# Patient Record
Sex: FEMALE | Race: WHITE | NOT HISPANIC OR LATINO | Employment: FULL TIME | URBAN - METROPOLITAN AREA
[De-identification: names, ages, dates, MRNs, and addresses within clinical notes are randomized per-mention and may not be internally consistent; named-entity substitution may affect disease eponyms.]

---

## 2017-02-01 ENCOUNTER — HOSPITAL ENCOUNTER (OUTPATIENT)
Dept: RADIOLOGY | Facility: MEDICAL CENTER | Age: 42
Discharge: HOME | End: 2017-02-01
Payer: COMMERCIAL

## 2017-02-01 DIAGNOSIS — Z12.31 ENCOUNTER FOR SCREENING MAMMOGRAM FOR MALIGNANT NEOPLASM OF BREAST: ICD-10-CM

## 2017-02-01 PROCEDURE — G0202 SCR MAMMO BI INCL CAD: HCPCS

## 2017-09-13 ENCOUNTER — ALLSCRIPTS OFFICE VISIT (OUTPATIENT)
Dept: OTHER | Facility: OTHER | Age: 42
End: 2017-09-13

## 2017-09-13 LAB
BILIRUB UR QL STRIP: NEGATIVE
CLARITY UR: NORMAL
COLOR UR: YELLOW
GLUCOSE (HISTORICAL): NEGATIVE
HGB UR QL STRIP.AUTO: NEGATIVE
KETONES UR STRIP-MCNC: NEGATIVE MG/DL
LEUKOCYTE ESTERASE UR QL STRIP: NEGATIVE
NITRITE UR QL STRIP: NEGATIVE
PH UR STRIP.AUTO: NEGATIVE [PH]
PROT UR STRIP-MCNC: NEGATIVE MG/DL
SP GR UR STRIP.AUTO: NEGATIVE
UROBILINOGEN UR QL STRIP.AUTO: NEGATIVE

## 2017-09-13 PROCEDURE — G0145 SCR C/V CYTO,THINLAYER,RESCR: HCPCS | Performed by: OBSTETRICS & GYNECOLOGY

## 2017-09-15 ENCOUNTER — LAB REQUISITION (OUTPATIENT)
Dept: LAB | Facility: HOSPITAL | Age: 42
End: 2017-09-15
Payer: COMMERCIAL

## 2017-09-15 DIAGNOSIS — Z12.72 ENCOUNTER FOR SCREENING FOR MALIGNANT NEOPLASM OF VAGINA: ICD-10-CM

## 2017-09-20 LAB
LAB AP GYN PRIMARY INTERPRETATION: NORMAL
LAB AP LMP: NORMAL
Lab: NORMAL

## 2017-10-26 NOTE — PROGRESS NOTES
Assessment  1  Encounter for routine gynecological examination (V72 31) (Z01 419)  2  Pap smear, as part of routine gynecological examination (V76 2) (Z01 419)  3  Visit for screening mammogram (V76 12) (Z12 31)    Plan  Encounter for routine gynecological examination    · Follow-up visit in 1 year Evaluation and Treatment  Follow-up  Status: Hold For -  Scheduling  Requested for: 13Sep2017  Ordered; For: Encounter for routine gynecological examination;  Ordered By: Rosa Rodriguez  Performed:   Due: [id number]  Gynecologic Services Contraceptive Management    · Renew: Sprintec 29 0 25-35 MG-MCG Oral Tablet; Take 1 tablet daily  Rx By: Janene Nguyen; Dispense: 0 Days ; #:84 Tablet; Refill: 3;For: Gynecologic   Services Contraceptive Management; ANGELO = N; Verified Transmission to [employer]; Last Updated By: [employer]; 9/13/2017 10:41:13 AM  Pap smear, as part of routine gynecological examination    · (1) THIN PREP PAP WITH IMAGING; Status: In Progress - Specimen/Data  Collected,Retrospective By Protocol Authorization;   Done: [id number]  Perform:[facility] In Office Collection; Order   Comments:cervical,endocervical; Due:13Oth0159; Last Updated Nhi Bi;   9/13/2017 9:58:32 AM;Ordered; For:Pap smear, as part   of routine gynecological examination; Ordered By:Surinder Ramos;  PAP Maturation index required? : No  LMP: : [id number]  Reflex HPV? : Not Requested  Visit for screening mammogram    · * MAMMO SCREENING BILATERAL W CAD; Status:Hold For - Scheduling,Exact  Date,Retrospective By Protocol Authorization; Requested for:Feb 2018; Perform:[facility]; BNR:[id number]; Last Updated Nhi Bi; 9/13/2017   10:00:30 AM;Ordered; For:Visit for screening mammogram; Ordered By:Jasmeet Ramos Doing; Discussion/Summary  healthy adult female Currently, she eats a healthy diet   cervical cancer screening is current Pap test was done today Breast cancer screening: monthly self breast exam was advised, mammogram is current and mammogram has been ordered  Advice and education were given regarding nutrition, aerobic exercise, calcium supplements, vitamin D supplements and sunscreen use  Normal GYN Exam  Stressed  ordered  SMear DOne    Consult for a vasectomy for her  given to [facility]    Refills for 1 year for the BCP  GYN Exam in 1 year  if any problems develop during the interim      for URO consult given    CHecked UA It was NEGATIVE1    The patient has the current Goals: [address]  The patent has the current Barriers: None  Patient is able to Self-Care  PATIENT EDUCATION RECORD She was given the following educational materials:  Ideas for a Healthy Life Style  The treatment plan was reviewed with the patient/guardian  The patient/guardian understands and agrees with the treatment plan     Self Referrals: No       1 Amended By: Dwayne Le; Sep 13 2017 12:55 PM EST    Chief Complaint  ANNUAL Rory Goff no problem or concerns      History of Present Illness  HPI: Mercedes Romeo is a 38 y/o white female who presents to the office for an annual visits and complains of occasional urinary urgency  She notes this has been going on for a few months and was recently treated for a bladder infection  She states she drinks a large volume of water each day but states the urgency occurs randomly throughout the week  She denies any frequency, burning, dysuria or hematuria  She denies any abnormal vaginal bleeding and states her cycles are regular interval, length and quantity  She is currently taking OCP with no complaints or complications  She denies any mood changes, weight change or HA  Her bowel habits have been normal and she denies any changes  She denies CP, SOB, or thyroid problems  regularly performs SBE and has not noticed any masses, pain or nipple discharge  she feels well and denies any other changes  GYN HM, Adult Female [facility]:  The patient is being seen for a gynecology evaluation  The last health maintenance visit was 1 year(s) ago  General Health: The patient's health since the last visit is described as good  She has regular dental visits  -- She denies vision problems  -- She denies hearing loss  Immunizations status: up to date  Lifestyle:  She consumes a diverse and healthy diet  -- She does not have any weight concerns  -- She exercises regularly  -- She does not use tobacco -- She denies drug use  Reproductive health:  she reports no menstrual problems  -- she uses contraception  -- she is sexually active  Screening: cancer screening reviewed and current  metabolic screening reviewed and current  risk screening reviewed and current  Review of Systems  feelings of urinary urgency, but-- no pelvic pain,-- no vaginal pain,-- no vaginal discharge,-- no vaginal itching,-- no nonmenstrual bleeding,-- no dysmenorrhea,-- periods are regular,-- regular length of periods,-- no dysuria,-- no bladder pain,-- no hematuria,-- no burning sensation during urination,-- no change in urinary frequency,-- no flank pain-- and-- no urinary hesitancy  Constitutional: No fever, no chills, feels well, no tiredness, no recent weight gain or loss  ENT: no ear ache, no loss of hearing, no nosebleeds or nasal discharge, no sore throat or hoarseness  Cardiovascular: no complaints of slow or fast heart rate, no chest pain, no palpitations, no leg claudication or lower extremity edema  Respiratory: no complaints of shortness of breath, no wheezing, no dyspnea on exertion, no orthopnea or PND  Breasts: no complaints of breast pain, breast lump or nipple discharge  Gastrointestinal: no complaints of abdominal pain, no constipation, no nausea or diarrhea, no vomiting, no bloody stools  Genitourinary: as noted in HPI  Musculoskeletal: no complaints of arthralgia, no myalgia, no joint swelling or stiffness, no limb pain or swelling     Integumentary: no complaints of skin rash or lesion, no itching or dry skin, no skin wounds  Neurological: no complaints of headache, no confusion, no numbness or tingling, no dizziness or fainting  Active Problems  1  Chest pain (786 50) (R07 9)  2  Encounter for Pap smear (V76 9) (Z12 9)  3  Encounter for routine gynecological examination (V72 31) (Z01 419)  4  Encounter for routine gynecological examination with Papanicolaou smear of cervix   (V72 31,V76 2) (Z01 419)  5  Gynecologic Services Contraceptive Management (V25 9)  6  Pap smear, as part of routine gynecological examination (V76 2) (Z01 419)  7  Vaginal odor (625 8) (N89 8)  8  Visit for screening mammogram (V76 12) (Z12 31)    Past Medical History   · History of Asthma (493 90) (J45 909)   · History of Diabetes Mellitus (250 00)   · History of hypertension (V12 59) (Z86 79)   · 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)   · History of Reported A Previous Heart Murmur   · History of Reported Pap Smear   · History of Summary Of Previous Pregnancies  2  (Total No )   · History of Summary Of Previous Pregnancies Para 2  (Deliveries)    Surgical History   · History of  Section   · Gynecologic Services Contraceptive Management (V25 9)   · History of High Gastric Bypass    Family History  Father    · Family history of Diabetes Mellitus (V18 0)  Paternal Grandfather    · Family history of Diabetes Mellitus (V18 0)  Family History    · Family history of Breast Cancer (V16 3)   · Family history of Cervical Cancer   · Family history of Osteoporosis (V17 81)   · Family history of Ovarian Cancer (V16 41)   · Family history of Uterine Cancer (V16 49)    Social History   · Being A Social Drinker   · Denied: History of Home Environment Domestic Violence   · Marital History - Currently    · Never A Smoker    Current Meds  1  Clobetasol Propionate 0 05 % External Foam;   Therapy: [id number] to Recorded  2   Enbrel SureClick 50 MG/ML SOLN;   Therapy: [id number] to Recorded  3  Multi-Vitamin TABS; Therapy: (Recorded:12May2014) to Recorded  4  Sprintec 28 0 25-35 MG-MCG Oral Tablet; Take 1 tablet daily; Therapy: 05WBT6168 to (Last Rx:01Aug2017)  Requested for: 01Aug2017 Ordered  5  Vitamin B12 TABS; Therapy: (Recorded:12May2014) to Recorded    Allergies  1  No Known Drug Allergies    Vitals   Recorded: 26OLZ3047 50:90FI   Systolic 604   Diastolic 58   Height 5 ft    Weight 198 lb    BMI Calculated 38 67   BSA Calculated 1 86   LMP [id number]     Physical Exam    Constitutional   General appearance: No acute distress, well appearing and well nourished  Neck   Neck: Normal, supple, trachea midline, no masses  Thyroid: Normal, no thyromegaly  Pulmonary   Respiratory effort: No increased work of breathing or signs of respiratory distress  Auscultation of lungs: Clear to auscultation  Cardiovascular   Auscultation of heart: Normal rate and rhythm, normal S1 and S2, no murmurs  Peripheral vascular exam: Normal pulses Throughout  Genitourinary   External genitalia: Normal and no lesions appreciated  Vagina: Normal, no lesions or dryness appreciated  Urethra: Normal     Urethral meatus: Normal     Bladder: Normal, soft, non-tender and no prolapse or masses appreciated  Cervix: Normal, no palpable masses  Uterus: Normal, non-tender, not enlarged, and no palpable masses  Adnexa/parametria: Normal, non-tender and no fullness or masses appreciated  Anus, perineum, and rectum: Normal sphincter tone, no masses, and no prolapse  Chest   Breasts: Normal and no dimpling or skin changes noted  Abdomen   Abdomen: Normal, non-tender, and no organomegaly noted  Liver and spleen: No hepatomegaly or splenomegaly  Examination for hernias: No hernias appreciated  Stool sample for occult blood: Negative  Lymphatic   Palpation of lymph nodes in neck, axillae, groin and/or other locations: No lymphadenopathy or masses noted      Skin   Skin and subcutaneous tissue: Normal skin turgor and no rashes      Palpation of skin and subcutaneous tissue: Normal     Psychiatric   Orientation to person, place, and time: Normal     Mood and affect: Normal        Provider Comments  Provider Comments:   Children doing well        Signatures   Electronically signed by : DEB Carrington ; Sep 13 2017 12:55PM EST                       (Author)

## 2018-01-14 VITALS
DIASTOLIC BLOOD PRESSURE: 58 MMHG | SYSTOLIC BLOOD PRESSURE: 102 MMHG | BODY MASS INDEX: 38.87 KG/M2 | HEIGHT: 60 IN | WEIGHT: 198 LBS

## 2018-02-01 DIAGNOSIS — Z12.31 ENCOUNTER FOR SCREENING MAMMOGRAM FOR MALIGNANT NEOPLASM OF BREAST: ICD-10-CM

## 2018-08-24 ENCOUNTER — OFFICE VISIT (OUTPATIENT)
Dept: OBGYN CLINIC | Facility: CLINIC | Age: 43
End: 2018-08-24
Payer: COMMERCIAL

## 2018-08-24 VITALS
WEIGHT: 208 LBS | BODY MASS INDEX: 40.84 KG/M2 | HEIGHT: 60 IN | DIASTOLIC BLOOD PRESSURE: 60 MMHG | SYSTOLIC BLOOD PRESSURE: 110 MMHG

## 2018-08-24 DIAGNOSIS — R10.2 PELVIC PAIN IN FEMALE: Primary | ICD-10-CM

## 2018-08-24 DIAGNOSIS — Z01.419 ENCOUNTER FOR GYNECOLOGICAL EXAMINATION: ICD-10-CM

## 2018-08-24 PROCEDURE — 99214 OFFICE O/P EST MOD 30 MIN: CPT | Performed by: OBSTETRICS & GYNECOLOGY

## 2018-08-24 RX ORDER — MULTIVITAMIN
TABLET ORAL
COMMUNITY

## 2018-08-24 RX ORDER — NORGESTIMATE AND ETHINYL ESTRADIOL 0.25-0.035
1 KIT ORAL DAILY
COMMUNITY
Start: 2013-12-16 | End: 2018-09-03 | Stop reason: SDUPTHER

## 2018-08-24 RX ORDER — UBIDECARENONE 75 MG
CAPSULE ORAL
COMMUNITY
Start: 2012-07-27

## 2018-08-24 NOTE — PROGRESS NOTES
Assessment/Plan:    Assessment:  Pelvic pain    Plan:  Pelvic examination performed today was essentially unremarkable  No masses or adnexal masses were appreciated    In light of the findings today, we will order a pelvic ultrasound to assess pelvic structures    Symptoms were possibly consistent with a ruptured ovarian cyst and this possibility will be evaluated with the ultrasound    Patient will be seen in 4-6 weeks for her annual examination as well    All questions were answered for her during today's visit    Patient will call should any problems issues or concerns arise during the interim    Total time spent during today's visit was 25 min of which greater than 50% of the time was face to face time in counseling    No problem-specific Assessment & Plan notes found for this encounter  Diagnoses and all orders for this visit:    Pelvic pain in female    Encounter for gynecological examination    Other orders  -     etanercept (ENBREL) 50 mg/mL SOSY; Inject under the skin  -     cyanocobalamin (VITAMIN B-12) 100 mcg tablet;   -     Multiple Vitamin (MULTI-VITAMIN DAILY) TABS; Take by mouth  -     norgestimate-ethinyl estradiol (SPRINTEC 28) 0 25-35 MG-MCG per tablet; Take 1 tablet by mouth daily        Subjective:      Patient ID: Beena Tay is a 40 y o  female      Patient is a 36-year-old female who presents today complaining of pelvic pain    Pain had started within the past week during intercourse and had localized to her left pelvic region    It persisted after relations were completed and has persisted for several days    It is not as intense as it was initially    She denies any change in bowel or bladder habits    She also denies any significant other pelvic or abdominal pain as well    She reports a normal period in denies any erratic bleeding    She currently has no new medical problems to report either            The following portions of the patient's history were reviewed and updated as appropriate: allergies, current medications, past family history, past medical history, past social history, past surgical history and problem list     Review of Systems   Constitutional: Negative  HENT: Negative  Eyes: Negative  Respiratory: Negative  Cardiovascular: Negative  Gastrointestinal: Negative  Endocrine: Negative  Genitourinary: Negative  Musculoskeletal: Negative  Skin: Negative  Neurological: Negative  Psychiatric/Behavioral: Negative  Objective:      /60 (BP Location: Left arm, Patient Position: Sitting, Cuff Size: Standard)   Ht 5' (1 524 m)   Wt 94 3 kg (208 lb)   LMP 08/10/2018 (Exact Date)   BMI 40 62 kg/m²          Physical Exam   Constitutional: She is oriented to person, place, and time  She appears well-developed and well-nourished  HENT:   Head: Normocephalic  Eyes: Pupils are equal, round, and reactive to light  Neck: Normal range of motion  Pulmonary/Chest: Effort normal    Abdominal: Soft  Genitourinary: Vagina normal and uterus normal  No vaginal discharge found  Genitourinary Comments: Pelvic examination was performed today    Uterus was normal in size anteverted in position and freely mobile    No adnexal pathology was appreciated and was free of any masses or tenderness    No other abnormalities noted       Musculoskeletal: Normal range of motion  Neurological: She is alert and oriented to person, place, and time  Psychiatric: She has a normal mood and affect   Her behavior is normal  Judgment and thought content normal

## 2018-08-24 NOTE — PATIENT INSTRUCTIONS
Topic:  Pelvic pain/left side    Pelvic examination was essentially unremarkable today    Pelvic ultrasound will be ordered to assess pelvic structures    All questions were answered for the patient in detail at today's visit    Patient will be seen in 4-6 weeks for follow-up and her annual examination    Patient will call should any problems issues or concerns arise during the interim

## 2018-08-27 ENCOUNTER — OFFICE VISIT (OUTPATIENT)
Dept: OBGYN CLINIC | Facility: CLINIC | Age: 43
End: 2018-08-27
Payer: COMMERCIAL

## 2018-08-27 ENCOUNTER — ULTRASOUND (OUTPATIENT)
Dept: OBGYN CLINIC | Facility: CLINIC | Age: 43
End: 2018-08-27
Payer: COMMERCIAL

## 2018-08-27 DIAGNOSIS — R10.2 PELVIC PAIN IN FEMALE: Primary | ICD-10-CM

## 2018-08-27 DIAGNOSIS — Z71.9 ENCOUNTER FOR CONSULTATION: ICD-10-CM

## 2018-08-27 PROCEDURE — 76856 US EXAM PELVIC COMPLETE: CPT | Performed by: OBSTETRICS & GYNECOLOGY

## 2018-08-27 PROCEDURE — 99213 OFFICE O/P EST LOW 20 MIN: CPT | Performed by: OBSTETRICS & GYNECOLOGY

## 2018-08-27 NOTE — PROGRESS NOTES
Assessment/Plan:     Assessment:  Pelvic ultrasound for pelvic pain    Plan:  Normal pelvic ultrasound today    Results relayed to the patient    Patient will be seen in several weeks for her annual gynecologic and medical examination         Diagnoses and all orders for this visit:    Pelvic pain in female    Encounter for consultation          Subjective:     Patient ID: Walter Myrick is a 40 y o  female  Patient is a 56-year-old female who presents today for follow-up of pelvic pain which she is been experiencing recently    She denies any erratic bleeding or other problems at this time    This morning she underwent pelvic ultrasound and I carefully reviewed the results    No specific abnormalities were noted on the exam    Uterus was normal in size shape in appearance    Both ovaries were also normal in size shape in appearance as well    No free fluid was seen in the cul de sac    I then reviewed these findings with the patient and reassured her of them    She reported that the pain has since subsided for her    Perhaps she had experienced a ruptured cyst as noted at the last visit and All has resolved    Patient will be coming back to the office in several weeks for an annual exam in    All questions were answered for her today    Patient will call should any problems issues or concerns arise during the interim    Total time spent for the visit was 20 minutes of which greater than 50%  was spent in face-to-face time counseling          Review of Systems   Constitutional: Negative  HENT: Negative  Eyes: Negative  Respiratory: Negative  Cardiovascular: Negative  Gastrointestinal: Negative  Endocrine: Negative  Genitourinary: Negative  Musculoskeletal: Negative  Skin: Negative  Neurological: Negative  Psychiatric/Behavioral: Negative  Objective:     Physical Exam   Constitutional: She is oriented to person, place, and time   She appears well-developed and well-nourished  HENT:   Head: Normocephalic and atraumatic  Eyes: Pupils are equal, round, and reactive to light  Neck: Normal range of motion  Pulmonary/Chest: Effort normal    Musculoskeletal: Normal range of motion  Neurological: She is alert and oriented to person, place, and time  Psychiatric: She has a normal mood and affect   Her behavior is normal  Judgment and thought content normal

## 2018-08-27 NOTE — PROGRESS NOTES
AMB US Pelvic Non OB  Date/Time: 8/27/2018 10:57 AM  Performed by: Jerry Álvarez  Authorized by: Tanisha Vazquez     Procedure details:     Indications: ovarian cysts and non-obstetric abdominal pain      Technique:  US Pelvic, Non-OB with complete exam  Uterine findings:     Diameter (mm):  79    Length (mm):  53    Width (mm):  51    Uterine adhesions: not identified      Adnexal mass: not identified      Polyps: not identified      Myomas: not identified      Endometrial stripe: identified      Endometrial hyperplasia: not identified      Endometrium thickness (mm):  9  Left ovary findings:     Left ovary:  Visualized    Diameter (mm):  25    Length (mm):  28    Width (mm):  19  Right ovary findings:     Right ovary:  Visualized    Diameter (mm):  22    Length (mm):  23    Width (mm):  18  Other findings:     Free pelvic fluid: not identified      Free peritoneal fluid: not identified    Post-Procedure Details:     Impression:  No cyst seen today    WNL    Tolerance:   Tolerated well, no immediate complications

## 2018-08-27 NOTE — PATIENT INSTRUCTIONS
Topic:  Pelvic pain    Pelvic ultrasound was performed today was completely normal   No abnormalities were seen and no free fluid was noted in the cul de sac    These results were relayed to the patient and reassurance was given    She will be seen in several weeks for an annual gynecologic medical examination    Patient will call should any problems issues or concerns arise during the interim

## 2018-09-03 DIAGNOSIS — Z30.41 ORAL CONTRACEPTIVE PILL SURVEILLANCE: Primary | ICD-10-CM

## 2018-09-14 ENCOUNTER — ANNUAL EXAM (OUTPATIENT)
Dept: OBGYN CLINIC | Facility: CLINIC | Age: 43
End: 2018-09-14
Payer: COMMERCIAL

## 2018-09-14 VITALS
DIASTOLIC BLOOD PRESSURE: 66 MMHG | SYSTOLIC BLOOD PRESSURE: 118 MMHG | HEIGHT: 60 IN | BODY MASS INDEX: 40.95 KG/M2 | WEIGHT: 208.6 LBS

## 2018-09-14 DIAGNOSIS — Z01.419 PAP SMEAR, AS PART OF ROUTINE GYNECOLOGICAL EXAMINATION: Primary | ICD-10-CM

## 2018-09-14 DIAGNOSIS — Z12.39 BREAST CANCER SCREENING: ICD-10-CM

## 2018-09-14 DIAGNOSIS — Z01.419 ENCOUNTER FOR ANNUAL ROUTINE GYNECOLOGICAL EXAMINATION: ICD-10-CM

## 2018-09-14 PROCEDURE — G0143 SCR C/V CYTO,THINLAYER,RESCR: HCPCS | Performed by: OBSTETRICS & GYNECOLOGY

## 2018-09-14 PROCEDURE — S0612 ANNUAL GYNECOLOGICAL EXAMINA: HCPCS | Performed by: OBSTETRICS & GYNECOLOGY

## 2018-09-14 RX ORDER — ETANERCEPT 50 MG/ML
SOLUTION SUBCUTANEOUS
COMMUNITY
Start: 2018-08-29

## 2018-09-18 ENCOUNTER — PROCEDURE VISIT (OUTPATIENT)
Dept: OBGYN CLINIC | Facility: CLINIC | Age: 43
End: 2018-09-18
Payer: COMMERCIAL

## 2018-09-18 VITALS
BODY MASS INDEX: 41.23 KG/M2 | HEIGHT: 60 IN | SYSTOLIC BLOOD PRESSURE: 116 MMHG | WEIGHT: 210 LBS | DIASTOLIC BLOOD PRESSURE: 72 MMHG

## 2018-09-18 DIAGNOSIS — L91.8 SKIN TAG: Primary | ICD-10-CM

## 2018-09-18 PROCEDURE — 99213 OFFICE O/P EST LOW 20 MIN: CPT | Performed by: OBSTETRICS & GYNECOLOGY

## 2018-09-18 NOTE — PROGRESS NOTES
Assessment/Plan:     Assessment:  Evaluation of 2 small skin tags on the pubic region on right and left sides    Plan:  After evaluating both areas, it was decided that there was no need at this time to do any intervention  The tag on the right was gone and the base was not irritated  The tag on the left was extremely small and there was no need for intervention at this time  Total time spent for the visit was 15 minutes of which greater than 50% was spent counseling the patient and developing a plan of treatment     Diagnoses and all orders for this visit:    Skin tag  -     Tissue Exam          Subjective:     Patient ID: Marcell Aguayo is a 40 y o  female  Patient is a 42-year-old female who presents today for evaluation of to pubic area skin tags    Patient denies any bleeding or irritation at the sites        Review of Systems   Constitutional: Negative  HENT: Negative  Eyes: Negative  Respiratory: Negative  Cardiovascular: Negative  Gastrointestinal: Negative  Endocrine: Negative  Genitourinary: Negative  Musculoskeletal: Negative  Skin: Negative  Neurological: Negative  Psychiatric/Behavioral: Negative  Objective:     Physical Exam   Constitutional: She is oriented to person, place, and time  She appears well-developed and well-nourished  HENT:   Head: Normocephalic  Eyes: Pupils are equal, round, and reactive to light  Neck: Normal range of motion  Pulmonary/Chest: Effort normal    Genitourinary:   Genitourinary Comments: Examination of the pubic area on both right and left sides was performed  The small area on the right had most likely been a remnant of a small skin tag which had fallen off and the base showed no abnormal changes  The small tag seen on the left side was extremely small and showed no signs of irritation or inflammation  There was no need to intervene and 2 any removal at this time  Musculoskeletal: Normal range of motion  Neurological: She is alert and oriented to person, place, and time  Psychiatric: She has a normal mood and affect   Her behavior is normal  Judgment and thought content normal

## 2018-09-18 NOTE — PATIENT INSTRUCTIONS
Topic:  Skin tags on the pubic area    Right side show no evidence of any tag at this time    Left side showed a tag so small that there was no need to intervene at this time    Patient will return as regularly scheduled next year for her annual gynecologic medical examination    Patient will call should any problems issues concerns or worries developed during the interim

## 2018-09-19 LAB
LAB AP GYN PRIMARY INTERPRETATION: NORMAL
LAB AP LMP: NORMAL
Lab: NORMAL
PATH INTERP SPEC-IMP: NORMAL

## 2019-02-04 ENCOUNTER — HOSPITAL ENCOUNTER (OUTPATIENT)
Dept: RADIOLOGY | Facility: MEDICAL CENTER | Age: 44
Discharge: HOME | End: 2019-02-04
Payer: COMMERCIAL

## 2019-02-04 VITALS — WEIGHT: 210 LBS | BODY MASS INDEX: 41.23 KG/M2 | HEIGHT: 60 IN

## 2019-02-04 DIAGNOSIS — Z12.39 BREAST CANCER SCREENING: ICD-10-CM

## 2019-02-04 PROCEDURE — 77067 SCR MAMMO BI INCL CAD: CPT

## 2019-03-21 NOTE — PROGRESS NOTES
A/o x3, pt ambulatory, now with diarrhea since Monday, states has aggravated anus, daughter at bedside noticed pt jaundice THIS MORNING and brought father to ER. Denies chest pain, dizziness, or difficulty in breathing. Assessment/Plan:    No problem-specific Assessment & Plan notes found for this encounter  Normal gynecological physical examination  Self-breast examination stressed  Mammogram ordered  Discussed regular exercise, healthy diet, importance of vitamin D and calcium supplements  Discussed importance of sun block use during periods of prolonged sun exposure  Patient will be seen in 1 year for routine gynecologic and medical examination  Patient will call office for any problems, concerns, or issues which may arise during the interim  Diagnoses and all orders for this visit:    Pap smear, as part of routine gynecological examination  -     Liquid-based pap, screening    Encounter for annual routine gynecological examination    Breast cancer screening  -     Mammo screening bilateral w cad; Future    Other orders  -     ENBREL SURECLICK 50 MG/ML injection;         Subjective:      Patient ID: Marcell Aguayo is a 40 y o  female  Patient is a 52-year-old female who presents today for her annual gynecologic and medical examination    Patient reports normal periods and no abnormal bleeding    She also denies any significant pelvic or abdominal pain    Patient reports normal bowel and bladder habits    The patient does regular self-breast examinations and is current with screening mammography    Patient has no new medical problems to report at this time              The following portions of the patient's history were reviewed and updated as appropriate: allergies, current medications, past family history, past medical history, past social history, past surgical history and problem list     Review of Systems   Constitutional: Negative  HENT: Negative  Eyes: Negative  Respiratory: Negative  Cardiovascular: Negative  Gastrointestinal: Negative  Endocrine: Negative  Genitourinary: Negative  Musculoskeletal: Negative  Skin: Negative  Neurological: Negative      Psychiatric/Behavioral: Negative  Objective:      /66 (BP Location: Left arm, Patient Position: Sitting, Cuff Size: Standard)   Ht 5' (1 524 m)   Wt 94 6 kg (208 lb 9 6 oz)   LMP 09/06/2018   BMI 40 74 kg/m²          Physical Exam   Constitutional: She appears well-developed  HENT:   Head: Normocephalic  Eyes: Pupils are equal, round, and reactive to light  Neck: Normal range of motion  Neck supple  Cardiovascular: Normal rate and regular rhythm  Pulmonary/Chest: Effort normal and breath sounds normal  Right breast exhibits no inverted nipple, no mass, no nipple discharge, no skin change and no tenderness  Left breast exhibits no inverted nipple, no mass, no nipple discharge, no skin change and no tenderness  Breasts are symmetrical    Abdominal: Soft  Normal appearance and bowel sounds are normal    Genitourinary: Rectum normal, vagina normal and uterus normal  Rectal exam shows guaiac negative stool  Pelvic exam was performed with patient supine  Right adnexum displays no mass, no tenderness and no fullness  Left adnexum displays no mass, no tenderness and no fullness  No vaginal discharge found  Lymphadenopathy:     She has no cervical adenopathy  She has no axillary adenopathy  Right: No inguinal and no supraclavicular adenopathy present  Left: No inguinal and no supraclavicular adenopathy present  Neurological: She is alert  Skin: Skin is intact  No rash noted  Psychiatric: She has a normal mood and affect   Her speech is normal and behavior is normal  Judgment and thought content normal  Cognition and memory are normal

## 2019-08-07 DIAGNOSIS — Z30.41 ORAL CONTRACEPTIVE PILL SURVEILLANCE: ICD-10-CM

## 2020-01-31 ENCOUNTER — ANNUAL EXAM (OUTPATIENT)
Dept: OBGYN CLINIC | Facility: CLINIC | Age: 45
End: 2020-01-31
Payer: COMMERCIAL

## 2020-01-31 VITALS — BODY MASS INDEX: 42.18 KG/M2 | SYSTOLIC BLOOD PRESSURE: 104 MMHG | DIASTOLIC BLOOD PRESSURE: 74 MMHG | WEIGHT: 216 LBS

## 2020-01-31 DIAGNOSIS — Z01.419 ENCOUNTER FOR GYNECOLOGICAL EXAMINATION WITHOUT ABNORMAL FINDING: Primary | ICD-10-CM

## 2020-01-31 DIAGNOSIS — Z30.41 ORAL CONTRACEPTIVE PILL SURVEILLANCE: ICD-10-CM

## 2020-01-31 DIAGNOSIS — Z12.39 SCREENING FOR BREAST CANCER: ICD-10-CM

## 2020-01-31 DIAGNOSIS — Z01.419 PAP SMEAR, AS PART OF ROUTINE GYNECOLOGICAL EXAMINATION: ICD-10-CM

## 2020-01-31 PROCEDURE — G0145 SCR C/V CYTO,THINLAYER,RESCR: HCPCS | Performed by: OBSTETRICS & GYNECOLOGY

## 2020-01-31 PROCEDURE — S0612 ANNUAL GYNECOLOGICAL EXAMINA: HCPCS | Performed by: OBSTETRICS & GYNECOLOGY

## 2020-01-31 RX ORDER — ALBUTEROL SULFATE 90 UG/1
2 AEROSOL, METERED RESPIRATORY (INHALATION) EVERY 6 HOURS PRN
COMMUNITY
Start: 2015-04-08

## 2020-01-31 RX ORDER — HYDROCODONE POLISTIREX AND CHLORPHENIRAMINE POLISTIREX 10; 8 MG/5ML; MG/5ML
5 SUSPENSION, EXTENDED RELEASE ORAL EVERY 12 HOURS PRN
COMMUNITY
Start: 2018-01-26

## 2020-02-04 RX ORDER — NORGESTIMATE AND ETHINYL ESTRADIOL 0.25-0.035
1 KIT ORAL DAILY
Qty: 84 TABLET | Refills: 3 | Status: SHIPPED | OUTPATIENT
Start: 2020-02-04 | End: 2021-01-27 | Stop reason: SDUPTHER

## 2020-02-04 NOTE — PATIENT INSTRUCTIONS
Normal gynecological physical examination  Self-breast examination stressed  Mammogram ordered  Discussed regular exercise, healthy diet, importance of vitamin D and calcium supplements  Discussed importance of sun block use during periods of prolonged sun exposure  Patient will be seen in 1 year for routine gynecologic and medical examination  Patient will call office for any problems, concerns, or issues which may arise during the interim    Appropriate refills for her oral contraceptive were sent in to her pharmacy via the EMR system at today's visit

## 2020-02-04 NOTE — PROGRESS NOTES
Assessment/Plan:    No problem-specific Assessment & Plan notes found for this encounter  Normal gynecological physical examination  Self-breast examination stressed  Mammogram ordered  Discussed regular exercise, healthy diet, importance of vitamin D and calcium supplements  Discussed importance of sun block use during periods of prolonged sun exposure  Patient will be seen in 1 year for routine gynecologic and medical examination  Patient will call office for any problems, concerns, or issues which may arise during the interim  Diagnoses and all orders for this visit:    Encounter for gynecological examination without abnormal finding  -     Liquid-based pap, screening    Pap smear, as part of routine gynecological examination    Screening for breast cancer  -     Mammo screening bilateral w cad; Future    Oral contraceptive pill surveillance  -     norgestimate-ethinyl estradiol (SPRINTEC 28) 0 25-35 MG-MCG per tablet; Take 1 tablet by mouth daily    Other orders  -     hydrocodone-chlorpheniramine polistirex (TUSSIONEX) 10-8 mg/5 mL ER suspension; Take 5 mL by mouth every 12 (twelve) hours as needed  -     albuterol (PROVENTIL HFA,VENTOLIN HFA) 90 mcg/act inhaler; Inhale 2 puffs every 6 (six) hours as needed  -     PEDIATRIC MULTIVITAMINS-IRON PO        Subjective:      Patient ID: Bharat Estrada is a 38 y o  female  Patient is a 71-year-old female who presents today for her annual gynecologic medical examination    Patient reports that her cycles are regular, short and light on the oral contraceptive  She denies any significant pelvic or abdominal pain  She also denies any erratic spotting or bleeding between cycles      Patient reports normal bowel and bladder habits    She also denies any significant pelvic or abdominal pain    She is not being followed for any significant medical problems at this time    Patient denies any headaches, chest pain, shortness of breath, fever, shakes or chills    Patient does regular self-breast examinations and is up-to-date with screening mammography  Appropriate arrangements for her annual mammogram replaced into the EMR system at today's visit  The following portions of the patient's history were reviewed and updated as appropriate: allergies, current medications, past family history, past medical history, past social history, past surgical history and problem list     Review of Systems   Constitutional: Negative  Negative for appetite change, diaphoresis, fatigue, fever and unexpected weight change  HENT: Negative  Eyes: Negative  Respiratory: Negative  Cardiovascular: Negative  Gastrointestinal: Negative  Negative for abdominal pain, blood in stool, constipation, diarrhea, nausea and vomiting  Endocrine: Negative  Negative for cold intolerance and heat intolerance  Genitourinary: Negative  Negative for dysuria, frequency, hematuria, urgency, vaginal bleeding, vaginal discharge and vaginal pain  Musculoskeletal: Negative  Skin: Negative  Allergic/Immunologic: Negative  Neurological: Negative  Hematological: Negative  Negative for adenopathy  Psychiatric/Behavioral: Negative  Objective:      /74   Wt 98 kg (216 lb)   LMP 01/16/2020   BMI 42 18 kg/m²          Physical Exam   Constitutional: She appears well-developed  HENT:   Head: Normocephalic  Eyes: Pupils are equal, round, and reactive to light  Neck: Normal range of motion  Neck supple  Cardiovascular: Normal rate and regular rhythm  Pulmonary/Chest: Effort normal and breath sounds normal  Right breast exhibits no inverted nipple, no mass, no nipple discharge, no skin change and no tenderness  Left breast exhibits no inverted nipple, no mass, no nipple discharge, no skin change and no tenderness  Breasts are symmetrical    Abdominal: Soft   Normal appearance and bowel sounds are normal    Genitourinary: Rectum normal and vagina normal  Pelvic exam was performed with patient supine  There is no rash or lesion on the right labia  There is no rash or lesion on the left labia  Uterus is not enlarged and not tender  Cervix exhibits no discharge and no friability  Right adnexum displays no mass, no tenderness and no fullness  Left adnexum displays no mass, no tenderness and no fullness  No erythema, tenderness or bleeding in the vagina  No vaginal discharge found  Lymphadenopathy:     She has no cervical adenopathy  She has no axillary adenopathy  Right: No inguinal and no supraclavicular adenopathy present  Left: No inguinal and no supraclavicular adenopathy present  Neurological: She is alert  Skin: Skin is intact  No rash noted  Psychiatric: She has a normal mood and affect   Her speech is normal and behavior is normal  Judgment and thought content normal  Cognition and memory are normal

## 2020-02-06 LAB
LAB AP GYN PRIMARY INTERPRETATION: NORMAL
Lab: NORMAL

## 2020-11-30 ENCOUNTER — OFFICE VISIT (OUTPATIENT)
Dept: OBGYN CLINIC | Facility: CLINIC | Age: 45
End: 2020-11-30
Payer: COMMERCIAL

## 2020-11-30 VITALS
SYSTOLIC BLOOD PRESSURE: 118 MMHG | WEIGHT: 224 LBS | BODY MASS INDEX: 41.22 KG/M2 | DIASTOLIC BLOOD PRESSURE: 64 MMHG | HEIGHT: 62 IN

## 2020-11-30 DIAGNOSIS — N94.9 VAGINAL BURNING: ICD-10-CM

## 2020-11-30 DIAGNOSIS — R30.0 DYSURIA: ICD-10-CM

## 2020-11-30 DIAGNOSIS — N76.0 ACUTE VAGINITIS: Primary | ICD-10-CM

## 2020-11-30 PROCEDURE — 87086 URINE CULTURE/COLONY COUNT: CPT | Performed by: OBSTETRICS & GYNECOLOGY

## 2020-11-30 PROCEDURE — 99214 OFFICE O/P EST MOD 30 MIN: CPT | Performed by: OBSTETRICS & GYNECOLOGY

## 2020-11-30 PROCEDURE — 87070 CULTURE OTHR SPECIMN AEROBIC: CPT | Performed by: OBSTETRICS & GYNECOLOGY

## 2020-12-02 LAB — BACTERIA UR CULT: NORMAL

## 2020-12-04 ENCOUNTER — TELEPHONE (OUTPATIENT)
Dept: OBGYN CLINIC | Facility: CLINIC | Age: 45
End: 2020-12-04

## 2020-12-04 LAB — BACTERIA GENITAL AEROBE CULT: NORMAL

## 2020-12-14 ENCOUNTER — ULTRASOUND (OUTPATIENT)
Dept: OBGYN CLINIC | Facility: CLINIC | Age: 45
End: 2020-12-14
Payer: COMMERCIAL

## 2020-12-14 DIAGNOSIS — N89.8 VAGINAL DISCHARGE: Primary | ICD-10-CM

## 2020-12-14 PROCEDURE — 76856 US EXAM PELVIC COMPLETE: CPT | Performed by: OBSTETRICS & GYNECOLOGY

## 2021-01-27 ENCOUNTER — ANNUAL EXAM (OUTPATIENT)
Dept: OBGYN CLINIC | Facility: CLINIC | Age: 46
End: 2021-01-27
Payer: COMMERCIAL

## 2021-01-27 VITALS
WEIGHT: 214 LBS | BODY MASS INDEX: 42.01 KG/M2 | SYSTOLIC BLOOD PRESSURE: 114 MMHG | HEIGHT: 60 IN | DIASTOLIC BLOOD PRESSURE: 76 MMHG

## 2021-01-27 DIAGNOSIS — Z30.09 ENCOUNTER FOR VASECTOMY COUNSELING: ICD-10-CM

## 2021-01-27 DIAGNOSIS — Z01.419 PAP SMEAR, AS PART OF ROUTINE GYNECOLOGICAL EXAMINATION: ICD-10-CM

## 2021-01-27 DIAGNOSIS — Z12.31 ENCOUNTER FOR SCREENING MAMMOGRAM FOR MALIGNANT NEOPLASM OF BREAST: ICD-10-CM

## 2021-01-27 DIAGNOSIS — Z30.41 ORAL CONTRACEPTIVE PILL SURVEILLANCE: ICD-10-CM

## 2021-01-27 DIAGNOSIS — Z01.419 ENCNTR FOR GYN EXAM (GENERAL) (ROUTINE) W/O ABN FINDINGS: Primary | ICD-10-CM

## 2021-01-27 PROCEDURE — G0143 SCR C/V CYTO,THINLAYER,RESCR: HCPCS | Performed by: OBSTETRICS & GYNECOLOGY

## 2021-01-27 PROCEDURE — 87624 HPV HI-RISK TYP POOLED RSLT: CPT | Performed by: OBSTETRICS & GYNECOLOGY

## 2021-01-27 PROCEDURE — S0612 ANNUAL GYNECOLOGICAL EXAMINA: HCPCS | Performed by: OBSTETRICS & GYNECOLOGY

## 2021-01-27 RX ORDER — NORGESTIMATE AND ETHINYL ESTRADIOL 0.25-0.035
1 KIT ORAL DAILY
Qty: 84 TABLET | Refills: 3 | Status: SHIPPED | OUTPATIENT
Start: 2021-01-27 | End: 2022-01-12

## 2021-01-27 NOTE — PROGRESS NOTES
Assessment/Plan:    No problem-specific Assessment & Plan notes found for this encounter  Diagnoses and all orders for this visit:    Encntr for gyn exam (general) (routine) w/o abn findings    Pap smear, as part of routine gynecological examination    Encounter for screening mammogram for malignant neoplasm of breast  -     Mammo screening bilateral w 3d & cad; Future    Other orders  -     Liquid-based pap, screening          Normal gynecological physical examination  Self-breast examination stressed  Mammogram ordered  Discussed regular exercise, healthy diet, importance of vitamin D and calcium supplements  Discussed importance of sun block use during periods of prolonged sun exposure  Patient will be seen in 1 year for routine gynecologic and medical examination  Patient will call office for any problems, concerns, or issues which may arise during the interim  Subjective:      Patient ID: Bennie Dandy is a 41 y o  female  Patient is a 81-year-old female who presents today for her annual gynecologic and medical examination    Patient reports that her menstrual cycles are normal, on time and without any abnormalities  They last for several days and are normal in flow and without any excessive bleeding or clotting  She also denies any severe pain or cramping as well  She denies any erratic bleeding or spotting between cycles  Patient reports normal appetite    She also reports normal bowel and bladder habits    She denies any specific abdominal or pelvic pain    Patient denies any headaches, chest pain, shortness of breath fever shakes or chills    Patient is a nonsmoker    Patient denies any COVID-19 symptoms including cough or loss of taste or smell    She is being followed and treated for psoriatic arthritis    Patient does regular self-breast examinations and is up-to-date with screening mammography    Appropriate arrangements for her annual screening mammogram replaced into the EMR system at today's visit  The following portions of the patient's history were reviewed and updated as appropriate: allergies, current medications, past family history, past medical history, past social history, past surgical history and problem list     Review of Systems   Constitutional: Negative  Negative for appetite change, diaphoresis, fatigue, fever and unexpected weight change  HENT: Negative  Eyes: Negative  Respiratory: Negative  Cardiovascular: Negative  Gastrointestinal: Negative  Negative for abdominal pain, blood in stool, constipation, diarrhea, nausea and vomiting  Endocrine: Negative  Negative for cold intolerance and heat intolerance  Genitourinary: Negative  Negative for dysuria, frequency, hematuria, urgency, vaginal bleeding, vaginal discharge and vaginal pain  Musculoskeletal: Negative  Followed for psoriatic arthritis   Skin: Negative  Allergic/Immunologic: Negative  Neurological: Negative  Hematological: Negative  Negative for adenopathy  Psychiatric/Behavioral: Negative  Objective:      /76 (BP Location: Left arm, Patient Position: Sitting, Cuff Size: Large)   Ht 5' (1 524 m)   Wt 97 1 kg (214 lb)   LMP 01/15/2021   BMI 41 79 kg/m²          Physical Exam  Constitutional:       General: She is not in acute distress  Appearance: Normal appearance  She is well-developed  She is not diaphoretic  HENT:      Head: Normocephalic and atraumatic  Eyes:      Pupils: Pupils are equal, round, and reactive to light  Neck:      Musculoskeletal: Normal range of motion and neck supple  Cardiovascular:      Rate and Rhythm: Normal rate and regular rhythm  Heart sounds: Normal heart sounds  No murmur  No friction rub  No gallop  Pulmonary:      Effort: Pulmonary effort is normal       Breath sounds: Normal breath sounds     Chest:      Breasts: Breasts are symmetrical          Right: No inverted nipple, mass, nipple discharge, skin change or tenderness  Left: No inverted nipple, mass, nipple discharge, skin change or tenderness  Abdominal:      General: Bowel sounds are normal       Palpations: Abdomen is soft  Comments: Psoriasis is noted at the umbilicus   Genitourinary:     General: Normal vulva  Exam position: Supine  Labia:         Right: No rash or lesion  Left: No rash or lesion  Vagina: Normal  No vaginal discharge, erythema, tenderness or bleeding  Cervix: No discharge or friability  Uterus: Not enlarged and not tender  Adnexa:         Right: No mass, tenderness or fullness  Left: No mass, tenderness or fullness  Rectum: Normal  Guaiac result negative  Comments: Pelvic examination revealed no abnormalities  Good pelvic support confirmed  Musculoskeletal: Normal range of motion  Lymphadenopathy:      Cervical: No cervical adenopathy  Upper Body:      Right upper body: No supraclavicular adenopathy  Left upper body: No supraclavicular adenopathy  Skin:     General: Skin is warm and dry  Findings: No rash  Neurological:      Mental Status: She is alert and oriented to person, place, and time  Psychiatric:         Speech: Speech normal          Behavior: Behavior normal          Thought Content:  Thought content normal          Judgment: Judgment normal

## 2021-01-28 LAB
HPV HR 12 DNA CVX QL NAA+PROBE: NEGATIVE
HPV16 DNA CVX QL NAA+PROBE: NEGATIVE
HPV18 DNA CVX QL NAA+PROBE: NEGATIVE

## 2021-02-01 LAB
LAB AP GYN PRIMARY INTERPRETATION: NORMAL
LAB AP LMP: NORMAL
Lab: NORMAL

## 2022-01-12 DIAGNOSIS — Z30.41 ORAL CONTRACEPTIVE PILL SURVEILLANCE: ICD-10-CM

## 2022-01-12 RX ORDER — NORGESTIMATE AND ETHINYL ESTRADIOL 0.25-0.035
KIT ORAL
Qty: 84 TABLET | Refills: 3 | Status: SHIPPED | OUTPATIENT
Start: 2022-01-12 | End: 2022-07-26

## 2022-03-07 ENCOUNTER — ANNUAL EXAM (OUTPATIENT)
Dept: OBGYN CLINIC | Facility: CLINIC | Age: 47
End: 2022-03-07
Payer: COMMERCIAL

## 2022-03-07 VITALS
DIASTOLIC BLOOD PRESSURE: 76 MMHG | WEIGHT: 230 LBS | HEIGHT: 60 IN | SYSTOLIC BLOOD PRESSURE: 118 MMHG | BODY MASS INDEX: 45.16 KG/M2

## 2022-03-07 DIAGNOSIS — Z01.419 ENCNTR FOR GYN EXAM (GENERAL) (ROUTINE) W/O ABN FINDINGS: Primary | ICD-10-CM

## 2022-03-07 DIAGNOSIS — Z12.31 ENCOUNTER FOR SCREENING MAMMOGRAM FOR MALIGNANT NEOPLASM OF BREAST: ICD-10-CM

## 2022-03-07 DIAGNOSIS — Z01.419 PAP SMEAR, AS PART OF ROUTINE GYNECOLOGICAL EXAMINATION: ICD-10-CM

## 2022-03-07 PROCEDURE — G0124 SCREEN C/V THIN LAYER BY MD: HCPCS | Performed by: PATHOLOGY

## 2022-03-07 PROCEDURE — S0612 ANNUAL GYNECOLOGICAL EXAMINA: HCPCS | Performed by: OBSTETRICS & GYNECOLOGY

## 2022-03-07 PROCEDURE — G0145 SCR C/V CYTO,THINLAYER,RESCR: HCPCS | Performed by: PATHOLOGY

## 2022-03-07 NOTE — PROGRESS NOTES
Assessment/Plan:    No problem-specific Assessment & Plan notes found for this encounter  Diagnoses and all orders for this visit:    Encntr for gyn exam (general) (routine) w/o abn findings  -     Liquid-based pap, screening    Pap smear, as part of routine gynecological examination    Encounter for screening mammogram for malignant neoplasm of breast  -     Mammo screening bilateral w 3d & cad; Future          Normal gynecological physical examination  Self-breast examination stressed  Mammogram ordered  Discussed regular exercise, healthy diet, importance of vitamin D and calcium supplements  Discussed importance of sun block use during periods of prolonged sun exposure  Patient will be seen in 1 year for routine gynecologic and medical examination  Patient will call office for any problems, concerns, or issues which may arise during the interim  Subjective:        HPI  52-year-old female  presents for outpatient annual gynecologic exam   She is currently on an combined oral contraceptive Daisy  Reports her periods come every 28 days  Last menstrual period arrived on   She reports bleeding for only 24 hours which is shorter than usual for her  Denies heavy bleeding or heavy cramping with periods  Denies vaginal bleeding between periods  Patient is currently not a smoker and denies tobacco use  She has a past medical history significant for psoriatic arthritis and is medicated on Enbrel  She follows rheumatology  Reports some right ankle pain today which has been ongoing for a while and shares she is going to follow-up with Rheumatology  Patient reports doing self-breast exams monthly  Denies any breast abnormalities or concerns  Denies nipple itching, discharge, or inversion  Offers no cardiopulmonary complaints or genitourinary complaints  Denies blood in the stool  Denies vaginal itching, discharge, or pain   Patient is currently not having perimenopausal symptoms  Denies hot flashes, mood swings, or trouble sleeping  She currently has 2 kids ages 8 and 15  They are both in sports and keep her busy  Shares she works for herself in [employer] and reports doing 70 hours work week  Reports drinking alcohol on occasions or holidays but denies drinking daily  Denies daily exercise due to her busy schedule  Patient ID: Suellen Lee is a 40 y o  female who presents today for her annual gynecologic and medical examination    Menstrual status: Menstruating  LMP 2/26/2022    Vasomotor symptoms: Denies hot flashes or facial flushing  Patient reports normal appetite    Patient reports normal bowel and bladder habits    Patient denies any significant pelvic or abdominal pain    Patient denies any headaches, chest pain, shortness of breath fever shakes or chills    Patient denies any COVID 19 symptoms including cough or loss of taste or smell    Medical problems: Psoriatic arthritis  Colonoscopy status: Follow guidelines recommended     Mammogram status: Last mammogram in 2019 d    The following portions of the patient's history were reviewed and updated as appropriate: allergies, current medications, past family history, past medical history, past social history, past surgical history and problem list     Review of Systems   Constitutional: Negative  Negative for appetite change, diaphoresis, fatigue, fever and unexpected weight change  HENT: Negative  Eyes: Negative  Respiratory: Negative  Cardiovascular: Negative  Gastrointestinal: Negative  Negative for abdominal pain, blood in stool, constipation, diarrhea, nausea and vomiting  Endocrine: Negative  Negative for cold intolerance and heat intolerance  Genitourinary: Negative  Negative for dysuria, frequency, hematuria, urgency, vaginal bleeding, vaginal discharge and vaginal pain  Musculoskeletal: Negative  Skin: Negative  Allergic/Immunologic: Negative  Neurological: Negative  Hematological: Negative  Negative for adenopathy  Psychiatric/Behavioral: Negative  Objective:      /76 (BP Location: Left arm, Patient Position: Sitting, Cuff Size: Large)   Ht 5' (1 524 m)   Wt 104 kg (230 lb)   LMP 02/26/2022 (Exact Date)   BMI 44 92 kg/m²          Physical Exam  Constitutional:       General: She is not in acute distress  Appearance: Normal appearance  She is well-developed  She is not diaphoretic  HENT:      Head: Normocephalic and atraumatic  Eyes:      Pupils: Pupils are equal, round, and reactive to light  Cardiovascular:      Rate and Rhythm: Normal rate and regular rhythm  Heart sounds: Normal heart sounds  No murmur heard  No friction rub  No gallop  Pulmonary:      Effort: Pulmonary effort is normal       Breath sounds: Normal breath sounds  Chest:   Breasts: Breasts are symmetrical       Right: No inverted nipple, mass, nipple discharge, skin change, tenderness or supraclavicular adenopathy  Left: No inverted nipple, mass, nipple discharge, skin change, tenderness or supraclavicular adenopathy  Abdominal:      General: Bowel sounds are normal       Palpations: Abdomen is soft  Genitourinary:     General: Normal vulva  Exam position: Supine  Labia:         Right: No rash or lesion  Left: No rash or lesion  Vagina: Normal  No vaginal discharge, erythema, tenderness or bleeding  Cervix: No discharge or friability  Uterus: Not enlarged and not tender  Adnexa:         Right: No mass, tenderness or fullness  Left: No mass, tenderness or fullness  Rectum: Normal  Guaiac result negative  Musculoskeletal:         General: Normal range of motion  Cervical back: Normal range of motion and neck supple  Lymphadenopathy:      Cervical: No cervical adenopathy  Upper Body:      Right upper body: No supraclavicular adenopathy        Left upper body: No supraclavicular adenopathy  Skin:     General: Skin is warm and dry  Findings: No rash  Neurological:      General: No focal deficit present  Mental Status: She is alert and oriented to person, place, and time  Psychiatric:         Mood and Affect: Mood normal          Speech: Speech normal          Behavior: Behavior normal          Thought Content:  Thought content normal          Judgment: Judgment normal

## 2022-03-14 LAB
LAB AP GYN PRIMARY INTERPRETATION: ABNORMAL
Lab: ABNORMAL
PATH INTERP SPEC-IMP: ABNORMAL

## 2022-03-24 ENCOUNTER — TELEPHONE (OUTPATIENT)
Dept: OBGYN CLINIC | Facility: CLINIC | Age: 47
End: 2022-03-24

## 2022-03-24 NOTE — TELEPHONE ENCOUNTER
----- Message from Pola Pride MD sent at 3/15/2022 12:20 PM EDT -----  Pap smear shows low-grade changes  Patient needs colposcopy for further evaluation  Pap also show signs of a yeast infection  Please treat with Diflucan  Thanks

## 2022-03-31 ENCOUNTER — PROCEDURE VISIT (OUTPATIENT)
Dept: OBGYN CLINIC | Facility: CLINIC | Age: 47
End: 2022-03-31
Payer: COMMERCIAL

## 2022-03-31 VITALS — BODY MASS INDEX: 45.31 KG/M2 | DIASTOLIC BLOOD PRESSURE: 70 MMHG | WEIGHT: 232 LBS | SYSTOLIC BLOOD PRESSURE: 116 MMHG

## 2022-03-31 DIAGNOSIS — R87.612 LGSIL OF CERVIX OF UNDETERMINED SIGNIFICANCE: Primary | ICD-10-CM

## 2022-03-31 PROCEDURE — 88305 TISSUE EXAM BY PATHOLOGIST: CPT | Performed by: PATHOLOGY

## 2022-03-31 PROCEDURE — 57454 BX/CURETT OF CERVIX W/SCOPE: CPT | Performed by: OBSTETRICS & GYNECOLOGY

## 2022-03-31 NOTE — PROGRESS NOTES
Colposcopy    Date/Time: 3/31/2022 12:14 PM  Performed by: Wei Boss MD  Authorized by: Wei Boss MD     Consent:     Consent obtained:  Verbal    Consent given by:  Patient    Procedural risks discussed:  Bleeding    Patient questions answered: yes      Patient agrees, verbalizes understanding, and wants to proceed: yes      Educational handouts given: no      Instructions and paperwork completed: yes    Universal protocol:     Patient states understanding of procedure being performed: yes      Relevant documents present and verified: yes      Test results available and properly labeled: yes      Imaging studies available: yes      Required blood products, implants, devices, and special equipment available: yes      Site marked: no    Pre-procedure:     Pre-procedure timeout performed: yes      Premeds:  Acetaminophen    Prepped with: acetic acid    Indication:     Indication:  LSIL  Procedure:     Procedure: Colposcopy w/ cervical biopsy and ECC      Under satisfactory analgesia the patient was prepped and draped in the dorsal lithotomy position: yes      Kinnear speculum was placed in the vagina: yes      Under colposcopic examination the transition zone was seen in entirety: yes      Intracervical block was performed: no      Endocervix was curetted using a Kevorkian curette: yes      Cervical biopsy performed with a cervical biopsy punch: yes      Tampon inserted: no      Monsel's solution was applied: yes      Biopsy(s): yes      Location:  2 o'clock  Post-procedure:     Findings: White epithelium      Impression: Low grade cervical dysplasia      Impression comment:  Possible chronic cervicitis versus HPV change    Patient tolerance of procedure:   Tolerated well, no immediate complications  Comments:      Topic:  LGSIL of Pap smear     Colposcopy performed and cervical biopsy with endocervical curettage completed     Complete hemostasis achieved with Monsel's solution     All questions answered for the patient     Instructions and restrictions given     Further treatment and follow-up planning will be based upon final pathology results    Patient to call for any problems, questions, issues or concerns which may arise for her

## 2022-03-31 NOTE — PATIENT INSTRUCTIONS
Topic:  LGSIL of cervical Pap smear     Possibly performed and cervical biopsy with endocervical curettage completed     Excellent hemostasis with Monsel's confirmed    All questions answered     Instructions and restrictions given     Further treatment and follow-up planning will be based upon final pathology results     Patient to call for any problems, questions, issues or concerns which may arise for her

## 2022-04-18 ENCOUNTER — TELEPHONE (OUTPATIENT)
Dept: OBGYN CLINIC | Facility: CLINIC | Age: 47
End: 2022-04-18

## 2022-04-18 NOTE — TELEPHONE ENCOUNTER
----- Message from Soo Green MD sent at 4/13/2022  3:03 PM EDT -----  Biopsies were all benignWill see patient back in 6 months for Pap smear Thanks

## 2022-04-21 DIAGNOSIS — Z98.84 STATUS POST BARIATRIC SURGERY: Primary | ICD-10-CM

## 2022-04-29 ENCOUNTER — HOSPITAL ENCOUNTER (OUTPATIENT)
Dept: RADIOLOGY | Facility: HOSPITAL | Age: 47
Discharge: HOME | End: 2022-04-29
Attending: SURGERY
Payer: COMMERCIAL

## 2022-04-29 DIAGNOSIS — Z98.84 STATUS POST BARIATRIC SURGERY: ICD-10-CM

## 2022-04-29 PROCEDURE — 74240 X-RAY XM UPR GI TRC 1CNTRST: CPT

## 2022-05-06 ENCOUNTER — NEW PATIENT (OUTPATIENT)
Dept: URBAN - METROPOLITAN AREA CLINIC 6 | Facility: CLINIC | Age: 47
End: 2022-05-06

## 2022-05-06 DIAGNOSIS — L71.9: ICD-10-CM

## 2022-05-06 DIAGNOSIS — H35.341: ICD-10-CM

## 2022-05-06 PROCEDURE — 92134 CPTRZ OPH DX IMG PST SGM RTA: CPT

## 2022-05-06 PROCEDURE — 99204 OFFICE O/P NEW MOD 45 MIN: CPT

## 2022-05-06 ASSESSMENT — VISUAL ACUITY
OS_CC: 20/25-1
OD_CC: 20/60-1

## 2022-05-06 ASSESSMENT — TONOMETRY
OD_IOP_MMHG: 12
OS_IOP_MMHG: 13

## 2022-05-24 PROBLEM — Z48.815 ENCOUNTER FOR SURGICAL AFTERCARE FOLLOWING SURGERY OF DIGESTIVE SYSTEM: Status: ACTIVE | Noted: 2022-05-24

## 2022-05-24 PROBLEM — K91.2 POSTSURGICAL MALABSORPTION: Status: ACTIVE | Noted: 2022-05-24

## 2022-05-24 PROBLEM — L40.50 PSORIATIC ARTHRITIS (HCC): Status: ACTIVE | Noted: 2022-05-24

## 2022-05-24 NOTE — ASSESSMENT & PLAN NOTE
-At risk for malabsorption of vitamins/minerals secondary to malabsorption and restriction of intake from bariatric surgery  -NOT Currently taking adequate postop bariatric surgery vitamin supplementation: Flintstones MVI, 5,000mcg sublingual B12  -Advised her to start Wili Rico MVI w/ 45mg iron and calcium citrate 1500mg daily - to review with RD  -CBC/Metabolic panel ordered  -Patient received education about the importance of adhering to a lifelong supplementation regimen to avoid vitamin/mineral deficiencies Caller: Sherine Reed    Relationship: Emergency Contact    Best call back number: [phone]    What is the best time to reach you: ANY TIME    Who are you requesting to speak with (clinical staff, provider,  specific staff member): DR COKER    Do you know the name of the person who called: SHERINE    What was the call regarding: PATIENT WAS EXPOSED TO COVID ON 07/26/2021 AND HAS BEEN COUGHING UP THICK PHLEGM AND HAS DIARRHEA AND WOULD LIKE TO KNOW IF SHE NEEDS TO BE SEEN OR IF SOMETHING COULD BE CALLED IN FOR HER? PLEASE ADVISE. PATIENT NEVER HAS BEEN TESTED TO CONFIRM IF SHE HAS COVID.    Do you require a callback: YES

## 2022-05-24 NOTE — ASSESSMENT & PLAN NOTE
-s/p lap Nicol-En-Y Gastric Bypass in [place] in 2005  She is struggling with weight regain - up about 40lbs from her moncho  She will f/u with surgical RD and LCSW and consult with MWM when available  Initial: 337lbs  Current: 232 4lbs  Moncho: 190lbs  Current BMI is Body mass index is 45 39 kg/m²      · Tolerating a regular diet-yes  · Eating at least 60 grams of protein per day-no; advised her to increase - especially in the morning - gave sample of protein shake  · Following 30/60 minute rule with liquids-no; advised on importance  · Drinking at least 64 ounces of fluid per day-no; advised her to increase  · Drinking carbonated beverages-no  · Sufficient exercise-no; encouraged her to restart  · Using NSAIDs regularly-no  · Using nicotine-no  · Using alcohol-occasional  Advised about the risks of alcohol s/p bariatric surgery and recommend avoiding all alcohol

## 2022-05-26 ENCOUNTER — CONSULT (OUTPATIENT)
Dept: BARIATRICS | Facility: CLINIC | Age: 47
End: 2022-05-26
Payer: COMMERCIAL

## 2022-05-26 VITALS
WEIGHT: 232.4 LBS | TEMPERATURE: 99.4 F | RESPIRATION RATE: 14 BRPM | HEIGHT: 60 IN | BODY MASS INDEX: 45.62 KG/M2 | SYSTOLIC BLOOD PRESSURE: 118 MMHG | DIASTOLIC BLOOD PRESSURE: 80 MMHG | HEART RATE: 63 BPM

## 2022-05-26 DIAGNOSIS — Z12.11 COLON CANCER SCREENING: ICD-10-CM

## 2022-05-26 DIAGNOSIS — L40.50 PSORIATIC ARTHRITIS (HCC): ICD-10-CM

## 2022-05-26 DIAGNOSIS — E66.01 OBESITY, CLASS III, BMI 40-49.9 (MORBID OBESITY) (HCC): ICD-10-CM

## 2022-05-26 DIAGNOSIS — Z48.815 ENCOUNTER FOR SURGICAL AFTERCARE FOLLOWING SURGERY OF DIGESTIVE SYSTEM: Primary | ICD-10-CM

## 2022-05-26 DIAGNOSIS — K91.2 POSTSURGICAL MALABSORPTION: ICD-10-CM

## 2022-05-26 PROCEDURE — 99204 OFFICE O/P NEW MOD 45 MIN: CPT | Performed by: PHYSICIAN ASSISTANT

## 2022-05-26 NOTE — PROGRESS NOTES
Assessment/Plan:    Encounter for surgical aftercare following surgery of digestive system  -s/p lap Nicol-En-Y Gastric Bypass in [address] in 2005  She is struggling with weight regain - up about 40lbs from her moncho  She will f/u with surgical RD and LCSW and consult with MWM when available  Initial: 337lbs  Current: 232 4lbs  Moncho: 190lbs  Current BMI is Body mass index is 45 39 kg/m²  · Tolerating a regular diet-yes  · Eating at least 60 grams of protein per day-no; advised her to increase - especially in the morning - gave sample of protein shake  · Following 30/60 minute rule with liquids-no; advised on importance  · Drinking at least 64 ounces of fluid per day-no; advised her to increase  · Drinking carbonated beverages-no  · Sufficient exercise-no; encouraged her to restart  · Using NSAIDs regularly-no  · Using nicotine-no  · Using alcohol-occasional  Advised about the risks of alcohol s/p bariatric surgery and recommend avoiding all alcohol      Postsurgical malabsorption  -At risk for malabsorption of vitamins/minerals secondary to malabsorption and restriction of intake from bariatric surgery  -NOT Currently taking adequate postop bariatric surgery vitamin supplementation: Flintstones MVI, 5,000mcg sublingual B12  -Advised her to start Wili Rico MVI w/ 45mg iron and calcium citrate 1500mg daily - to review with RD  -CBC/Metabolic panel ordered  -Patient received education about the importance of adhering to a lifelong supplementation regimen to avoid vitamin/mineral deficiencies       Psoriatic arthritis ([address] )  -On enbrel  -Followed by Rheumatology        Diagnoses and all orders for this visit:    Encounter for surgical aftercare following surgery of digestive system    Postsurgical malabsorption  -     CBC and differential; Future  -     Comprehensive metabolic panel; Future  -     Folate; Future  -     Iron Panel (Includes Ferritin, Iron Sat%, Iron, and TIBC);  Future  -     Hemoglobin A1C; Future  - Zinc; Future  -     Vitamin D 25 hydroxy; Future  -     Vitamin B12; Future  -     Vitamin B1, whole blood; Future  -     Vitamin A; Future  -     PTH, intact; Future  -     Lipid panel; Future    Psoriatic arthritis (HCC)    Obesity, Class III, BMI 40-49 9 (morbid obesity) (HCC)  -     Hemoglobin A1C; Future  -     Lipid panel; Future    Colon cancer screening  -     Ambulatory referral to Gastroenterology; Future          Subjective:      Patient ID: Cindy Isabel is a 40 y o  female  -s/p lap Nicol-En-Y Gastric Bypass in [place] in 2005  Presents to the office today to establish care and to seek help for weight regain  Tolerating a regular diet; denies N/V, dysphagia  Daily BM  Recently started experiencing some heartburn - take TUMS occasionally  She has struggled with obesity her entire life  She lost about 150lbs s/p surgery and then subsequently had 2 children (2008 and 2011) and only gained about 32lbs with each pregnancy and lost the weight after  She has tried keto, exercise, HCG diets, and is always able to lose weight but then regains after stopping  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      UGI 04/29/22: "Small sliding hiatal hernia with multiple episodes of moderate spontaneous gastroesophageal reflux "     Diet Recall:   B - black coffee 16-32oz, sometimes eat rice cake and PB (measures portions)  L - cucumbers and tomato salad w/ feta cheese and pistachios or green salad with chicken or feta  D - chicken or lean beef and veggies  Snacks - Grazes during afternoon granola bar or nuts or olives or kale chips    Fluids - 16-32oz black coffee, 60oz water, rare ETOH    The following portions of the patient's history were reviewed and updated as appropriate: allergies, current medications, past family history, past medical history, past social history, past surgical history and problem list     Review of Systems      Objective:      /80 (BP Location: Left arm, Patient Position: Sitting, Cuff Size: Large)   Pulse 63   Temp 99 4 °F (37 4 °C) (Tympanic)   Resp 14   Ht 5' (1 524 m)   Wt 105 kg (232 lb 6 4 oz)   BMI 45 39 kg/m²     Colonoscopy-referral placed to GI       Physical Exam  Vitals reviewed  Constitutional:       General: She is not in acute distress  Appearance: She is well-developed  HENT:      Head: Normocephalic and atraumatic  Eyes:      General: No scleral icterus  Cardiovascular:      Rate and Rhythm: Normal rate and regular rhythm  Pulmonary:      Effort: Pulmonary effort is normal  No respiratory distress  Abdominal:      General: There is no distension  Palpations: Abdomen is soft  Tenderness: There is no abdominal tenderness  Comments: No incisional hernias appreciated   Skin:     General: Skin is warm and dry  Neurological:      Mental Status: She is alert and oriented to person, place, and time  Psychiatric:         Mood and Affect: Mood normal          Behavior: Behavior normal            BARRIERS: none identified    GOALS:   · Continued/Maintain healthy weight loss with good nutrition intakes  · Adequate hydration with at least 64oz  fluid intake  · Normal vitamin and mineral levels  · Exercise as tolerated  · Follow up with dietitian and  and consult with Medical Weight Management    · Follow-up in 1 year  We kindly ask that your arrive 15 minutes before your scheduled appointment time with your provider to allow our staff to room you, get your vital signs and update your chart  · Follow diet as discussed  · Get lab work done in the next 2 weeks  You have been given a lab slip today  Please call the office if you need a replacement    It is recommended to check with your insurance BEFORE getting labs done to make sure they are covered by your policy  Also, please check with your PCP and other providers before getting labs to avoid duplicate labs  Make sure to HOLD any multivitamins that may contain biotin and any biotin supplements FOR 5 DAYS before any labs since it can affect the results  · Follow vitamin and mineral recommendations as reviewed with you  · Call our office if you have any problems with abdominal pain especially associated with fever, chills, nausea, vomiting or any other concerns  · All  Post-bariatric surgery patients should be aware that very small quantities of any alcohol can cause impairment and it is very possible not to feel the effect  The effect can be in the system for several hours  It is also a stomach irritant  · It is advised to AVOID alcohol, Nonsteroidal antiinflammatory drugs (NSAIDS) and nicotine of all forms   Any of these can cause stomach irritation/pain

## 2022-05-26 NOTE — PATIENT INSTRUCTIONS
GOALS:   Continued/Maintain healthy weight loss with good nutrition intakes  Adequate hydration with at least 64oz  fluid intake  Normal vitamin and mineral levels  Exercise as tolerated  Follow up with dietitian and  and consult with Medical Weight Management  Avoid drinking with your meals or for 60 minutes after eating    Follow-up in 1 year  We kindly ask that your arrive 15 minutes before your scheduled appointment time with your provider to allow our staff to room you, get your vital signs and update your chart  Follow diet as discussed  Get lab work done in the next 2 weeks  You have been given a lab slip today  Please call the office if you need a replacement  It is recommended to check with your insurance BEFORE getting labs done to make sure they are covered by your policy  Also, please check with your PCP and other providers before getting labs to avoid duplicate labs  Make sure to HOLD any multivitamins that may contain biotin and any biotin supplements FOR 5 DAYS before any labs since it can affect the results  Follow vitamin and mineral recommendations as reviewed with you  Call our office if you have any problems with abdominal pain especially associated with fever, chills, nausea, vomiting or any other concerns  All  Post-bariatric surgery patients should be aware that very small quantities of any alcohol can cause impairment and it is very possible not to feel the effect  The effect can be in the system for several hours  It is also a stomach irritant  It is advised to AVOID alcohol, Nonsteroidal antiinflammatory drugs (NSAIDS) and nicotine of all forms   Any of these can cause stomach irritation/pain
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
## 2022-06-10 ENCOUNTER — OFFICE VISIT (OUTPATIENT)
Dept: BARIATRICS | Facility: CLINIC | Age: 47
End: 2022-06-10

## 2022-06-10 VITALS — WEIGHT: 241.4 LBS | BODY MASS INDEX: 47.15 KG/M2

## 2022-06-10 DIAGNOSIS — Z98.84 BARIATRIC SURGERY STATUS: Primary | ICD-10-CM

## 2022-06-10 DIAGNOSIS — F41.9 ANXIETY: ICD-10-CM

## 2022-06-10 PROCEDURE — RECHECK

## 2022-06-23 ENCOUNTER — OFFICE VISIT (OUTPATIENT)
Dept: BARIATRICS | Facility: CLINIC | Age: 47
End: 2022-06-23

## 2022-06-23 VITALS — WEIGHT: 242 LBS | HEIGHT: 60 IN | BODY MASS INDEX: 47.51 KG/M2

## 2022-06-23 DIAGNOSIS — K91.2 POSTSURGICAL MALABSORPTION: Primary | ICD-10-CM

## 2022-06-23 PROCEDURE — RECHECK

## 2022-06-30 ENCOUNTER — APPOINTMENT (OUTPATIENT)
Dept: LAB | Facility: MEDICAL CENTER | Age: 47
End: 2022-06-30
Payer: COMMERCIAL

## 2022-06-30 ENCOUNTER — OFFICE VISIT (OUTPATIENT)
Dept: FAMILY MEDICINE CLINIC | Facility: CLINIC | Age: 47
End: 2022-06-30
Payer: COMMERCIAL

## 2022-06-30 VITALS
HEART RATE: 57 BPM | DIASTOLIC BLOOD PRESSURE: 72 MMHG | OXYGEN SATURATION: 98 % | SYSTOLIC BLOOD PRESSURE: 120 MMHG | HEIGHT: 60 IN | TEMPERATURE: 98.2 F | BODY MASS INDEX: 46.96 KG/M2 | WEIGHT: 239.2 LBS

## 2022-06-30 DIAGNOSIS — Z12.11 ENCOUNTER FOR COLORECTAL CANCER SCREENING: ICD-10-CM

## 2022-06-30 DIAGNOSIS — Z00.00 ANNUAL PHYSICAL EXAM: Primary | ICD-10-CM

## 2022-06-30 DIAGNOSIS — K91.2 POSTSURGICAL MALABSORPTION: ICD-10-CM

## 2022-06-30 DIAGNOSIS — Z12.12 ENCOUNTER FOR COLORECTAL CANCER SCREENING: ICD-10-CM

## 2022-06-30 DIAGNOSIS — E66.01 OBESITY, CLASS III, BMI 40-49.9 (MORBID OBESITY) (HCC): ICD-10-CM

## 2022-06-30 LAB
25(OH)D3 SERPL-MCNC: 27.6 NG/ML (ref 30–100)
ALBUMIN SERPL BCP-MCNC: 3.4 G/DL (ref 3.5–5)
ALP SERPL-CCNC: 29 U/L (ref 46–116)
ALT SERPL W P-5'-P-CCNC: 22 U/L (ref 12–78)
ANION GAP SERPL CALCULATED.3IONS-SCNC: 8 MMOL/L (ref 4–13)
AST SERPL W P-5'-P-CCNC: 17 U/L (ref 5–45)
BASOPHILS # BLD AUTO: 0.06 THOUSANDS/ΜL (ref 0–0.1)
BASOPHILS NFR BLD AUTO: 1 % (ref 0–1)
BILIRUB SERPL-MCNC: 0.56 MG/DL (ref 0.2–1)
BUN SERPL-MCNC: 13 MG/DL (ref 5–25)
CALCIUM ALBUM COR SERPL-MCNC: 9.7 MG/DL (ref 8.3–10.1)
CALCIUM SERPL-MCNC: 9.2 MG/DL (ref 8.3–10.1)
CHLORIDE SERPL-SCNC: 106 MMOL/L (ref 100–108)
CHOLEST SERPL-MCNC: 161 MG/DL
CO2 SERPL-SCNC: 25 MMOL/L (ref 21–32)
CREAT SERPL-MCNC: 0.78 MG/DL (ref 0.6–1.3)
EOSINOPHIL # BLD AUTO: 0.15 THOUSAND/ΜL (ref 0–0.61)
EOSINOPHIL NFR BLD AUTO: 3 % (ref 0–6)
ERYTHROCYTE [DISTWIDTH] IN BLOOD BY AUTOMATED COUNT: 12.3 % (ref 11.6–15.1)
EST. AVERAGE GLUCOSE BLD GHB EST-MCNC: 100 MG/DL
FERRITIN SERPL-MCNC: 7 NG/ML (ref 8–388)
FOLATE SERPL-MCNC: 18 NG/ML (ref 3.1–17.5)
GFR SERPL CREATININE-BSD FRML MDRD: 92 ML/MIN/1.73SQ M
GLUCOSE P FAST SERPL-MCNC: 82 MG/DL (ref 65–99)
HBA1C MFR BLD: 5.1 %
HCT VFR BLD AUTO: 41.4 % (ref 34.8–46.1)
HDLC SERPL-MCNC: 75 MG/DL
HGB BLD-MCNC: 13.2 G/DL (ref 11.5–15.4)
IMM GRANULOCYTES # BLD AUTO: 0.01 THOUSAND/UL (ref 0–0.2)
IMM GRANULOCYTES NFR BLD AUTO: 0 % (ref 0–2)
IRON SATN MFR SERPL: 13 % (ref 15–50)
IRON SERPL-MCNC: 127 UG/DL (ref 50–170)
LDLC SERPL CALC-MCNC: 68 MG/DL (ref 0–100)
LYMPHOCYTES # BLD AUTO: 1.55 THOUSANDS/ΜL (ref 0.6–4.47)
LYMPHOCYTES NFR BLD AUTO: 26 % (ref 14–44)
MCH RBC QN AUTO: 30.4 PG (ref 26.8–34.3)
MCHC RBC AUTO-ENTMCNC: 31.9 G/DL (ref 31.4–37.4)
MCV RBC AUTO: 95 FL (ref 82–98)
MONOCYTES # BLD AUTO: 0.37 THOUSAND/ΜL (ref 0.17–1.22)
MONOCYTES NFR BLD AUTO: 6 % (ref 4–12)
NEUTROPHILS # BLD AUTO: 3.73 THOUSANDS/ΜL (ref 1.85–7.62)
NEUTS SEG NFR BLD AUTO: 64 % (ref 43–75)
NONHDLC SERPL-MCNC: 86 MG/DL
NRBC BLD AUTO-RTO: 0 /100 WBCS
PLATELET # BLD AUTO: 256 THOUSANDS/UL (ref 149–390)
PMV BLD AUTO: 12.8 FL (ref 8.9–12.7)
POTASSIUM SERPL-SCNC: 4.2 MMOL/L (ref 3.5–5.3)
PROT SERPL-MCNC: 7 G/DL (ref 6.4–8.2)
PTH-INTACT SERPL-MCNC: 54.6 PG/ML (ref 18.4–80.1)
RBC # BLD AUTO: 4.34 MILLION/UL (ref 3.81–5.12)
SODIUM SERPL-SCNC: 139 MMOL/L (ref 136–145)
TIBC SERPL-MCNC: 947 UG/DL (ref 250–450)
TRIGL SERPL-MCNC: 89 MG/DL
VIT B12 SERPL-MCNC: 1227 PG/ML (ref 100–900)
WBC # BLD AUTO: 5.87 THOUSAND/UL (ref 4.31–10.16)

## 2022-06-30 PROCEDURE — 84590 ASSAY OF VITAMIN A: CPT

## 2022-06-30 PROCEDURE — 99173 VISUAL ACUITY SCREEN: CPT | Performed by: FAMILY MEDICINE

## 2022-06-30 PROCEDURE — 82728 ASSAY OF FERRITIN: CPT

## 2022-06-30 PROCEDURE — 3008F BODY MASS INDEX DOCD: CPT | Performed by: FAMILY MEDICINE

## 2022-06-30 PROCEDURE — 83036 HEMOGLOBIN GLYCOSYLATED A1C: CPT

## 2022-06-30 PROCEDURE — 83540 ASSAY OF IRON: CPT

## 2022-06-30 PROCEDURE — 82607 VITAMIN B-12: CPT

## 2022-06-30 PROCEDURE — 36415 COLL VENOUS BLD VENIPUNCTURE: CPT

## 2022-06-30 PROCEDURE — 84425 ASSAY OF VITAMIN B-1: CPT

## 2022-06-30 PROCEDURE — 99386 PREV VISIT NEW AGE 40-64: CPT | Performed by: FAMILY MEDICINE

## 2022-06-30 PROCEDURE — 80053 COMPREHEN METABOLIC PANEL: CPT

## 2022-06-30 PROCEDURE — 1036F TOBACCO NON-USER: CPT | Performed by: FAMILY MEDICINE

## 2022-06-30 PROCEDURE — 85025 COMPLETE CBC W/AUTO DIFF WBC: CPT

## 2022-06-30 PROCEDURE — 92551 PURE TONE HEARING TEST AIR: CPT | Performed by: FAMILY MEDICINE

## 2022-06-30 PROCEDURE — 3725F SCREEN DEPRESSION PERFORMED: CPT | Performed by: FAMILY MEDICINE

## 2022-06-30 PROCEDURE — 83550 IRON BINDING TEST: CPT

## 2022-06-30 PROCEDURE — 80061 LIPID PANEL: CPT

## 2022-06-30 PROCEDURE — 83970 ASSAY OF PARATHORMONE: CPT

## 2022-06-30 PROCEDURE — 82306 VITAMIN D 25 HYDROXY: CPT

## 2022-06-30 PROCEDURE — 84630 ASSAY OF ZINC: CPT

## 2022-06-30 PROCEDURE — 82746 ASSAY OF FOLIC ACID SERUM: CPT

## 2022-06-30 NOTE — PROGRESS NOTES
[facility]    NAME: Bennie Dandy  AGE: 40 y o  SEX: female  : 1975     DATE: 2022     Assessment and Plan:     Problem List Items Addressed This Visit        Other    Annual physical exam - Primary     Annual physical completed at this time  Pt establishing care  Blood work up to date             Other Visit Diagnoses     Encounter for colorectal cancer screening        Relevant Orders    Cologuard          Immunizations and preventive care screenings were discussed with patient today  Appropriate education was printed on patient's after visit summary  Counseling:  Alcohol/drug use: discussed moderation in alcohol intake, the recommendations for healthy alcohol use, and avoidance of illicit drug use  Dental Health: discussed importance of regular tooth brushing, flossing, and dental visits  Injury prevention: discussed safety/seat belts, safety helmets, smoke detectors, carbon dioxide detectors, and smoking near bedding or upholstery  Sexual health: discussed sexually transmitted diseases, partner selection, use of condoms, avoidance of unintended pregnancy, and contraceptive alternatives  · Exercise: the importance of regular exercise/physical activity was discussed  Recommend exercise 3-5 times per week for at least 30 minutes  Return in about 8 weeks (around 2022) for preop eval eye surgery  Chief Complaint:     Chief Complaint   Patient presents with    Physical Exam      History of Present Illness:     Adult Annual Physical   Patient here for a comprehensive physical exam  The patient reports Patient is overall doing well, has specialists which she follows with rheumatology for psoriatic arthritis, Dermatology for psoriasis as well as bariatric surgery  Patient has history of bariatric surgery back in         Diet and Physical Activity  · Diet/Nutrition: see weight management and dietician  · Exercise: more difficulty since she owns her own business  Depression Screening  PHQ-2/9 Depression Screening    Little interest or pleasure in doing things: 0 - not at all  Feeling down, depressed, or hopeless: 0 - not at all  PHQ-2 Score: 0  PHQ-2 Interpretation: Negative depression screen       General Health  · Sleep: sleeps well  · Hearing: normal - bilateral   · Vision: right eye vision decresaed, upcoming surgery  · Dental: regular dental visits and brushes teeth twice daily  Has dental implants       /GYN Health  · Patient is: perimenopausal, changing  · Last menstrual period: 6/14/22, regular (had one missed period), very light  · Contraceptive method: oral contraceptives  Review of Systems:     Review of Systems   Constitutional: Negative for appetite change, chills, fatigue, fever and unexpected weight change  HENT: Negative for congestion, rhinorrhea, sore throat, tinnitus and trouble swallowing  Eyes: Positive for visual disturbance  Respiratory: Negative for shortness of breath  Cardiovascular: Negative for chest pain and palpitations  Gastrointestinal: Negative for abdominal pain, blood in stool, constipation, diarrhea, nausea and vomiting  Genitourinary: Negative for dysuria, frequency, hematuria and urgency  Musculoskeletal: Positive for arthralgias (baseline arthritis)  Neurological: Negative for dizziness, light-headedness and headaches            Past Medical History:     Past Medical History:   Diagnosis Date    Arthritis     Asthma     GERD (gastroesophageal reflux disease)     Obesity       Past Surgical History:     Past Surgical History:   Procedure Laterality Date    GASTRIC BYPASS        Social History:     Social History     Socioeconomic History    Marital status: /Civil Union     Spouse name: None    Number of children: None    Years of education: None    Highest education level: None   Occupational History    None Tobacco Use    Smoking status: Never Smoker    Smokeless tobacco: Never Used   Vaping Use    Vaping Use: Never used   Substance and Sexual Activity    Alcohol use: Yes     Comment: socially    Drug use: No    Sexual activity: Yes   Other Topics Concern    None   Social History Narrative    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[employer])    Works from home    Feels safe at home and her job, same place    Has good access to transportation, food, medicine    Patient has carbon monoxide and smoke detector in the house    No firearms in the house    Always wears a seatbelt    No concern for sparse abuse     Social Determinants of Health     Financial Resource Strain: Not on file   Food Insecurity: Not on file   Transportation Needs: Not on file   Physical Activity: Not on file   Stress: Not on file   Social Connections: Not on file   Intimate Partner Violence: Not on file   Housing Stability: Not on file      Family History:     Family History   Problem Relation Age of Onset    Breast cancer Mother 58    Heart disease Father     Diabetes Maternal Grandfather       Current Medications:     Current Outpatient Medications   Medication Sig Dispense Refill    albuterol (PROVENTIL HFA,VENTOLIN HFA) 90 mcg/act inhaler Inhale 2 puffs every 6 (six) hours as needed      cyanocobalamin (VITAMIN B-12) 100 mcg tablet       etanercept (ENBREL) 50 mg/mL SOSY Inject under the skin      Daisy 0 25-35 MG-MCG per tablet TAKE 1 TABLET BY MOUTH EVERY DAY 84 tablet 3    PEDIATRIC MULTIVITAMINS-IRON PO       ENBREL SURECLICK 50 MG/ML injection  (Patient not taking: No sig reported)      hydrocodone-chlorpheniramine polistirex (TUSSIONEX) 10-8 mg/5 mL ER suspension Take 5 mL by mouth every 12 (twelve) hours as needed      Multiple Vitamin (MULTI-VITAMIN DAILY) TABS Take by mouth (Patient not taking: No sig reported)       No current facility-administered medications for this visit  Allergies:     No Known Allergies   Physical Exam:     /72 (BP Location: Right arm, Patient Position: Sitting, Cuff Size: Large)   Pulse 57   Temp 98 2 °F (36 8 °C)   Ht 5' (1 524 m)   Wt 109 kg (239 lb 3 2 oz)   SpO2 98%   BMI 46 72 kg/m²     Physical Exam  Vitals reviewed  Constitutional:       General: She is not in acute distress  Appearance: Normal appearance  She is obese  She is not ill-appearing, toxic-appearing or diaphoretic  Cardiovascular:      Rate and Rhythm: Normal rate  Pulses: Normal pulses  Heart sounds: Normal heart sounds  Pulmonary:      Effort: Pulmonary effort is normal       Breath sounds: Normal breath sounds  Abdominal:      General: Abdomen is flat  Musculoskeletal:         General: No swelling, tenderness, deformity or signs of injury  Normal range of motion  Skin:     General: Skin is warm and dry  Capillary Refill: Capillary refill takes less than 2 seconds  Coloration: Skin is not jaundiced  Neurological:      General: No focal deficit present  Mental Status: She is alert and oriented to person, place, and time     Psychiatric:         Mood and Affect: Mood normal           Collin Mata MD  [facility]

## 2022-06-30 NOTE — PATIENT INSTRUCTIONS

## 2022-07-02 LAB — ZINC SERPL-MCNC: 62 UG/DL (ref 44–115)

## 2022-07-05 ENCOUNTER — TELEPHONE (OUTPATIENT)
Dept: BARIATRICS | Facility: CLINIC | Age: 47
End: 2022-07-05

## 2022-07-05 ENCOUNTER — TELEPHONE (OUTPATIENT)
Dept: HEMATOLOGY ONCOLOGY | Facility: CLINIC | Age: 47
End: 2022-07-05

## 2022-07-05 DIAGNOSIS — K91.2 POSTSURGICAL MALABSORPTION: ICD-10-CM

## 2022-07-05 DIAGNOSIS — E55.9 VITAMIN D INSUFFICIENCY: ICD-10-CM

## 2022-07-05 DIAGNOSIS — R79.0 LOW FERRITIN: Primary | ICD-10-CM

## 2022-07-05 LAB — VIT B1 BLD-SCNC: 105.2 NMOL/L (ref 66.5–200)

## 2022-07-05 NOTE — TELEPHONE ENCOUNTER
Called to schedule consult, no answer  Left VM with [facility] number instructing patient to call back and schedule

## 2022-07-06 LAB — VIT A SERPL-MCNC: 32.1 UG/DL (ref 20.1–62)

## 2022-07-26 DIAGNOSIS — Z30.41 ORAL CONTRACEPTIVE PILL SURVEILLANCE: ICD-10-CM

## 2022-07-26 RX ORDER — NORGESTIMATE AND ETHINYL ESTRADIOL 0.25-0.035
KIT ORAL
Qty: 84 TABLET | Refills: 3 | Status: SHIPPED | OUTPATIENT
Start: 2022-07-26

## 2022-08-03 NOTE — PROGRESS NOTES
Hematology/Oncology Outpatient Consult Note  Mary Michele 40 y o  female MRN: @ Encounter: [id number]        Date:  8/4/2022        CC:  Low Serum ferritin      HPI:  Mary Michele is a 40year old female with a history of psoriatic arthritis on Enbrel, Nicol-En-Y Gastric Bypass in 2005 who is referred to hematology by her Bariatric team for evaluation of a low serum ferritin  She is being seen for initial consultation 8/4/2022     Blood work on file dating back to 2015 reviewed  No CBC abnormalities noted  No h/o anemia  Recent CBC and differential  from 6/30/2022 was also normal  Hgb 13 2, MCV 95  Her iron panel does indicate some iron deficiency, mild Vit D deficiency  Her serum iron is normal 127  Iron sat 13%, Serum Ferritin is low 7  Folate normal  B 12 elevated 1227  Vit D 27 6    Patient denies any significant symptoms from her low serum ferritin  She has some fatigue but 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, 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  Busy with children and their activities  Denies any abnormal bleeding  No menorrhagia  Denies any CP, SOB, lightheadedness or dizziness  She is following with ophthalmologist and will having surgery on her right eye next month for issues with her retina  Test Results:    Imaging: No results found      Labs:   Lab Results   Component Value Date    WBC 5 87 06/30/2022    HGB 13 2 06/30/2022    HCT 41 4 06/30/2022    MCV 95 06/30/2022     06/30/2022     Lab Results   Component Value Date     02/26/2015    K 4 2 06/30/2022     06/30/2022    CO2 25 06/30/2022    ANIONGAP 6 02/26/2015    BUN 13 06/30/2022    CREATININE 0 78 06/30/2022    GLUCOSE 101 02/26/2015    GLUF 82 06/30/2022    CALCIUM 9 2 06/30/2022    CORRECTEDCA 9 7 06/30/2022    AST 17 06/30/2022    ALT 22 06/30/2022    ALKPHOS 29 (L) 06/30/2022    PROT 7 5 02/26/2015    BILITOT 0 4 02/26/2015    EGFR 92 06/30/2022             ROS:  Review of Systems   Constitutional: Positive for fatigue  Eyes: Positive for visual disturbance  All other systems reviewed and are negative  Active Problems:   Patient Active Problem List   Diagnosis    Encounter for surgical aftercare following surgery of digestive system    Postsurgical malabsorption    Psoriatic arthritis ([facility] [address] )    Annual physical exam       Past Medical History:   Past Medical History:   Diagnosis Date    Arthritis     Asthma     GERD (gastroesophageal reflux disease)     Obesity        Surgical History:   Past Surgical History:   Procedure Laterality Date    GASTRIC BYPASS         Family History:    Family History   Problem Relation Age of Onset    Breast cancer Mother 58    Heart disease Father     Diabetes Maternal Grandfather        Cancer-related family history includes Breast cancer (age of onset: 58) in her mother      Social History:   Social History     Socioeconomic History    Marital status: /Civil Union     Spouse name: Not on file    Number of children: Not on file    Years of education: Not on file    Highest education level: Not on file   Occupational History    Not on file   Tobacco Use    Smoking status: Never Smoker    Smokeless tobacco: Never Used   Vaping Use    Vaping Use: Never used   Substance and Sexual Activity    Alcohol use: Yes     Comment: socially    Drug use: No    Sexual activity: Yes   Other Topics Concern    Not on file   Social History Narrative    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companies)    Works from home    Feels safe at home and her job, same place    Has good access to transportation, food, medicine    Patient has carbon monoxide and smoke detector in the house    No firearms in the house    Always wears a seatbelt    No concern for sparse abuse     Social Determinants of Health     Financial Resource Strain: Not on file   Food Insecurity: Not on file   Transportation Needs: Not on file   Physical Activity: Not on file Stress: Not on file   Social Connections: Not on file   Intimate Partner Violence: Not on file   Housing Stability: Not on file       Current Medications:   Current Outpatient Medications   Medication Sig Dispense Refill    albuterol (PROVENTIL HFA,VENTOLIN HFA) 90 mcg/act inhaler Inhale 2 puffs every 6 (six) hours as needed      calcium citrate (CALCITRATE) 950 (200 Ca) MG tablet Take 1 tablet by mouth daily      cyanocobalamin (VITAMIN B-12) 100 mcg tablet       etanercept (ENBREL) 50 mg/mL SOSY Inject under the skin      ferrous sulfate 325 (65 FE) MG EC tablet Take 325 mg by mouth 3 (three) times a day with meals      Daisy 0 25-35 MG-MCG per tablet TAKE 1 TABLET BY MOUTH EVERY DAY 84 tablet 3    ENBREL SURECLICK 50 MG/ML injection  (Patient not taking: No sig reported)      hydrocodone-chlorpheniramine polistirex (TUSSIONEX) 10-8 mg/5 mL ER suspension Take 5 mL by mouth every 12 (twelve) hours as needed      Multiple Vitamin (MULTI-VITAMIN DAILY) TABS Take by mouth (Patient not taking: No sig reported)      PEDIATRIC MULTIVITAMINS-IRON PO  (Patient not taking: Reported on 8/4/2022)       No current facility-administered medications for this visit  Allergies: No Known Allergies      Physical Exam:    Body surface area is 2 03 meters squared  Wt Readings from Last 3 Encounters:   08/04/22 111 kg (244 lb)   06/30/22 109 kg (239 lb 3 2 oz)   06/23/22 110 kg (242 lb)        Temp Readings from Last 3 Encounters:   08/04/22 97 8 °F (36 6 °C) (Temporal)   06/30/22 98 2 °F (36 8 °C)   05/26/22 99 4 °F (37 4 °C) (Tympanic)        BP Readings from Last 3 Encounters:   08/04/22 136/76   06/30/22 120/72   05/26/22 118/80         Pulse Readings from Last 3 Encounters:   08/04/22 84   06/30/22 57   05/26/22 63          Physical Exam  Constitutional:       General: She is not in acute distress  Appearance: Normal appearance  She is obese  HENT:      Head: Normocephalic and atraumatic     Eyes: General: No scleral icterus  Right eye: No discharge  Left eye: No discharge  Conjunctiva/sclera: Conjunctivae normal    Cardiovascular:      Rate and Rhythm: Normal rate and regular rhythm  Pulmonary:      Effort: Pulmonary effort is normal  No respiratory distress  Breath sounds: Normal breath sounds  Chest:   Breasts:      Right: No axillary adenopathy or supraclavicular adenopathy  Left: No axillary adenopathy or supraclavicular adenopathy  Abdominal:      General: Bowel sounds are normal  There is no distension  Palpations: Abdomen is soft  There is no mass  Tenderness: There is no abdominal tenderness  Musculoskeletal:         General: Normal range of motion  Lymphadenopathy:      Cervical: No cervical adenopathy  Upper Body:      Right upper body: No supraclavicular, axillary or pectoral adenopathy  Left upper body: No supraclavicular, axillary or pectoral adenopathy  Skin:     General: Skin is warm and dry  Neurological:      General: No focal deficit present  Mental Status: She is alert and oriented to person, place, and time  Psychiatric:         Mood and Affect: Mood normal          Behavior: Behavior normal           Assessment/ Plan:    1  Postsurgical malabsorption    2  Low ferritin      The patient is a 40year old female with a history of psoriatic arthritis on Enbrel, Nicol-En-Y Gastric Bypass in 2005 who is referred to hematology by her Bariatric team for evaluation of a low serum ferritin  Her CBC is normal  No evidence of anemia  Her serum ferritin is low 7  Her serum iron is normal   She has some fatigue but denies any other symptoms of iron deficiency  We discussed today, that her ferritin is low likely as result of her history of gastric bypass  This surgery can result in malabsorption even years later   We discussed therapeutic trial of IV Venfoer to help prevent her counts from dropping any lower and resulting in anemia  Potential side effects of IV iron could include but may not be limited to:  change in taste, diarrhea, muscle cramps, nausea or vomiting, pain in the arms or legs, pain or burning sensation in the injection site, allergic reaction  The patient verbalized understanding and wishes to proceed  I will set her up for 6 weekly doses of IV Venofer 200 mg  I will repeat blood work in 4 months to see how she responded to treatment  She will return for a follow-up visit in 4 months with repeat blood work  She is instructed to call at any time with questions or concerns  Barriers: None  Patient  able to self care  Portions of the record may have been created with voice recognition software  Occasional wrong word or "sound a like" substitutions may have occurred due to the inherent limitations of voice recognition software  Read the chart carefully and recognize, using context, where substitutions have occurred

## 2022-08-04 ENCOUNTER — TELEPHONE (OUTPATIENT)
Dept: HEMATOLOGY ONCOLOGY | Facility: CLINIC | Age: 47
End: 2022-08-04

## 2022-08-04 ENCOUNTER — CONSULT (OUTPATIENT)
Dept: HEMATOLOGY ONCOLOGY | Facility: CLINIC | Age: 47
End: 2022-08-04
Payer: COMMERCIAL

## 2022-08-04 VITALS
HEIGHT: 60 IN | RESPIRATION RATE: 14 BRPM | BODY MASS INDEX: 47.91 KG/M2 | DIASTOLIC BLOOD PRESSURE: 76 MMHG | HEART RATE: 84 BPM | OXYGEN SATURATION: 98 % | SYSTOLIC BLOOD PRESSURE: 136 MMHG | TEMPERATURE: 97.8 F | WEIGHT: 244 LBS

## 2022-08-04 DIAGNOSIS — K91.2 POSTSURGICAL MALABSORPTION: Primary | ICD-10-CM

## 2022-08-04 DIAGNOSIS — R79.0 LOW FERRITIN: ICD-10-CM

## 2022-08-04 PROCEDURE — 99204 OFFICE O/P NEW MOD 45 MIN: CPT | Performed by: NURSE PRACTITIONER

## 2022-08-04 RX ORDER — LANOLIN ALCOHOL/MO/W.PET/CERES
325 CREAM (GRAM) TOPICAL
COMMUNITY

## 2022-08-04 RX ORDER — SODIUM CHLORIDE 9 MG/ML
20 INJECTION, SOLUTION INTRAVENOUS ONCE
Status: CANCELLED | OUTPATIENT
Start: 2022-08-12

## 2022-08-04 RX ORDER — IBUPROFEN 200 MG
1 CAPSULE ORAL DAILY
COMMUNITY

## 2022-08-04 RX ORDER — SODIUM CHLORIDE 9 MG/ML
20 INJECTION, SOLUTION INTRAVENOUS ONCE
Status: CANCELLED | OUTPATIENT
Start: 2022-08-08

## 2022-08-04 NOTE — TELEPHONE ENCOUNTER
Unfortunately, our soonest appt here at [facility] until 09/09  I called [facility] which is the pt's second choice facility and they were able to schedule her for 08/12 at 8am  They said when she comes in for her 08/12 appt, they will get the rest of her appts scheduled

## 2022-08-04 NOTE — TELEPHONE ENCOUNTER
While we try to accommodate patient requests, our priority is to schedule treatment according to Doctor's orders and site availability  1  Does the Provider use the intake sheet or checkout note? YES- INTAKE SHEET  2  What would be a preferred day of the week that would work best for your infusion appointment? FRIDAYS  3  Do you prefer mornings or afternoons for your appointments? MORNINGS  4  Are there any days or dates that do not work for your schedule, including any upcoming vacations? 9/21  5  We are going to try our best to schedule you at the infusion center closest to your home  In the event that we are unable to what would be your next preferred infusion site or sites? PATIENT PREFERS TO BE AT [facility]    1  [facility]  2  [facility]  3  [place]    6  Do you have transportation to take you to all of your appointments? YES  7   Would you like the infusion center to draw labs from your port? (disregard if patient doesn't have a port or need labs for infusion appointment) N/A

## 2022-08-04 NOTE — TELEPHONE ENCOUNTER
Spoke with patient  She is aware of schedule update  She will view her schedule in more detail on her MyChart

## 2022-08-04 NOTE — TELEPHONE ENCOUNTER
Please schedule patient for Venofer  She prefers Friday mornings  She will not be available on 9/21  Thank you!

## 2022-08-04 NOTE — TELEPHONE ENCOUNTER
Spoke with patient  She is okay with all appts being scheduled in [facility]  Can you please schedule her treatments out? Thank you!

## 2022-08-12 ENCOUNTER — HOSPITAL ENCOUNTER (OUTPATIENT)
Dept: INFUSION CENTER | Facility: CLINIC | Age: 47
Discharge: HOME | End: 2022-08-12
Payer: COMMERCIAL

## 2022-08-12 VITALS
SYSTOLIC BLOOD PRESSURE: 132 MMHG | DIASTOLIC BLOOD PRESSURE: 66 MMHG | TEMPERATURE: 97.1 F | RESPIRATION RATE: 16 BRPM | OXYGEN SATURATION: 100 % | HEART RATE: 63 BPM

## 2022-08-12 DIAGNOSIS — R79.0 LOW FERRITIN: Primary | ICD-10-CM

## 2022-08-12 DIAGNOSIS — K91.2 POSTSURGICAL MALABSORPTION: ICD-10-CM

## 2022-08-12 PROCEDURE — 96365 THER/PROPH/DIAG IV INF INIT: CPT

## 2022-08-12 RX ORDER — SODIUM CHLORIDE 9 MG/ML
20 INJECTION, SOLUTION INTRAVENOUS ONCE
Status: CANCELLED | OUTPATIENT
Start: 2022-08-19

## 2022-08-12 RX ORDER — SODIUM CHLORIDE 9 MG/ML
20 INJECTION, SOLUTION INTRAVENOUS ONCE
Status: COMPLETED | OUTPATIENT
Start: 2022-08-12 | End: 2022-08-12

## 2022-08-12 RX ADMIN — SODIUM CHLORIDE 20 ML/HR: 9 INJECTION, SOLUTION INTRAVENOUS at 08:12

## 2022-08-12 RX ADMIN — IRON SUCROSE 200 MG: 20 INJECTION, SOLUTION INTRAVENOUS at 08:13

## 2022-08-12 NOTE — PROGRESS NOTES
Venofer tolerated well without complications  No complaints offered  AVS declined  Left unit in stable condition

## 2022-08-16 ENCOUNTER — HOSPITAL ENCOUNTER (OUTPATIENT)
Dept: RADIOLOGY | Facility: MEDICAL CENTER | Age: 47
Discharge: HOME | End: 2022-08-16
Payer: COMMERCIAL

## 2022-08-16 VITALS — BODY MASS INDEX: 47.91 KG/M2 | HEIGHT: 60 IN | WEIGHT: 244 LBS

## 2022-08-16 DIAGNOSIS — Z12.31 ENCOUNTER FOR SCREENING MAMMOGRAM FOR MALIGNANT NEOPLASM OF BREAST: ICD-10-CM

## 2022-08-16 PROCEDURE — 77067 SCR MAMMO BI INCL CAD: CPT

## 2022-08-16 PROCEDURE — 77063 BREAST TOMOSYNTHESIS BI: CPT

## 2022-08-19 ENCOUNTER — HOSPITAL ENCOUNTER (OUTPATIENT)
Dept: INFUSION CENTER | Facility: CLINIC | Age: 47
Discharge: HOME | End: 2022-08-19
Payer: COMMERCIAL

## 2022-08-19 VITALS
HEART RATE: 55 BPM | RESPIRATION RATE: 18 BRPM | TEMPERATURE: 97.5 F | DIASTOLIC BLOOD PRESSURE: 68 MMHG | SYSTOLIC BLOOD PRESSURE: 114 MMHG

## 2022-08-19 DIAGNOSIS — R79.0 LOW FERRITIN: Primary | ICD-10-CM

## 2022-08-19 DIAGNOSIS — K91.2 POSTSURGICAL MALABSORPTION: ICD-10-CM

## 2022-08-19 PROCEDURE — 96365 THER/PROPH/DIAG IV INF INIT: CPT

## 2022-08-19 RX ORDER — SODIUM CHLORIDE 9 MG/ML
20 INJECTION, SOLUTION INTRAVENOUS ONCE
Status: COMPLETED | OUTPATIENT
Start: 2022-08-19 | End: 2022-08-19

## 2022-08-19 RX ORDER — SODIUM CHLORIDE 9 MG/ML
20 INJECTION, SOLUTION INTRAVENOUS ONCE
Status: CANCELLED | OUTPATIENT
Start: 2022-08-26

## 2022-08-19 RX ADMIN — SODIUM CHLORIDE 20 ML/HR: 9 INJECTION, SOLUTION INTRAVENOUS at 08:30

## 2022-08-19 RX ADMIN — IRON SUCROSE 200 MG: 20 INJECTION, SOLUTION INTRAVENOUS at 08:33

## 2022-08-23 ENCOUNTER — CONSULT (OUTPATIENT)
Dept: BARIATRICS | Facility: CLINIC | Age: 47
End: 2022-08-23
Payer: COMMERCIAL

## 2022-08-23 VITALS
SYSTOLIC BLOOD PRESSURE: 130 MMHG | WEIGHT: 244.8 LBS | HEART RATE: 55 BPM | BODY MASS INDEX: 48.06 KG/M2 | HEIGHT: 60 IN | DIASTOLIC BLOOD PRESSURE: 80 MMHG

## 2022-08-23 DIAGNOSIS — E66.01 OBESITY, CLASS III, BMI 40-49.9 (MORBID OBESITY) (HCC): Primary | ICD-10-CM

## 2022-08-23 PROCEDURE — 99203 OFFICE O/P NEW LOW 30 MIN: CPT | Performed by: FAMILY MEDICINE

## 2022-08-23 NOTE — PROGRESS NOTES
Assessment/Plan:  Jed Marrero was seen today for consult  Diagnoses and all orders for this visit:    Obesity, Class III, BMI 40-49 9 (morbid obesity) ([address] )    Likely secondary to excess caloric intake and lack of physical activity  Patient has already visited with the surgical team for postop follow-up  I recommended meeting with the medical weight management dietitian for menu planning and patient was in agreement  Sample menu provided in the 7705-6900 calorie range  - Discussed options of HealthyCORE-Intensive Lifestyle Intervention Program, Very Low Calorie Diet-VLCD and Conservative Program and the role of weight loss medications  - Explained the importance of making lifestyle changes first before starting any anti-obesity medications  Patient should demonstrate lifestyle changes first before anti-obesity medication can be initiated  - Patient is interested in pursuing Conservative Program  - Initial weight loss goal of 5-10% weight loss for improved health  - Weight loss can improve patient's co-morbid conditions and/or prevent weight-related complications  Goals:  Do not skip any meals! Food log (ie ) www myfitnesspal com,sparkpeople  com,loseit com,calorieking  com,etc  baritastic (use skinnytaste  com, dietdoctor  com or smartphone chelsy [place] for recipes)  No sugary beverages  At least 64oz of water daily  Increase physical activity by 10 minutes daily  Gradually increase physical activity to a goal of 5 days per week for 30 minutes of MODERATE intensity PLUS 2 days per week of FULL BODY resistance training (use smartphone apps FitON, Home Workout, etc )  7527-9110 calories per day    Total time spent: 30 min, with >50% face-to-face time spent counseling patient on nonsurgical interventions for the treatment of excess weight   Discussed in detail nonsurgical options including intensive lifestyle intervention program, very low-calorie diet program and conservative program   Discussed the role of weight loss medications  Counseled patient on diet behavior and exercise modification for weight loss  Follow up in approximately 3 months with Non-Surgical Physician/Advanced Practitioner  Subjective:   Chief Complaint   Patient presents with    Consult     Pt is here for consult  Patient ID: Silvia Wen  is a 40 y o  female with excess weight/obesity here to pursue weight management  Previous notes and records have been reviewed  Past Medical History:   Diagnosis Date    Arthritis     Asthma     GERD (gastroesophageal reflux disease)     Obesity      Past Surgical History:   Procedure Laterality Date    GASTRIC BYPASS         HPI:  Patient presents for medical weight management consultation  History of Nicol-en-Y gastric bypass in 2005  Has had weight regain  Her  is also following with weight management and he is on medication  Patient would like to start medication  States she is currently food logging about 2000 calories daily  Wt Readings from Last 20 Encounters:   08/23/22 111 kg (244 lb 12 8 oz)   08/16/22 111 kg (244 lb)   08/04/22 111 kg (244 lb)   06/30/22 109 kg (239 lb 3 2 oz)   06/23/22 110 kg (242 lb)   06/10/22 109 kg (241 lb 6 4 oz)   05/26/22 105 kg (232 lb 6 4 oz)   03/31/22 105 kg (232 lb)   03/07/22 104 kg (230 lb)   01/27/21 97 1 kg (214 lb)   11/30/20 102 kg (224 lb)   01/31/20 98 kg (216 lb)   02/04/19 95 3 kg (210 lb)   09/18/18 95 3 kg (210 lb)   09/14/18 94 6 kg (208 lb 9 6 oz)   08/24/18 94 3 kg (208 lb)   09/13/17 89 8 kg (198 lb)   09/08/16 117 kg (258 lb 0 9 oz)   05/26/15 109 kg (241 lb 0 6 oz)   02/26/15 109 kg (240 lb)     Obesity/Excess Weight:  Severity: Severe  Onset:  lifelong    Modifiers: Diet and Exercise, Commercial Weight Loss Programs-ie   Weight Watchers, Libby Mandy, Nutrisystem, etc  and RYGB 2005  Contributing factors: Poor Food Choices, Insufficient Physical Activity and Menopause  Associated symptoms: fatigue and increased joint pain    Food logging around 2000cal/day    B- rice cake w/ PB or Quest bar  S- no  L- salad w/ protein  S- protein crackers or apple chips or nuts  D- chicken wrap  S- no    Hydration: 70oz water daily, 16oz coffee daily no cream no sugar  Alcohol: no  Smoking: no  Exercise: not currently  Occupation: sedentary  Sleep: 8hrs    The following portions of the patient's history were reviewed and updated as appropriate: allergies, current medications, past family history, past medical history, past social history, past surgical history, and problem list     Review of Systems   Constitutional: Negative for fever  Respiratory: Negative for shortness of breath  Cardiovascular: Negative for chest pain  Objective:  /80 (BP Location: Left arm, Patient Position: Sitting, Cuff Size: Large)   Pulse 55   Ht 5' (1 524 m)   Wt 111 kg (244 lb 12 8 oz)   LMP 08/13/2022 (Exact Date)   BMI 47 81 kg/m²   Constitutional: Well-developed, well-nourished and Obese Body mass index is 47 81 kg/m²  Lisa Kenny HEENT: No conjunctival injection  Pulmonary: No increased work of breathing or signs of respiratory distress  CV: Well-perfused  GI: Obese  Non-distended  MSK: No edema   Neuro: Oriented to person, place and time  Normal Speech  Normal gait  Psych: Normal affect and mood  Labs and Imaging  Recent labs and imaging have been personally reviewed    Lab Results   Component Value Date    WBC 5 87 06/30/2022    HGB 13 2 06/30/2022    HCT 41 4 06/30/2022    MCV 95 06/30/2022     06/30/2022     Lab Results   Component Value Date     02/26/2015    SODIUM 139 06/30/2022    K 4 2 06/30/2022     06/30/2022    CO2 25 06/30/2022    ANIONGAP 6 02/26/2015    AGAP 8 06/30/2022    BUN 13 06/30/2022    CREATININE 0 78 06/30/2022    GLUF 82 06/30/2022    CALCIUM 9 2 06/30/2022    AST 17 06/30/2022    ALT 22 06/30/2022    ALKPHOS 29 (L) 06/30/2022    PROT 7 5 02/26/2015    TP 7 0 06/30/2022 BILITOT 0 4 02/26/2015    TBILI 0 56 06/30/2022    EGFR 92 06/30/2022     Lab Results   Component Value Date    HGBA1C 5 1 06/30/2022     No results found for: [place] Blood  Lab Results   Component Value Date    CHOLESTEROL 161 06/30/2022     Lab Results   Component Value Date    HDL 75 06/30/2022     Lab Results   Component Value Date    TRIG 89 06/30/2022     Lab Results   Component Value Date    LDLCALC 68 06/30/2022

## 2022-08-30 NOTE — PROGRESS NOTES
Weight Management Medical Nutrition Assessment  Juan Luis Leon is here today for menu planning  Current weight 241#  Juan Luis Leon has worked with RDs and weight management MDs in the past and felt they were not helpful  She has also tried many commercial programs and products  She does not tolerate most carbohydrates but does also admit that she may have some disordered eating/thinking patterns from years of yo-yo dieting and general diet culture misconceptions (good vs  bad foods, avoiding carbohydrates, mentally calculating calories)  Discussed with patient that balance is key  Talked through several other patterns of thinking (allowing food or drink, communication with children, etc )  She is currently logging on [employer] chelsy averaging 1,600-1,700 kcal daily  Discussed calorie range for maintenance and weight loss  Low-calorie meal plan reviewed  Encouraged patient to flex up calories if she does plan to increase physical activity with additional free time  Macro goal of 40-45% carb, 30% fat, and 25-30% protein were discussed  Congratulated patient on maintaining the 96# loss and encouraged her to focus on balanced meals  Patient will f/u x 1 month      Patient seen by Medical Provider in past 6 months:  yes  Requested to schedule appointment with Medical Provider: Yes    Anthropometric Measurements  Start Weight (#): 241# (9/1/22)  Current Weight (#): n/a  TBW % Change from start weight: n/a  Ideal Body Weight (#): 128# (BMI @ 25; 60")  Goal Weight (#): --    Weight Loss History  Previous weight loss attempts: Exercise  Commercial Programs (Weight Watchers, Heloise Prima, etc )  Self Created Diets (Portion Control, Healthy Food Choices, etc )  s/p Nicol-En-Y Gastric Bypass ([place]; 4520)     Food and Nutrition Related History  Wake up: 6:30 am    Food Recall  Breakfast (8 am): Quest protein bar (200 kcal; 21 g protein) + coffee (black) Or 2 slices low-suraj bread + 2 Tbsp peanut butter  Snack: --  Lunch: salad (mozzarella, pistachios, non-starchy vegetables, 1 Tbsp olive garden dressing, will typically have protein in salad) Or lean cuisine Or chicken + broccoli  Snack (2-3 pm): quest bar if not for breakfast Or slim elicia Or protein chips (quest, atkins) Or fruit (typically avoids fruit because of carbs) Or string cheese   Dinner: tacos (lean ground beef, low-carb wrap, sprinkle of cheese) Or protein/veggie (broccoli or cauliflower)/typically no carbs Or scrambled eggs w/ veggies (1 egg, 2 egg whites) + 3 slices bowen  Snack: all-natural ice pop     Beverages: water and coffee (16 oz; black)  Volume of beverage intake: 70 ounces    Weekends: Worse, may have more alcohol or dining out (will always get a salad with protein)  Cravings: salt  Trouble area of day:     Frequency of Eating out: maybe 1-2x/week  Food restrictions: avoids carbs, won't eat fish/yogurt/cottage cheese  Cooking: self  Food Shopping: self    Occupation: Consultant for pharmaceuticals (sedentary)    Physical Activity  Activity: No structured routine [wants to join gym in next two weeks]  Frequency:rarely  Physical limitations/barriers to exercise: arthritis pain, inflamed tendon    Estimated Needs  [facility] Energy Needs (Needs at 244 8#)  BMR: 1,677 kcal  Maintenance calories (sedentary): 2,012 kcal  1-2# loss weekly sedentary: 1,012-1,512 kcal  1-2# loss weekly lightly active: 1,306-1,806 kcal    Protein: 70-87 grams (1 2-1 5g/kg IBW)  Fluid: 68 ounces (35mL/kg IBW)    Nutrition Diagnosis  Yes;     Overweight/obesity  related to Excess energy intake as evidenced by  BMI more than normative standard for age and sex (obesity-grade III 36+)       Nutrition Intervention    Nutrition Prescription  Calories: 1,200-1,400 kcal (flex to 1,500 kcal if participating in moderate physical activity)  Protein:  g  Fluid: 68+ ounces    Meal Plan (Matt/Pro)  Breakfast: 200-300/20-30  Snack: --  Lunch: 400/20-30  Snack: 100-150/5+  Dinner: 400/20-30  Snack: 100-150/5+    Nutrition Education  Calorie controlled menu  Lean protein food choices  Healthy snack options  Food journaling tips    Nutrition Counseling  Strategies: meal planning, portion sizes, healthy snack choices, hydration, fiber intake, protein intake, exercise, food logging    Monitoring and Evaluation:    Evaluation criteria  Energy Intake  Meet protein needs  Maintain adequate hydration  Monitor weekly weight  Meal planning/preparation  Food journal   Decreased portions at mealtimes and snacks  Physical activity     Barriers to learning:none  Readiness to change: Preparation:  (Getting ready to change)  and Action:  (Changing behavior)  Comprehension: very good  Expected Compliance: good

## 2022-08-31 ENCOUNTER — TELEPHONE (OUTPATIENT)
Dept: BARIATRICS | Facility: CLINIC | Age: 47
End: 2022-08-31

## 2022-08-31 NOTE — TELEPHONE ENCOUNTER
Left patient vm to call office and reschedule appointment on 11/23/22 with Dr James Gallegos for a follow up-provider will not be in that day  [facility]

## 2022-09-01 ENCOUNTER — OFFICE VISIT (OUTPATIENT)
Dept: BARIATRICS | Facility: CLINIC | Age: 47
End: 2022-09-01

## 2022-09-01 VITALS — BODY MASS INDEX: 47.32 KG/M2 | WEIGHT: 241 LBS | HEIGHT: 60 IN

## 2022-09-01 DIAGNOSIS — R79.0 LOW FERRITIN: Primary | ICD-10-CM

## 2022-09-01 DIAGNOSIS — R63.5 ABNORMAL WEIGHT GAIN: Primary | ICD-10-CM

## 2022-09-01 DIAGNOSIS — K91.2 POSTSURGICAL MALABSORPTION: ICD-10-CM

## 2022-09-01 PROCEDURE — RECHECK

## 2022-09-01 PROCEDURE — WMDI60

## 2022-09-01 RX ORDER — SODIUM CHLORIDE 9 MG/ML
20 INJECTION, SOLUTION INTRAVENOUS ONCE
Status: CANCELLED | OUTPATIENT
Start: 2022-09-02

## 2022-09-02 ENCOUNTER — HOSPITAL ENCOUNTER (OUTPATIENT)
Dept: INFUSION CENTER | Facility: CLINIC | Age: 47
End: 2022-09-02
Payer: COMMERCIAL

## 2022-09-02 VITALS
SYSTOLIC BLOOD PRESSURE: 116 MMHG | TEMPERATURE: 97.3 F | DIASTOLIC BLOOD PRESSURE: 56 MMHG | RESPIRATION RATE: 18 BRPM | HEART RATE: 97 BPM | OXYGEN SATURATION: 97 %

## 2022-09-02 DIAGNOSIS — K91.2 POSTSURGICAL MALABSORPTION: ICD-10-CM

## 2022-09-02 DIAGNOSIS — R79.0 LOW FERRITIN: Primary | ICD-10-CM

## 2022-09-02 PROCEDURE — 96365 THER/PROPH/DIAG IV INF INIT: CPT

## 2022-09-02 RX ORDER — SODIUM CHLORIDE 9 MG/ML
20 INJECTION, SOLUTION INTRAVENOUS ONCE
Status: COMPLETED | OUTPATIENT
Start: 2022-09-02 | End: 2022-09-02

## 2022-09-02 RX ORDER — SODIUM CHLORIDE 9 MG/ML
20 INJECTION, SOLUTION INTRAVENOUS ONCE
Status: CANCELLED | OUTPATIENT
Start: 2022-09-09

## 2022-09-02 RX ADMIN — SODIUM CHLORIDE 20 ML/HR: 9 INJECTION, SOLUTION INTRAVENOUS at 08:52

## 2022-09-02 RX ADMIN — IRON SUCROSE 200 MG: 20 INJECTION, SOLUTION INTRAVENOUS at 08:52

## 2022-09-07 ENCOUNTER — OFFICE VISIT (OUTPATIENT)
Dept: FAMILY MEDICINE CLINIC | Facility: CLINIC | Age: 47
End: 2022-09-07
Payer: COMMERCIAL

## 2022-09-07 VITALS
TEMPERATURE: 97.3 F | RESPIRATION RATE: 16 BRPM | BODY MASS INDEX: 47.51 KG/M2 | HEART RATE: 54 BPM | OXYGEN SATURATION: 98 % | HEIGHT: 60 IN | DIASTOLIC BLOOD PRESSURE: 70 MMHG | WEIGHT: 242 LBS | SYSTOLIC BLOOD PRESSURE: 114 MMHG

## 2022-09-07 DIAGNOSIS — Z01.818 PREOP EXAMINATION: Primary | ICD-10-CM

## 2022-09-07 PROCEDURE — 99242 OFF/OP CONSLTJ NEW/EST SF 20: CPT | Performed by: FAMILY MEDICINE

## 2022-09-07 PROCEDURE — 3725F SCREEN DEPRESSION PERFORMED: CPT | Performed by: FAMILY MEDICINE

## 2022-09-07 NOTE — PROGRESS NOTES
Presurgical Evaluation    Subjective:      Patient ID: Jarad Cruz is a 40 y o  female  Chief Complaint   Patient presents with    Pre-op Exam     9/21/22 right eye, macular tear  HPI    The following portions of the patient's history were reviewed and updated as appropriate: allergies, current medications, past family history, past medical history, past social history, past surgical history and problem list     Procedure date: 9/21/22    Surgeon: Sofia Sommers MD  Planned procedure:  Retinal repair  Diagnosis for procedure:  Retinal Tear    Prior anesthesia: Yes   General; Complications:  None / Tolerated well    CAD History: None (significant family history of cardiovascular disease)    NOTE: Patient should see Cardiology if time available before surgery, and if appropriate  Pulmonary History: Asthma (well controlled)    Renal history: None    Diabetes History:  None     Neurological History: None     On Immunosuppressant meds/biologics: No    Review of Systems   Constitutional: Negative for chills and fever  HENT: Negative for congestion, rhinorrhea and sore throat  Eyes: Negative for visual disturbance (incidetal finding on the right eye after evaluation )  Respiratory: Negative for chest tightness and shortness of breath  Cardiovascular: Negative for chest pain  Gastrointestinal: Negative for abdominal pain  Musculoskeletal:        Pain with site of injection (iron infusion)   Neurological: Negative for headaches             Current Outpatient Medications   Medication Sig Dispense Refill    albuterol (PROVENTIL HFA,VENTOLIN HFA) 90 mcg/act inhaler Inhale 2 puffs every 6 (six) hours as needed      calcium citrate (CALCITRATE) 950 (200 Ca) MG tablet Take 1 tablet by mouth daily      cyanocobalamin (VITAMIN B-12) 100 mcg tablet       Docusate Calcium (STOOL SOFTENER PO) Take by mouth      ENBREL SURECLICK 50 MG/ML injection       etanercept (ENBREL) 50 mg/mL SOSY Inject under the skin      Daisy 0 25-35 MG-MCG per tablet TAKE 1 TABLET BY MOUTH EVERY DAY 84 tablet 3    VITAMIN D PO Take by mouth      ferrous sulfate 325 (65 FE) MG EC tablet Take 325 mg by mouth 3 (three) times a day with meals (Patient not taking: Reported on 9/7/2022)      Multiple Vitamin (MULTI-VITAMIN DAILY) TABS Take by mouth (Patient not taking: No sig reported)      PEDIATRIC MULTIVITAMINS-IRON PO  (Patient not taking: No sig reported)       No current facility-administered medications for this visit  Allergies on file:   Patient has no known allergies      Patient Active Problem List   Diagnosis    Encounter for surgical aftercare following surgery of digestive system    Postsurgical malabsorption    Psoriatic arthritis ([facility] [address]    Annual physical exam    Low ferritin    Preop examination        Past Medical History:   Diagnosis Date    Arthritis     Asthma     GERD (gastroesophageal reflux disease)     Obesity        Past Surgical History:   Procedure Laterality Date    GASTRIC BYPASS         Family History   Problem Relation Age of Onset    Breast cancer Mother 58    Heart disease Father     No Known Problems Sister     No Known Problems Daughter     No Known Problems Maternal Grandmother     Diabetes Maternal Grandfather     No Known Problems Paternal Grandmother     No Known Problems Paternal Grandfather     No Known Problems Brother     No Known Problems Son     No Known Problems Maternal Uncle     No Known Problems Paternal Aunt        Social History     Tobacco Use    Smoking status: Never Smoker    Smokeless tobacco: Never Used   Vaping Use    Vaping Use: Never used   Substance Use Topics    Alcohol use: Yes     Comment: socially    Drug use: No       Objective:    Vitals:    09/07/22 0859   BP: 114/70   BP Location: Left arm   Patient Position: Sitting   Cuff Size: Large   Pulse: (!) 54   Resp: 16   Temp: (!) 97 3 °F (36 3 °C)   TempSrc: Temporal   SpO2: 98%   Weight: 110 kg (242 lb)   Height: 5' (1 524 m)        Physical Exam  Vitals reviewed  Constitutional:       General: She is not in acute distress  Appearance: Normal appearance  She is obese  She is not ill-appearing, toxic-appearing or diaphoretic  Cardiovascular:      Rate and Rhythm: Normal rate and regular rhythm  Pulses: Normal pulses  Heart sounds: Normal heart sounds  No murmur heard  Pulmonary:      Effort: Pulmonary effort is normal  No respiratory distress  Breath sounds: Normal breath sounds  Abdominal:      General: Abdomen is flat  Bowel sounds are normal  There is no distension  Palpations: Abdomen is soft  Musculoskeletal:         General: No swelling  Normal range of motion  Skin:     General: Skin is warm and dry  Capillary Refill: Capillary refill takes less than 2 seconds  Coloration: Skin is not jaundiced  Neurological:      General: No focal deficit present  Mental Status: She is alert and oriented to person, place, and time  Psychiatric:         Mood and Affect: Mood normal             Preop labs/testing available and reviewed: no  CBC, CMP, iron panel, hemoglobin A1c completed on 2022  EKG no    Echo no    Stress test/cath no    PFT/Arvind no    Functional capacity: Shovel snow                          6 Mets   Pick the highest level patient can comfortably perform   4 mets or greater for surgery    RCRI  High Risk surgery? 1 Point  CAD History:         1 Point   MI; Positive Stress Test; CP due to Mi;  Nitrate Usage to control Angina;  Pathologic Q wave on EKG  CHF Active:         1 Point   Pulm Edema; Paroxysmal Nocturnal Dyspnea;  Bibasilar Rales (crackles);S3; CHF on CXR  Cerebrovascular Disease (TIA or CVA):     1 Point  DM on Insulin:        1 Point  Serum Creat >2 0 mg/dl:       1 Point          Total Points: 0     Scorin: Class I, Very Low Risk (0 4%)     1: Class II, Low risk (0 9%)     2: Class III Moderate (6 6%)     3: Class IV High (>11%)      KARL Risk:  GFR:        For PCP:  If GFR>60, Hold ACE/ARB/Diuretic on the day of surgery, and NSAIDS 10 days before  If GFR<45, Consider PRE and POST op Nephrology Consult  If 46 <GFR> 59 : Has Patient had KARL in last 6 Months? no   If YES: Preop Nephrology consult   If No:  Jennifer 26 Nephrology consult  Assessment/Plan:    Patient is medically optimized (cleared) for the planned procedure  Further testing/evaluation is not required  Postop concerns: no    Problem List Items Addressed This Visit        Other    Preop examination - Primary     preop exam completed at this time  No additional lab required at this time  Patient is currently medically optimized for her up coming procedure  Based on RCRI, there is a 0 4% chance of major adverse cardiac event                There are no diagnoses linked to this encounter  Pre-Surgery Instructions:   Medication Instructions    albuterol (PROVENTIL HFA,VENTOLIN HFA) 90 mcg/act inhaler per anesthesia guidelines     calcium citrate (CALCITRATE) 950 (200 Ca) MG tablet per anesthesia guidelines     cyanocobalamin (VITAMIN B-12) 100 mcg tablet per anesthesia guidelines     Docusate Calcium (STOOL SOFTENER PO) per anesthesia guidelines     ENBREL SURECLICK 50 MG/ML injection per anesthesia guidelines     etanercept (ENBREL) 50 mg/mL SOSY per anesthesia guidelines     Daisy 0 25-35 MG-MCG per tablet per anesthesia guidelines     VITAMIN D PO per anesthesia guidelines         NOTE: Please use the above to review important meds for your specialty, the remainder "per anesthesia Guidelines "    NOTE: Please place an Inbasket message for "[facility]" [facility] for complicated patients  DEB Berman  Family Medicine    Please excuse any "sound-alike" errors that may have ocurred during the process of dictation   Parts of this note have been dictated and there may be errors present in the transcription process  Thank you

## 2022-09-07 NOTE — ASSESSMENT & PLAN NOTE
preop exam completed at this time  No additional lab required at this time  Patient is currently medically optimized for her up coming procedure  Based on RCRI, there is a 0 4% chance of major adverse cardiac event

## 2022-09-09 ENCOUNTER — HOSPITAL ENCOUNTER (OUTPATIENT)
Dept: INFUSION CENTER | Facility: CLINIC | Age: 47
End: 2022-09-09
Payer: COMMERCIAL

## 2022-09-09 VITALS
DIASTOLIC BLOOD PRESSURE: 55 MMHG | TEMPERATURE: 97.6 F | SYSTOLIC BLOOD PRESSURE: 121 MMHG | RESPIRATION RATE: 17 BRPM | HEART RATE: 58 BPM

## 2022-09-09 DIAGNOSIS — K91.2 POSTSURGICAL MALABSORPTION: ICD-10-CM

## 2022-09-09 DIAGNOSIS — R79.0 LOW FERRITIN: Primary | ICD-10-CM

## 2022-09-09 PROCEDURE — 96365 THER/PROPH/DIAG IV INF INIT: CPT

## 2022-09-09 RX ORDER — SODIUM CHLORIDE 9 MG/ML
20 INJECTION, SOLUTION INTRAVENOUS ONCE
Status: CANCELLED | OUTPATIENT
Start: 2022-09-16

## 2022-09-09 RX ORDER — SODIUM CHLORIDE 9 MG/ML
20 INJECTION, SOLUTION INTRAVENOUS ONCE
Status: COMPLETED | OUTPATIENT
Start: 2022-09-09 | End: 2022-09-09

## 2022-09-09 RX ADMIN — IRON SUCROSE 200 MG: 20 INJECTION, SOLUTION INTRAVENOUS at 08:56

## 2022-09-09 RX ADMIN — SODIUM CHLORIDE 20 ML/HR: 9 INJECTION, SOLUTION INTRAVENOUS at 08:52

## 2022-09-09 NOTE — PROGRESS NOTES
Pt to clinic for venofer  Pt offers no complaints today  Tolerated infusions without complications  Pt aware of next appointment  PIV removed  AVS was offered, pt declined  Pt ambulated out of clinic safely

## 2022-09-16 ENCOUNTER — HOSPITAL ENCOUNTER (OUTPATIENT)
Dept: INFUSION CENTER | Facility: CLINIC | Age: 47
End: 2022-09-16
Payer: COMMERCIAL

## 2022-09-16 VITALS
HEART RATE: 49 BPM | DIASTOLIC BLOOD PRESSURE: 59 MMHG | RESPIRATION RATE: 16 BRPM | SYSTOLIC BLOOD PRESSURE: 115 MMHG | TEMPERATURE: 97.2 F

## 2022-09-16 DIAGNOSIS — K91.2 POSTSURGICAL MALABSORPTION: ICD-10-CM

## 2022-09-16 DIAGNOSIS — R79.0 LOW FERRITIN: Primary | ICD-10-CM

## 2022-09-16 PROCEDURE — 96365 THER/PROPH/DIAG IV INF INIT: CPT

## 2022-09-16 RX ORDER — SODIUM CHLORIDE 9 MG/ML
20 INJECTION, SOLUTION INTRAVENOUS ONCE
Status: COMPLETED | OUTPATIENT
Start: 2022-09-16 | End: 2022-09-16

## 2022-09-16 RX ORDER — SODIUM CHLORIDE 9 MG/ML
20 INJECTION, SOLUTION INTRAVENOUS ONCE
Status: CANCELLED | OUTPATIENT
Start: 2022-09-23

## 2022-09-16 RX ADMIN — SODIUM CHLORIDE 20 ML/HR: 9 INJECTION, SOLUTION INTRAVENOUS at 08:46

## 2022-09-16 RX ADMIN — IRON SUCROSE 200 MG: 20 INJECTION, SOLUTION INTRAVENOUS at 08:47

## 2022-09-16 NOTE — PROGRESS NOTES
Pt presents for venofer infusion offering no complaints  Pt tolerated treatment without incident  PIV removed  AVS declined, next appointment reviewed  Pt's wife was informed that Dr. Venegas 

## 2022-09-28 DIAGNOSIS — R79.0 LOW FERRITIN: Primary | ICD-10-CM

## 2022-09-28 DIAGNOSIS — K91.2 POSTSURGICAL MALABSORPTION: ICD-10-CM

## 2022-09-28 RX ORDER — SODIUM CHLORIDE 9 MG/ML
20 INJECTION, SOLUTION INTRAVENOUS ONCE
Status: CANCELLED | OUTPATIENT
Start: 2022-09-30

## 2022-09-30 ENCOUNTER — HOSPITAL ENCOUNTER (OUTPATIENT)
Dept: INFUSION CENTER | Facility: CLINIC | Age: 47
End: 2022-09-30
Payer: COMMERCIAL

## 2022-09-30 VITALS
DIASTOLIC BLOOD PRESSURE: 58 MMHG | SYSTOLIC BLOOD PRESSURE: 119 MMHG | HEART RATE: 58 BPM | TEMPERATURE: 97.8 F | RESPIRATION RATE: 16 BRPM

## 2022-09-30 DIAGNOSIS — K91.2 POSTSURGICAL MALABSORPTION: ICD-10-CM

## 2022-09-30 DIAGNOSIS — R79.0 LOW FERRITIN: Primary | ICD-10-CM

## 2022-09-30 PROCEDURE — 96365 THER/PROPH/DIAG IV INF INIT: CPT

## 2022-09-30 RX ORDER — SODIUM CHLORIDE 9 MG/ML
20 INJECTION, SOLUTION INTRAVENOUS ONCE
Status: COMPLETED | OUTPATIENT
Start: 2022-09-30 | End: 2022-09-30

## 2022-09-30 RX ORDER — SODIUM CHLORIDE 9 MG/ML
20 INJECTION, SOLUTION INTRAVENOUS ONCE
Status: CANCELLED | OUTPATIENT
Start: 2022-10-07

## 2022-09-30 RX ADMIN — SODIUM CHLORIDE 200 MG: 9 INJECTION, SOLUTION INTRAVENOUS at 08:48

## 2022-09-30 RX ADMIN — SODIUM CHLORIDE 20 ML/HR: 9 INJECTION, SOLUTION INTRAVENOUS at 08:47

## 2022-09-30 NOTE — PROGRESS NOTES
Pt presents for venofer infusion offering no complaints  Pt tolerated treatment without incident  PIV removed  AVS declined, therapy plan complete

## 2022-10-04 NOTE — PROGRESS NOTES
Weight Management Medical Nutrition Assessment  Clarissa Pelaez is here today for menu planning  Current Weight: 235#  She has lost 6# x 1 month  Clarissa Pelaez has made great dietary changes such as consistently logging (1,000-1,300 kcal), increasing her protein intake, and exploring food items within her calorie and protein goals that she enjoys  She has been unable to exercise due to a recent eye surgery but can resume in 6-8 weeks  Patient displays a more balanced and positive relationship with her dietary intake and is satisfied with weight loss  Will f/u with RD bundles  Keep up the great work!     Patient seen by Medical Provider in past 6 months:  yes  Requested to schedule appointment with Medical Provider: Yes    Anthropometric Measurements  Start Weight (#): 241# (9/1/22)  Current Weight (#): 235#  TBW % Change from start weight: 2 5%  Ideal Body Weight (#): 128# (BMI @ 25; 60")  Goal Weight (#): --    Weight Loss History  Previous weight loss attempts: Exercise  Commercial Programs (Weight Watchers, [employer], etc )  Self Created Diets (Portion Control, Healthy Food Choices, etc )  s/p Nicol-En-Y Gastric Bypass ([place]; 3726)     Food and Nutrition Related History  Wake up: 6:30 am    Food Recall  Breakfast (8 am): Quest protein bar (200 kcal; 21 g protein) + coffee (black) Or 2 slices low-suraj bread + 1 Tbsp peanut butter   Snack: --  Lunch: sandwich (0 5 oz LF cheese + peppered turkey + 647 bread + lettuce + vinegar) Or lean cuisine Or dinner leftovers (taco meat + quest chips)  Snack (2-3 pm): quest bar if not for breakfast to increase protein Or pure protein cheddar crackers Or quest chips Or hummus + nut thins + veggies  Dinner: tacos (lean ground beef, low-carb wrap, sprinkle of cheese) Or protein/veggie (broccoli or cauliflower)/typically no carbs Or scrambled eggs w/ veggies (1 egg, 2 egg whites) + 3 slices bowen  Snack: may skip Or LF popcorn Or all-natural ice pop     Beverages: water and coffee (16 oz; black)  Volume of beverage intake: 50-70 ounces    Weekends: Worse, may have more alcohol or dining out (will always get a salad with protein)  Cravings: salt  Trouble area of day:     Frequency of Eating out: maybe 1-2x/week  Food restrictions: avoids carbs, won't eat fish/yogurt/cottage cheese  Cooking: self  Food Shopping: self    Occupation: Consultant for pharmaceuticals (sedentary)    Physical Activity  Activity: No structured routine [Had eye surgery so she is unable to exercise right now (6-8 weeks)]  Frequency:rarely  Physical limitations/barriers to exercise: arthritis pain, inflamed tendon    Estimated Needs  [employer] Needs (Needs at 235#)  BMR: 1,632 kcal  Maintenance calories (sedentary): 1,959 kcal  1-2# loss weekly sedentary: 959-1,459 kcal  1-2# loss weekly lightly active: 1,245-1,745 kcal    Protein: 70-87 grams (1 2-1 5g/kg IBW)  Fluid: 68 ounces (35mL/kg IBW)    Nutrition Diagnosis  Yes;     Overweight/obesity  related to Excess energy intake as evidenced by  BMI more than normative standard for age and sex (obesity-grade III 36+)     Nutrition Intervention    Nutrition Prescription  Calories: 1,100-1,300 kcal   Protein:  g  Fluid: 68+ ounces    Meal Plan (Matt/Pro)  Breakfast: 200-300/20-30  Snack: --  Lunch: 400/20-30  Snack: 100-150/5+  Dinner: 400/20-30  Snack: 100-150/5+    Nutrition Education  Calorie controlled menu  Lean protein food choices  Healthy snack options  Food journaling tips    Nutrition Counseling  Strategies: meal planning, portion sizes, healthy snack choices, hydration, fiber intake, protein intake, exercise, food logging    Monitoring and Evaluation:    Evaluation criteria  Energy Intake  Meet protein needs  Maintain adequate hydration  Monitor weekly weight  Meal planning/preparation  Food journal   Decreased portions at mealtimes and snacks  Physical activity     Barriers to learning:none  Readiness to change: Action:  (Changing behavior)  Comprehension: very good  Expected Compliance: good

## 2022-10-06 ENCOUNTER — OFFICE VISIT (OUTPATIENT)
Dept: BARIATRICS | Facility: CLINIC | Age: 47
End: 2022-10-06

## 2022-10-06 VITALS — BODY MASS INDEX: 46.13 KG/M2 | HEIGHT: 60 IN | WEIGHT: 235 LBS

## 2022-10-06 DIAGNOSIS — R63.5 ABNORMAL WEIGHT GAIN: Primary | ICD-10-CM

## 2022-10-06 PROCEDURE — RECHECK

## 2022-10-06 PROCEDURE — WMDI30

## 2022-10-12 NOTE — PROGRESS NOTES
Bariatric Nutrition Note    Transfer care:   s/p lap Nicol-En-Y Gastric Bypass in [facility] in 2005  UGI 04/29/22: "Small sliding hiatal hernia with multiple episodes of moderate spontaneous gastroesophageal reflux "     Referred by Chester Morales PA-C    CC: Weight regain - up about 40lbs from her moncho    Height:5'0"   Current Weight: 242#  BMI: 47 3  Weight prior to  Weight Loss Surgery: 337#  Weight in (lb) to have BMI = 25: 128#  MONCHO: 190#  Regain: approx 50#  (recently gained 20# of the 50)      Vitamin Regimen: Flintstones MVI, 5,000mcg sublingual B12  Was advised by Yuliya Pickard to start Jason MVI w/ 45mg iron and calcium citrate 1500mg daily -  Bloodwork needs to be  completed     Diet and exercise:    · Tolerating a regular diet - Yes  · 3 meals: Yes  · Snacking/grazing: no  · Volume: 1 cup  · Eating at least 60 grams of protein per day Yes   · Protein drinks: Shakeology  · Following 30/60 minute rule with liquids- Stopped but restarted waiting 60   · Eating/drinking: chewing food well- sipping fluids  · Drinking at least 64 ounces of fluid per day- Yes + Coffee  · Drinking carbonated beverages-no  · Food logging - knows calories and macros   · GI discomforts Some heartburn  · Exercise: Stopped  - used to go to [employer] [facility] [employer]  · Other -    Runs owns business; has 2 children ( son has insulin resistance) - 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  Recall: 1300 calories maintains weight  PB (measures portions)  B- Rice cake with PB  11:00 amSnack - maybe granola bar if hungry  L - Cucumbers and tomato salad w/ feta cheese and pistachios or green salad with chicken or feta - [place] dressing   3:00 pm   Nuts, Pure Protein Cheese Crax, kale chips or fruit  D - chicken or lean beef and veggies  Stops eating after 7pms     Fluids - 16-32oz black coffee, 60oz water, rare ETOH    Visit Summary  Transfer care   Pt lost approx  150lbs s/p surgery and then subsequently had 2 children (2008 and 2011)>  gained PAIN MANAGEMENT FOLLOW-UP NOTE  10/12/22    CHIEF COMPLAINT: This is a67 y.o. male patientwho returns to the Pain Management Clinic with a history of Lower Back Pain (R has improved/ now notices a little on the left-- feeling good besides \"normal aches\") and Follow Up After Procedure (improved)      PAIN HPI:Baltazar Solomon returns today for  reevaluation. Since the visit, the patient reports that the pain is not changed. Good relief  R L4-5 5S1 Facet  injection  Ro lumbar pain  80 % better and less back stiffness     Location:  -  Location Modifier: -  Severity of Pain: -  Duration of Pain: Intermittent  Frequency of Pain: Intermittent  Aggravating Factors:  -  Limiting Behavior:  -  Relieving Factors: relaxation        Previous pain medication trials have included:          Mental health:    Patient feels - secondary to their current pain problems as described above. H/O depression and anxiety: No   Patient is not seeing psychologist orpsychiatrist   Abuse history? No    Employed? No    ANALGESIA:   Are your Current Pain medication (s) helping to decrease pain? No.   Current Pain score:      ADVERSE AFFECTS:   Medication Side Effects: No.    ACTIVITY:  Are you able to be more active with your pain medications? No      ABERRANT BEHAVIORS SINCE LAST VISIT? No    Review of Systems   Constitutional:  Positive for fatigue. HENT: Negative. Eyes: Negative. Respiratory: Negative. Cardiovascular: Negative. Gastrointestinal:  Negative for constipation and nausea. Endocrine: Negative. Genitourinary:  Negative for difficulty urinating. Musculoskeletal:  Positive for arthralgias, back pain and myalgias. Skin: Negative. Allergic/Immunologic: Negative. Neurological:  Positive for weakness and numbness. Hematological: Negative. Psychiatric/Behavioral:  Positive for sleep disturbance. All other systems reviewed and are negative.      No Known Allergies    Outpatient Medications Prior to Visit   Medication Sig Dispense Refill    metoprolol succinate (TOPROL XL) 25 MG extended release tablet Take 25 mg by mouth daily      aspirin 81 MG EC tablet Take 81 mg by mouth in the morning. folic acid (FOLVITE) 1 MG tablet Take 1 mg by mouth in the morning. hydroCHLOROthiazide (MICROZIDE) 12.5 MG capsule Take 12.5 mg by mouth in the morning.      montelukast (SINGULAIR) 10 MG tablet Take 10 mg by mouth nightly      rosuvastatin (CRESTOR) 5 MG tablet Take 5 mg by mouth in the morning. valsartan (DIOVAN) 80 MG tablet Take 80 mg by mouth in the morning. blood glucose monitor strips 1 strip by Other route      Lancets MISC 1 each by Does not apply route daily       No facility-administered medications prior to visit.        Past Medical History:   Diagnosis Date    Ankylosing spondylitis ([facility] [address] 1977    BPH (benign prostatic hyperplasia)     CAD (coronary artery disease)     CKD (chronic kidney disease)     CRI (chronic renal insufficiency)     Diverticulosis     DVT (deep venous thrombosis) (HCC)     Essential hypertension     Hyperlipemia     Hypertension     Hypertensive chronic kidney disease with stage 1 through stage 4 chronic kidney disease, or unspecified chronic kidney disease     MTHFR (methylene THF reductase) deficiency and homocystinuria (HCC)     Osteoarthritis     Seasonal allergies     Urge incontinence        Past Surgical History:   Procedure Laterality Date    CARDIAC CATHETERIZATION  05/07/2008    CATARACT EXTRACTION, BILATERAL  2018    COLONOSCOPY  2011    CYSTOSCOPY  10/19/2006    TONSILLECTOMY      TOTAL KNEE ARTHROPLASTY  04/27/2021    TURP  06/12/2014     Family History   Problem Relation Age of Onset    Heart Failure Mother     Amyotrophic lateral sclerosis Father      Social History     Socioeconomic History    Marital status: Unknown     Spouse name: None    Number of children: None    Years of education: None    Highest education level: None   Tobacco Use    Smoking about 32lbs with each pregnancy and lost the weight after  Very familiar with dieting > tried keto, exercise, HCG diets, etc since regain but not able to sustain weight loss  Reviewed post op guidelines to help pt optimize use of her tool  Also discussed macros/calories to aim for to initiate weight loss  Discussed more balance and include exercise into her daily routine  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 in sports so hax minimal time to herself  Reviewed vitamin regimen and provided samples to trial  Also discussed protein supplement vs "grazing"    Provided  recommended macros and calories for post op needs guidelines   Goals  · consult with Medical Weight Management - has appt with Dr Gato Jean status: Former     Types: Cigars     Start date: 0     Quit date:      Years since quittin.8    Smokeless tobacco: Never   Substance and Sexual Activity    Alcohol use: Yes     Comment: one beer every two months    Drug use: Never         Family and Social Historyreviewed in the electronic medical record. Imaging:Reviewed available imaging in our system with the patient. No results found. Objective:     Physical Exam:  Vitals:    10/12/22 1227 10/12/22 1256   BP: (!) 150/80 (!) 150/80   Site: Left Upper Arm    Position: Sitting    Cuff Size: Large Adult    Pulse: 80    Weight: 252 lb (114.3 kg)    Height: 5' 11\" (1.803 m)           Physical Exam  Musculoskeletal:      Lumbar back: Negative right straight leg raise test and negative left straight leg raise test.     Back Exam     Tenderness   The patient is experiencing tenderness in the lumbar (mild Kemps R). Range of Motion   Extension:  normal   Flexion:  normal   Lateral bend right:  normal   Lateral bend left:  normal   Rotation right:  normal   Rotation left:  normal     Muscle Strength   Right Quadriceps:  5/5   Left Quadriceps:  5/5   Right Hamstrings:  5/5   Left Hamstrings:  5/5     Tests   Straight leg raise right: negative  Straight leg raise left: negative    Other   Toe walk: normal  Heel walk: normal  Sensation: normal  Gait: normal                                 Research  has found that  Spine injections    reduce pain and  give  better functional  outcomes. Assessment: This is a 79 y.o. male patient with:    Diagnosis:   Diagnosis Orders   1. Lumbar facet joint syndrome        2. Lumbosacral spondylosis without myelopathy            Medical Comorbidities:  As listed in the patient's past medical and surgical history    Functional Limitations:   Pain limits function and quality of life.      Plan:   Leaving for [place]    in 2 weeks       Would hold on any more injections   Can have our note     Meds:   Controlled Substances Monitoring: Pt educated about the risks of taking opiates,including increased sedation, constipation, slowed breathing, tolerance, dependence,and addiction. New Prescriptions    No medications on file      No orders of the defined types were placed in this encounter. No orders of the defined types were placed in this encounter. No follow-ups on file. Opioid medication has  significant  risk  benefit concerns. We  instruct    our patients that  a Random Urine Drug Screen is required  along with a  an Opioid assessment questionaire such as ORT  or SOAPP  The patient expressed understanding of the above assessment and plan. Totaltime spent face to face with patient was 25 minutes inwhich  50% or more of the time was spent in counseling, education about risk andbenefits of the above plan, and coordination of care.

## 2022-11-09 ENCOUNTER — FOLLOW UP (OUTPATIENT)
Dept: URBAN - METROPOLITAN AREA CLINIC 6 | Facility: CLINIC | Age: 47
End: 2022-11-09

## 2022-11-09 DIAGNOSIS — Z98.890: ICD-10-CM

## 2022-11-09 DIAGNOSIS — H25.041: ICD-10-CM

## 2022-11-09 DIAGNOSIS — H25.12: ICD-10-CM

## 2022-11-09 PROCEDURE — 92014 COMPRE OPH EXAM EST PT 1/>: CPT

## 2022-11-09 PROCEDURE — 92134 CPTRZ OPH DX IMG PST SGM RTA: CPT

## 2022-11-09 ASSESSMENT — VISUAL ACUITY: OS_CC: 20/30+1

## 2022-11-09 ASSESSMENT — TONOMETRY
OD_IOP_MMHG: 8
OS_IOP_MMHG: 14

## 2022-11-15 ENCOUNTER — APPOINTMENT (EMERGENCY)
Dept: CT IMAGING | Facility: HOSPITAL | Age: 47
End: 2022-11-15

## 2022-11-15 ENCOUNTER — HOSPITAL ENCOUNTER (EMERGENCY)
Facility: HOSPITAL | Age: 47
Discharge: HOME | End: 2022-11-15
Attending: EMERGENCY MEDICINE

## 2022-11-15 VITALS
RESPIRATION RATE: 18 BRPM | TEMPERATURE: 98.4 F | OXYGEN SATURATION: 98 % | SYSTOLIC BLOOD PRESSURE: 122 MMHG | HEART RATE: 54 BPM | DIASTOLIC BLOOD PRESSURE: 61 MMHG

## 2022-11-15 DIAGNOSIS — H54.61 VISION LOSS, RIGHT EYE: Primary | ICD-10-CM

## 2022-11-15 LAB
ALBUMIN SERPL BCP-MCNC: 4.3 G/DL (ref 3.5–5)
ALP SERPL-CCNC: 39 U/L (ref 34–104)
ALT SERPL W P-5'-P-CCNC: 26 U/L (ref 7–52)
ANION GAP SERPL CALCULATED.3IONS-SCNC: 10 MMOL/L (ref 4–13)
AST SERPL W P-5'-P-CCNC: 26 U/L (ref 13–39)
BASOPHILS # BLD AUTO: 0.04 THOUSANDS/ÂΜL (ref 0–0.1)
BASOPHILS NFR BLD AUTO: 1 % (ref 0–1)
BILIRUB SERPL-MCNC: 0.77 MG/DL (ref 0.2–1)
BUN SERPL-MCNC: 12 MG/DL (ref 5–25)
CALCIUM SERPL-MCNC: 9.5 MG/DL (ref 8.4–10.2)
CHLORIDE SERPL-SCNC: 100 MMOL/L (ref 96–108)
CO2 SERPL-SCNC: 27 MMOL/L (ref 21–32)
CREAT SERPL-MCNC: 0.85 MG/DL (ref 0.6–1.3)
EOSINOPHIL # BLD AUTO: 0.1 THOUSAND/ÂΜL (ref 0–0.61)
EOSINOPHIL NFR BLD AUTO: 1 % (ref 0–6)
ERYTHROCYTE [DISTWIDTH] IN BLOOD BY AUTOMATED COUNT: 12 % (ref 11.6–15.1)
GFR SERPL CREATININE-BSD FRML MDRD: 82 ML/MIN/1.73SQ M
GLUCOSE SERPL-MCNC: 95 MG/DL (ref 65–140)
HCT VFR BLD AUTO: 47.7 % (ref 34.8–46.1)
HGB BLD-MCNC: 15.5 G/DL (ref 11.5–15.4)
IMM GRANULOCYTES # BLD AUTO: 0.02 THOUSAND/UL (ref 0–0.2)
IMM GRANULOCYTES NFR BLD AUTO: 0 % (ref 0–2)
LYMPHOCYTES # BLD AUTO: 1.64 THOUSANDS/ÂΜL (ref 0.6–4.47)
LYMPHOCYTES NFR BLD AUTO: 21 % (ref 14–44)
MCH RBC QN AUTO: 30.3 PG (ref 26.8–34.3)
MCHC RBC AUTO-ENTMCNC: 32.5 G/DL (ref 31.4–37.4)
MCV RBC AUTO: 93 FL (ref 82–98)
MONOCYTES # BLD AUTO: 0.35 THOUSAND/ÂΜL (ref 0.17–1.22)
MONOCYTES NFR BLD AUTO: 5 % (ref 4–12)
NEUTROPHILS # BLD AUTO: 5.69 THOUSANDS/ÂΜL (ref 1.85–7.62)
NEUTS SEG NFR BLD AUTO: 72 % (ref 43–75)
NRBC BLD AUTO-RTO: 0 /100 WBCS
PLATELET # BLD AUTO: 251 THOUSANDS/UL (ref 149–390)
PMV BLD AUTO: 8.9 FL (ref 8.9–12.7)
POTASSIUM SERPL-SCNC: 3.9 MMOL/L (ref 3.5–5.3)
PROT SERPL-MCNC: 7.5 G/DL (ref 6.4–8.4)
RBC # BLD AUTO: 5.11 MILLION/UL (ref 3.81–5.12)
SODIUM SERPL-SCNC: 137 MMOL/L (ref 135–147)
WBC # BLD AUTO: 7.84 THOUSAND/UL (ref 4.31–10.16)

## 2022-11-15 RX ADMIN — IOHEXOL 85 ML: 350 INJECTION, SOLUTION INTRAVENOUS at 13:28

## 2022-11-15 NOTE — DISCHARGE INSTRUCTIONS
Please keep follow up appointments with your eye doctor as scheduled  Please return to the ED if you develop sharp pain in your right eye or have total or worsening vision loss in your right eye (peripheral vision changes)

## 2022-11-15 NOTE — ED PROVIDER NOTES
History  Chief Complaint   Patient presents with   • Eye Problem     Pt was seen at her eye dr and was told that she did not have good blood flow to her R eye and her Retina was thinning  Was sent to make sure the blood flow issue was not affecting the rest of her body  Pt had surgery on eye  for a hole in her retina      Huma Escalante is a 81-year-old female who presents to the emergency department today at the request of her eye doctor for evaluation of possible blood clot of the brain/orbital vasculature  She states she had a previous eye surgery for a hole in her retina on  of this year, was following up with the eye doctor today and was told she does not have good blood flow to her right eye and her right retina was thinning  At the time immediately following her surgery in her right eye, she experienced central vision loss and was told she had developed a grade 4 right cataract  Family history is significant for history of possible retinal detachment versus glaucoma versus unknown other eye condition in her grandmother, who is   She denies any recent changes in vision (excluding the vision loss immediately after surgery), headache, chest pain, SOB, abd pain, N/V/D/C, dysuria, hematuria, changes in urinary frequency, dizziness, lightheadedness, or other concerns at this time  She does have a history of gastric bypass surgery and states she consumes very few calories daily  She does take docusate regularly to maintain her bowel regimen  Recent lab work in  included a lipid panel which was unremarkable  History provided by:  Patient   used: No        Prior to Admission Medications   Prescriptions Last Dose Informant Patient Reported? Taking?    Docusate Calcium (STOOL SOFTENER PO)  Self Yes Yes   Sig: Take by mouth   ENBREL SURECLICK 50 MG/ML injection  Self Yes Yes   Daisy 0 25-35 MG-MCG per tablet  Self No Yes   Sig: TAKE 1 TABLET BY MOUTH EVERY DAY   Multiple Vitamin (MULTI-VITAMIN DAILY) TABS Not Taking at Unknown time  Yes No   Sig: Take by mouth   Patient not taking: No sig reported   PEDIATRIC MULTIVITAMINS-IRON PO Not Taking at Unknown time  Yes No   Patient not taking: No sig reported   VITAMIN D PO  Self Yes Yes   Sig: Take by mouth   albuterol (PROVENTIL HFA,VENTOLIN HFA) 90 mcg/act inhaler  Self Yes Yes   Sig: Inhale 2 puffs every 6 (six) hours as needed   calcium citrate (CALCITRATE) 950 (200 Ca) MG tablet  Self Yes Yes   Sig: Take 1 tablet by mouth daily   cyanocobalamin (VITAMIN B-12) 100 mcg tablet  Self Yes Yes   ferrous sulfate 325 (65 FE) MG EC tablet Not Taking at Unknown time  Yes No   Sig: Take 325 mg by mouth 3 (three) times a day with meals   Patient not taking: No sig reported      Facility-Administered Medications: None       Past Medical History:   Diagnosis Date   • Arthritis    • Asthma    • GERD (gastroesophageal reflux disease)    • Obesity        Past Surgical History:   Procedure Laterality Date   • GASTRIC BYPASS         Family History   Problem Relation Age of Onset   • Breast cancer Mother 58   • Heart disease Father    • No Known Problems Sister    • No Known Problems Daughter    • No Known Problems Maternal Grandmother    • Diabetes Maternal Grandfather    • No Known Problems Paternal Grandmother    • No Known Problems Paternal Grandfather    • No Known Problems Brother    • No Known Problems Son    • No Known Problems Maternal Uncle    • No Known Problems Paternal Aunt      I have reviewed and agree with the history as documented      E-Cigarette/Vaping   • E-Cigarette Use Never User      E-Cigarette/Vaping Substances   • Nicotine No    • THC No    • CBD No    • Flavoring No    • Other No      Social History     Tobacco Use   • Smoking status: Never Smoker   • Smokeless tobacco: Never Used   Vaping Use   • Vaping Use: Never used   Substance Use Topics   • Alcohol use: Yes     Comment: socially   • Drug use: No        Review of Systems Constitutional: Negative for chills, diaphoresis, fatigue and fever  HENT: Negative for congestion, facial swelling, rhinorrhea, sneezing and sore throat  Eyes: Positive for pain (mild, intermittent, with pressure)  Negative for photophobia and redness  Central vision loss of the right eye which has been present for nearly 2 months without significant change  She states her right pupil has been dilated for the same duration (since her surgery)  Respiratory: Negative for cough and shortness of breath  Cardiovascular: Negative for chest pain and leg swelling  Gastrointestinal: Negative for blood in stool, constipation, diarrhea, nausea and vomiting  Genitourinary: Negative for dysuria and hematuria  Musculoskeletal: Negative for back pain and neck pain  Skin: Negative for color change and rash  Allergic/Immunologic: Positive for food allergies (questionable mild dairy allergy)  Negative for environmental allergies  Neurological: Negative for dizziness, seizures, light-headedness and headaches  All other systems reviewed and are negative  Physical Exam  ED Triage Vitals   Temperature Pulse Respirations Blood Pressure SpO2   11/15/22 1120 11/15/22 1120 11/15/22 1120 11/15/22 1120 11/15/22 1120   98 4 °F (36 9 °C) 86 16 157/76 96 %      Temp src Heart Rate Source Patient Position - Orthostatic VS BP Location FiO2 (%)   -- 11/15/22 1400 11/15/22 1400 11/15/22 1400 --    Monitor Lying Right arm       Pain Score       11/15/22 1400       No Pain             Orthostatic Vital Signs  Vitals:    11/15/22 1120 11/15/22 1400   BP: 157/76 122/61   Pulse: 86 (!) 54   Patient Position - Orthostatic VS:  Lying       Physical Exam  Vitals reviewed  Constitutional:       General: She is not in acute distress  Appearance: She is not ill-appearing, toxic-appearing or diaphoretic  HENT:      Head: Normocephalic and atraumatic        Right Ear: External ear normal       Left Ear: External ear normal       Nose: Nose normal       Mouth/Throat:      Mouth: Mucous membranes are moist       Pharynx: Oropharynx is clear  Eyes:      General: Gaze aligned appropriately  No scleral icterus  Right eye: No discharge  Left eye: No discharge  Extraocular Movements: Extraocular movements intact  Conjunctiva/sclera: Conjunctivae normal       Comments: Left pupil is normal in appearance, round, reactive to light  Right pupil is fixed and dilated to within approximately 2 mm of the border of the iris, does not react to light  Patient with fully intact vision in left eye, right eye with significant central visual field deficits  She is able to see only in the extreme periphery in the superior, inferior, or lateral direction  Cardiovascular:      Rate and Rhythm: Normal rate and regular rhythm  Pulses: Normal pulses  Heart sounds: Normal heart sounds  Pulmonary:      Effort: Pulmonary effort is normal  No respiratory distress  Breath sounds: Normal breath sounds  No stridor  No wheezing, rhonchi or rales  Abdominal:      General: Bowel sounds are normal  There is no distension  Palpations: Abdomen is soft  Tenderness: There is no abdominal tenderness  There is no guarding  Musculoskeletal:         General: No tenderness  Normal range of motion  Cervical back: Normal range of motion  Right lower leg: No edema  Left lower leg: No edema  Skin:     General: Skin is warm and dry  Coloration: Skin is not jaundiced  Findings: No rash  Neurological:      Mental Status: She is alert and oriented to person, place, and time  Mental status is at baseline     Psychiatric:         Mood and Affect: Mood normal          Behavior: Behavior normal          ED Medications  Medications   iohexol (OMNIPAQUE) 350 MG/ML injection (SINGLE-DOSE) 85 mL (85 mL Intravenous Given 11/15/22 1328)       Diagnostic Studies  Results Reviewed     Procedure Component Value Units Date/Time    Comprehensive metabolic panel [[id number]] Collected: 11/15/22 1228    Lab Status: Final result Specimen: Blood from Arm, Left Updated: 11/15/22 1255     Sodium 137 mmol/L      Potassium 3 9 mmol/L      Chloride 100 mmol/L      CO2 27 mmol/L      ANION GAP 10 mmol/L      BUN 12 mg/dL      Creatinine 0 85 mg/dL      Glucose 95 mg/dL      Calcium 9 5 mg/dL      AST 26 U/L      ALT 26 U/L      Alkaline Phosphatase 39 U/L      Total Protein 7 5 g/dL      Albumin 4 3 g/dL      Total Bilirubin 0 77 mg/dL      eGFR 82 ml/min/1 73sq m     Narrative:      National Kidney Disease Foundation guidelines for Chronic Kidney Disease (CKD):   •  Stage 1 with normal or high GFR (GFR > 90 mL/min/1 73 square meters)  •  Stage 2 Mild CKD (GFR = 60-89 mL/min/1 73 square meters)  •  Stage 3A Moderate CKD (GFR = 45-59 mL/min/1 73 square meters)  •  Stage 3B Moderate CKD (GFR = 30-44 mL/min/1 73 square meters)  •  Stage 4 Severe CKD (GFR = 15-29 mL/min/1 73 square meters)  •  Stage 5 End Stage CKD (GFR <15 mL/min/1 73 square meters)  Note: GFR calculation is accurate only with a steady state creatinine    CBC and differential [[id number]]  (Abnormal) Collected: 11/15/22 1228    Lab Status: Final result Specimen: Blood from Arm, Left Updated: 11/15/22 1233     WBC 7 84 Thousand/uL      RBC 5 11 Million/uL      Hemoglobin 15 5 g/dL      Hematocrit 47 7 %      MCV 93 fL      MCH 30 3 pg      MCHC 32 5 g/dL      RDW 12 0 %      MPV 8 9 fL      Platelets 053 Thousands/uL      nRBC 0 /100 WBCs      Neutrophils Relative 72 %      Immat GRANS % 0 %      Lymphocytes Relative 21 %      Monocytes Relative 5 %      Eosinophils Relative 1 %      Basophils Relative 1 %      Neutrophils Absolute 5 69 Thousands/µL      Immature Grans Absolute 0 02 Thousand/uL      Lymphocytes Absolute 1 64 Thousands/µL      Monocytes Absolute 0 35 Thousand/µL      Eosinophils Absolute 0 10 Thousand/µL      Basophils Absolute 0 04 Thousands/µL                  CTA head with and without contrast   Final Result by Alf Huggins MD (11/15 6904)      No acute pathology  No intracranial stenosis, large vessel occlusion or aneurysm  Workstation performed: [id number]               Procedures  Procedures      ED Course  ED Course as of 11/15/22 1501   Tue Nov 15, 2022   1155 Patient states she was sent here from eye dr's office for evaluation of possible blood clot causing decreased blood flow to R eye  PMHx is significant for hole in R retina requiring surgical repair on 9/21/22, post-surgery she had significant visual field deficit of the right eye and was told she had developed a stage 4 cataract and would need additional surgery for this  She states her right pupil has been dilated since the time of the surgery  Grandmother with history of retinal detachment vs glaucoma vs other unknown eye issues, patient unsure of exact history  Given that patient was sent here for imaging to rule out blood clot in brain potentially affecting retinal blood flow, have ordered CTA head w and w/o contrast, CMP, CBC    1246 Hemoglobin(!): 15 5   1247 WBC: 7 84   1247 Platelet Count: 973   1247 HCT(!): 47 7   1302 CMP within normal limits   1350 CTA head with and without contrast read:     No acute pathology  No intracranial stenosis, large vessel occlusion or aneurysm  1355 Patient informed of imaging results, stable for discharge to home/self care, ok to follow up with eye doctor as needed and/or PCP for additional concerns  Return precautions given as return to ED for sudden onset of right eye pain or further decrease in right eye vision  SBIRT 22yo+    Flowsheet Row Most Recent Value   SBIRT (25 yo +)    In order to provide better care to our patients, we are screening all of our patients for alcohol and drug use  Would it be okay to ask you these screening questions?  No Filed at: 11/15/2022 9036 MDM  Number of Diagnoses or Management Options  Vision loss, right eye: established and worsening  Diagnosis management comments: Patient is a 52-year-old female who presented today at the request of her eye doctor for evaluation of possible blood clot of the brain/orbital vasculature  PMHx is notable for previous eye surgery for a hole in her retina on 9/21/22, was at the eye doctor today and told she might have a blood clot due to decreased blood flow to the right eye/retina on imaging and to come to the ED for evaluation  She does not currently have central vision in her right eye, does have peripheral vision intact; this is unchanged since her surgery  CTA head with and without contrast with no acute pathology and no intracranial stenosis, large vessel occlusion, or aneurysm  CMP within normal limits, CBC notable for mildly elevated hemoglobin and hematocrit  Patient remained hemodynamically stable in the emergency department and is cleared for discharge home/self-care, instructed to follow-up with eye doctor schedule, no further concerns at this time  Return precautions given as return to emergency department if new onset of sudden right eye pain or additional decrease in vision of the right eye         Amount and/or Complexity of Data Reviewed  Clinical lab tests: reviewed  Tests in the radiology section of CPT®: ordered and reviewed  Review and summarize past medical records: yes  Discuss the patient with other providers: yes  Independent visualization of images, tracings, or specimens: yes    Risk of Complications, Morbidity, and/or Mortality  Presenting problems: moderate  Diagnostic procedures: low  Management options: low        Disposition  Final diagnoses:   Vision loss, right eye     Time reflects when diagnosis was documented in both MDM as applicable and the Disposition within this note     Time User Action Codes Description Comment    11/15/2022  2:12 PM Evelin Landaverde [U08 60] Vision loss, right eye       ED Disposition     ED Disposition   Discharge    Condition   Stable    Date/Time   Tue Nov 15, 2022  2:11 PM    Comment   Red Hand discharge to home/self care  Follow-up Information     Follow up With Specialties Details Why Contact Info    Your eye doctor  Call  As needed           Discharge Medication List as of 11/15/2022  2:14 PM      CONTINUE these medications which have NOT CHANGED    Details   albuterol (PROVENTIL HFA,VENTOLIN HFA) 90 mcg/act inhaler Inhale 2 puffs every 6 (six) hours as needed, Starting Wed 4/8/2015, Historical Med      calcium citrate (CALCITRATE) 950 (200 Ca) MG tablet Take 1 tablet by mouth daily, Historical Med      cyanocobalamin (VITAMIN B-12) 100 mcg tablet Starting Fri 7/27/2012, Historical Med      Docusate Calcium (STOOL SOFTENER PO) Take by mouth, Historical Med      ENBREL SURECLICK 50 MG/ML injection Starting Wed 8/29/2018, Historical Med      Daisy 0 25-35 MG-MCG per tablet TAKE 1 TABLET BY MOUTH EVERY DAY, Normal      VITAMIN D PO Take by mouth, Historical Med      ferrous sulfate 325 (65 FE) MG EC tablet Take 325 mg by mouth 3 (three) times a day with meals, Historical Med      Multiple Vitamin (MULTI-VITAMIN DAILY) TABS Take by mouth, Historical Med      PEDIATRIC MULTIVITAMINS-IRON PO Starting Fri 7/27/2012, Historical Med           No discharge procedures on file  PDMP Review     None           ED Provider  Attending physically available and evaluated Edith Arriaga I managed the patient along with the ED Attending      Electronically Signed by         Fidelina Rangel DO  11/15/22 6081

## 2022-11-15 NOTE — ED ATTENDING ATTESTATION
11/15/2022  IEfren MD, saw and evaluated the patient  I have discussed the patient with the resident/non-physician practitioner and agree with the resident's/non-physician practitioner's findings, Plan of Care, and MDM as documented in the resident's/non-physician practitioner's note, except where noted  All available labs and Radiology studies were reviewed  I was present for key portions of any procedure(s) performed by the resident/non-physician practitioner and I was immediately available to provide assistance  At this point I agree with the current assessment done in the Emergency Department  I have conducted an independent evaluation of this patient a history and physical is as follows: This is a 69-year-old female with a history of retinal detachment, status post repair in September, being sent by her ophthalmologist today for paleness of her retina  Patient states that since September she has had central visual field deficit, without any significant worsening  She denies any other significantly related symptoms  Patient visited her ophthalmologist today, whom re-examined her, told her that she had a large cataract, but also told her that she had a pale retina on funduscopic exam, and was sent to the ED to rule out central retinal artery occlusion  Patient on exam does not have any significant abnormalities  Her differential diagnosis includes:  Central retinal artery versus vein occlusion versus progression of her initial injury versus cataract versus other  Patient had an ophthalmologic exam at her outpatient visit, and was sent for imaging  She underwent CTA of her head showing no evidence for central retinal artery or vein occlusion, and after re-evaluation patient was deemed stable for discharge  Patient was given strict return precautions with which she agreed to comply  She was discharged home in stable condition and felt safe going home    ED Course         Critical Care Time  Procedures

## 2022-12-02 ENCOUNTER — TELEPHONE (OUTPATIENT)
Dept: HEMATOLOGY ONCOLOGY | Facility: CLINIC | Age: 47
End: 2022-12-02

## 2022-12-02 NOTE — TELEPHONE ENCOUNTER
12/02/22    Spoke with pt was meant to reminding her Iron study and MMA test  However pt's last iron infusion was on 9/30 and her recent hgb was 15 5  Confirmed with pt and she is feeling great- no any Sx of anemia  R/S pt to see us in Jan- pt will have iron level checked 1-2 week before  Pt verbalized great understanding and appreciation

## 2022-12-02 NOTE — PROGRESS NOTES
Weight Management Medical Nutrition Assessment  Michele Ibrahim is here for 1/3 RD Bundle  Current Weight: 233 2#  She has lost 1 8# x 2 months and overall has lost 7 8#  Patient has been logging consistently (averaging 900 kcal)  Patient feels discouraged as she is consuming lower calories and not seeing loss  Explained to patient that she may not be consuming enough calories to promote weight loss  Recommend patient set a goal of 1,200 kcal/day  Patient continues to struggle with mental component of weight loss  Patient stated she feel she may be obsessive with logging  Recommend patient d/c logging but she would like to continue  Encouraged patient to focus on logging proteins, fats, and starches, not focusing on every vegetable calorie  Patient has also been weighing herself daily  Recommend patient weigh herself 1-2x/week to prevent change in intake based on scale results  Patient had [facility] MD f/u in August which was cancelled against patient request  Patient was scheduled for new MWM f/u as she is interested in discussing medication options  Explained to patient that [facility]DEB MD will discuss options available to her and this does not necessarily assume medication will be started  Patient unable to incorporate PA as she has recently lost vision in her right eye  Patient will f/u with RD x 6 weeks and LASHONDA MCGUIRE x 1 month       Patient seen by Medical Provider in past 6 months:  yes  Requested to schedule appointment with Medical Provider: Yes    Anthropometric Measurements  Start Weight (#): 241# (9/1/22)  Current Weight (#): 233 2#  TBW % Change from start weight: 3 2%  Ideal Body Weight (#): 128# (BMI @ 25; 60")  Goal Weight (#): --    Weight Loss History  Previous weight loss attempts: Exercise  Commercial Programs (Weight Watchers, [employer], etc )  Self Created Diets (Portion Control, Healthy Food Choices, etc )  s/p Nicol-En-Y Gastric Bypass ([place]; 7836)     Food and Nutrition Related History  Wake up: 6:30 am    Food Recall  Breakfast (8 am): [employer] protein bar (200 kcal, 21 g protein) + coffee (black) Or 1 slice low-suraj bread + 2 slices bowen Or 1 egg + 1-2 egg whites   Snack: --  Lunch: apple + cheese Or thin sliced peppered turkey + aged cheddar cheese Or turkey, cheese, and hummus w/ nut thins Or lean cuisine Or leftovers  Snack (2-3 pm): popcorn Or white cheddar rice cakes Or slim elicia   Dinner: lean cuisine Or salad (vegetables + protein + feta + nuts + olive garden light dressing) Or protein/veggie (broccoli or cauliflower)/typically no carbs Or scrambled eggs w/ veggies (1 egg, 2 egg whites) + 2 slices bowen  Snack: LF popcorn Or all-natural ice pop    Beverages: water (plain or sparkling) and coffee (16 oz; black)  Volume of beverage intake: 50-70 ounces    Weekends: Worse, may have more alcohol or dining out (will always get a salad with protein)  Cravings: salt  Trouble area of day: none    Frequency of Eating out: maybe 1-2x/week  Food restrictions: avoids carbs, won't eat fish/yogurt/cottage cheese  Cooking: self  Food Shopping: self    Occupation: Consultant for pharmaceuticals (sedentary)    Physical Activity  Activity: No structured routine; more mindful of movement [Had eye surgery which caused her to lose vision therefore she has not been able to exercise since last visit]  Frequency:rarely  Physical limitations/barriers to exercise: arthritis pain, inflamed tendon    Estimated Needs  [employer] Needs (Needs at 233  2#)  BMR: 1,619 kcal  Maintenance calories (sedentary): 1,943 kcal  1-2# loss weekly sedentary: 943-1,443 kcal  1-2# loss weekly lightly active: 1,227-1,727 kcal    Protein: 70-87 grams (1 2-1 5g/kg IBW)  Fluid: 68 ounces (35mL/kg IBW)    Nutrition Diagnosis  Yes;     Overweight/obesity related to Excess energy intake as evidenced by BMI more than normative standard for age and sex (obesity-grade III 36+)     Nutrition Intervention    Nutrition Prescription  Calories: 1,100-1,300 kcal Protein:  g  Fluid: 68+ ounces    Meal Plan (Matt/Pro)  Breakfast: 200-300/20-30  Snack: --  Lunch: 238/25-12  Snack: 100-150/5+  Dinner: 860/53-43  Snack: 100-150/5+    Nutrition Education  Calorie controlled menu  Lean protein food choices  Healthy snack options  Food journaling tips    Nutrition Counseling  Strategies: meal planning, portion sizes, healthy snack choices, hydration, fiber intake, protein intake, exercise, food logging    Monitoring and Evaluation:    Evaluation criteria  Energy Intake  Meet protein needs  Maintain adequate hydration  Monitor weekly weight  Meal planning/preparation  Food journal   Decreased portions at mealtimes and snacks  Physical activity     Barriers to learning:none  Readiness to change: Action:  (Changing behavior)  Comprehension: very good  Expected Compliance: good

## 2022-12-05 ENCOUNTER — OFFICE VISIT (OUTPATIENT)
Dept: BARIATRICS | Facility: CLINIC | Age: 47
End: 2022-12-05

## 2022-12-05 VITALS — HEIGHT: 60 IN | WEIGHT: 233.2 LBS | BODY MASS INDEX: 45.78 KG/M2

## 2022-12-05 DIAGNOSIS — R63.5 ABNORMAL WEIGHT GAIN: Primary | ICD-10-CM

## 2022-12-08 ENCOUNTER — CATARACT CONSULTATION (OUTPATIENT)
Dept: URBAN - METROPOLITAN AREA CLINIC 6 | Facility: CLINIC | Age: 47
End: 2022-12-08

## 2022-12-08 DIAGNOSIS — H34.11: ICD-10-CM

## 2022-12-08 DIAGNOSIS — Z98.890: ICD-10-CM

## 2022-12-08 DIAGNOSIS — H25.12: ICD-10-CM

## 2022-12-08 DIAGNOSIS — H25.041: ICD-10-CM

## 2022-12-08 PROCEDURE — 99214 OFFICE O/P EST MOD 30 MIN: CPT

## 2022-12-08 PROCEDURE — 92136 OPHTHALMIC BIOMETRY: CPT

## 2022-12-08 ASSESSMENT — VISUAL ACUITY: OS_CC: 20/20

## 2022-12-08 ASSESSMENT — TONOMETRY
OD_IOP_MMHG: 12
OS_IOP_MMHG: 13

## 2023-01-05 ENCOUNTER — TELEPHONE (OUTPATIENT)
Dept: BARIATRICS | Facility: CLINIC | Age: 48
End: 2023-01-05

## 2023-01-05 ENCOUNTER — OFFICE VISIT (OUTPATIENT)
Dept: BARIATRICS | Facility: CLINIC | Age: 48
End: 2023-01-05

## 2023-01-05 VITALS
HEART RATE: 82 BPM | HEIGHT: 60 IN | SYSTOLIC BLOOD PRESSURE: 120 MMHG | DIASTOLIC BLOOD PRESSURE: 78 MMHG | BODY MASS INDEX: 45.96 KG/M2 | RESPIRATION RATE: 16 BRPM | WEIGHT: 234.1 LBS

## 2023-01-05 DIAGNOSIS — K91.2 POSTSURGICAL MALABSORPTION: Primary | ICD-10-CM

## 2023-01-05 DIAGNOSIS — E66.01 MORBID OBESITY (HCC): ICD-10-CM

## 2023-01-05 DIAGNOSIS — Z91.89 AT RISK FOR SLEEP APNEA: ICD-10-CM

## 2023-01-05 NOTE — PROGRESS NOTES
Assessment/Plan:  Juan Luis Leon was seen today for follow-up  Diagnoses and all orders for this visit:    Postsurgical malabsorption  -     TSH w/Reflex; Future    At risk for sleep apnea  -     Ambulatory referral to Sleep Medicine; Future    Morbid obesity ([facility] [address] )  -     TSH w/Reflex; Future  -     Ambulatory referral to Sleep Medicine; Future  -     Semaglutide-Weight Management (WEGOVY) 0 25 MG/0 5ML; Inject 0 5 mL (0 25 mg total) under the skin once a week    BMI 45 0-49 9, [facility])  -     Ambulatory referral to Sleep Medicine; Future  -     Semaglutide-Weight Management (WEGOVY) 0 25 MG/0 5ML; Inject 0 5 mL (0 25 mg total) under the skin once a week         Weight not at goal  Nutrition prescription:  Calorie goal: 1200kcal  Encourage mindful eating, portion control, motivational interview performed to help patient reach goals   Patient denies personal and family history of  pancreatitis, thyroid cancer, MEN-2 tumors  Denies any hx of glaucoma, seizures, kidney stones, gallstones  Denies Hx of CAD, PAD, palpitations, arrhythmia  Denies uncontrolled anxiety or depression, suicidal ideation or behavior, insomnia or sleep disturbance  Follow up in approximately  6 weeks      Subjective:   Chief Complaint   Patient presents with   • Follow-up     Patient here to discuss weight associated problems and nutrition goals  HPI: Edna Ryan  is a 56 y o  female with excess weight/obesity here to pursue weight management  Patient is pursuing Conservative Program    Most recent notes and records were reviewed    Initial weight loss goal of 5-10% weight loss for improved health  Wt Readings from Last 10 Encounters:   01/05/23 106 kg (234 lb 1 6 oz)   12/05/22 106 kg (233 lb 3 2 oz)   10/06/22 107 kg (235 lb)   09/07/22 110 kg (242 lb)   09/01/22 109 kg (241 lb)   08/23/22 111 kg (244 lb 12 8 oz)   08/16/22 111 kg (244 lb)   08/04/22 111 kg (244 lb)   06/30/22 109 kg (239 lb 3 2 oz)   06/23/22 110 kg (242 lb)     Hx of gastric bypass in 2005 lost 135lbs, kept it off long time ,   Weight before surgery 332lbs   Initial weight:in weight management 244lbs  Last OV weight: 235lbs  Current weight:234lbs  Change in weight: -10lbs      She goes through perimenopause, gained about 30lbs this year   Food logging: yes 1100kcal sometimes 1300kcal  Increased appetite/cravings:no  Sees dietician   Exercise: restarted walking , had eye surgery for cataract  Hydration: 90oz water  Alcohol: rare twice a mo  Sleep: ok        The following portions of the patient's history were reviewed and updated as appropriate: allergies, current medications, past family history, past medical history, past social history, past surgical history, and problem list       Review of Systems   Constitutional: Negative for activity change  Fatigue  HENT: Negative for trouble swallowing  Respiratory: Negative for shortness of breath  Cardiovascular: Negative for chest pain, edema  Gastrointestinal: Negative for abdominal pain, nausea and vomiting, acid reflux, constipation/diarrhea  Psychiatric/Behavioral: Negative for behavioral problems , anxiety or depression    Objective:  Resp 16   Ht 5' (1 524 m)   Wt 106 kg (234 lb 1 6 oz)   BMI 45 72 kg/m²   Constitutional: Well-developed, well-nourished and Obese Body mass index is 45 72 kg/m²  [place] Simple HEENT: No conjunctival injection  No thyroid masses  Pulmonary: No increased work of breathing or signs of respiratory distress  Clear respiratory sounds  CV: Well-perfused, Regular rate and rhythm, no murmurs, no edema  GI: increased abdominal girth  Non-distended  Not tender   Neuro: Oriented to person, place and time  Normal Speech  Normal gait  Psych: Normal affect and mood  No delusion or hallucinations, normal thought process    Labs and Imaging  Recent labs and imaging have been personally reviewed    Lab Results   Component Value Date    WBC 7 84 11/15/2022    HGB 15 5 (H) 11/15/2022    HCT 47 7 (H) 11/15/2022    MCV 93 11/15/2022     11/15/2022     Lab Results   Component Value Date     02/26/2015    SODIUM 137 11/15/2022    K 3 9 11/15/2022     11/15/2022    CO2 27 11/15/2022    ANIONGAP 6 02/26/2015    AGAP 10 11/15/2022    BUN 12 11/15/2022    CREATININE 0 85 11/15/2022    GLUC 95 11/15/2022    GLUF 82 06/30/2022    CALCIUM 9 5 11/15/2022    AST 26 11/15/2022    ALT 26 11/15/2022    ALKPHOS 39 11/15/2022    PROT 7 5 02/26/2015    TP 7 5 11/15/2022    BILITOT 0 4 02/26/2015    TBILI 0 77 11/15/2022    EGFR 82 11/15/2022     Lab Results   Component Value Date    HGBA1C 5 1 06/30/2022     No results found for: [id number], TSH  Lab Results   Component Value Date    CHOLESTEROL 161 06/30/2022     Lab Results   Component Value Date    HDL 75 06/30/2022     Lab Results   Component Value Date    TRIG 89 06/30/2022     Lab Results   Component Value Date    LDLCALC 68 06/30/2022

## 2023-01-12 NOTE — TELEPHONE ENCOUNTER
Prior authorization for [facility] approved  Went through on insurance yesterday for patient, she picked it up    Copay $24

## 2023-01-19 ENCOUNTER — TELEPHONE (OUTPATIENT)
Dept: HEMATOLOGY ONCOLOGY | Facility: CLINIC | Age: 48
End: 2023-01-19

## 2023-01-19 NOTE — TELEPHONE ENCOUNTER
1/19/2023    Lab Results   Component Value Date    WBC 7 84 11/15/2022    HGB 15 5 (H) 11/15/2022    HCT 47 7 (H) 11/15/2022    MCV 93 11/15/2022     11/15/2022     Anemia follow-up  History of gastric bypass surgery  Last iron infusion was on 9/30/2022  Hemoglobin in November 2022 was above 15  Spoke with patient today explaining even her hemoglobin was good last time, I still would like to repeat her iron study and MMA  Pt verbalized great understanding and will have blood work done tomorrow

## 2023-01-23 ENCOUNTER — APPOINTMENT (OUTPATIENT)
Dept: LAB | Facility: MEDICAL CENTER | Age: 48
End: 2023-01-23

## 2023-01-23 DIAGNOSIS — E55.9 VITAMIN D INSUFFICIENCY: ICD-10-CM

## 2023-01-23 DIAGNOSIS — K91.2 POSTSURGICAL MALABSORPTION: ICD-10-CM

## 2023-01-23 DIAGNOSIS — R79.0 LOW FERRITIN: ICD-10-CM

## 2023-01-23 DIAGNOSIS — E66.01 MORBID OBESITY (HCC): ICD-10-CM

## 2023-01-23 LAB
25(OH)D3 SERPL-MCNC: 31.9 NG/ML (ref 30–100)
ALBUMIN SERPL BCP-MCNC: 3.5 G/DL (ref 3.5–5)
ALP SERPL-CCNC: 37 U/L (ref 46–116)
ALT SERPL W P-5'-P-CCNC: 38 U/L (ref 12–78)
ANION GAP SERPL CALCULATED.3IONS-SCNC: 5 MMOL/L (ref 4–13)
AST SERPL W P-5'-P-CCNC: 23 U/L (ref 5–45)
BASOPHILS # BLD AUTO: 0.05 THOUSANDS/ÂΜL (ref 0–0.1)
BASOPHILS NFR BLD AUTO: 1 % (ref 0–1)
BILIRUB SERPL-MCNC: 0.61 MG/DL (ref 0.2–1)
BUN SERPL-MCNC: 11 MG/DL (ref 5–25)
CALCIUM SERPL-MCNC: 8.6 MG/DL (ref 8.3–10.1)
CHLORIDE SERPL-SCNC: 108 MMOL/L (ref 96–108)
CO2 SERPL-SCNC: 26 MMOL/L (ref 21–32)
CREAT SERPL-MCNC: 0.75 MG/DL (ref 0.6–1.3)
EOSINOPHIL # BLD AUTO: 0.22 THOUSAND/ÂΜL (ref 0–0.61)
EOSINOPHIL NFR BLD AUTO: 4 % (ref 0–6)
ERYTHROCYTE [DISTWIDTH] IN BLOOD BY AUTOMATED COUNT: 12.7 % (ref 11.6–15.1)
FERRITIN SERPL-MCNC: 96 NG/ML (ref 8–388)
GFR SERPL CREATININE-BSD FRML MDRD: 95 ML/MIN/1.73SQ M
GLUCOSE P FAST SERPL-MCNC: 80 MG/DL (ref 65–99)
HCT VFR BLD AUTO: 42.6 % (ref 34.8–46.1)
HGB BLD-MCNC: 13 G/DL (ref 11.5–15.4)
IMM GRANULOCYTES # BLD AUTO: 0.01 THOUSAND/UL (ref 0–0.2)
IMM GRANULOCYTES NFR BLD AUTO: 0 % (ref 0–2)
IRON SATN MFR SERPL: 33 % (ref 15–50)
IRON SERPL-MCNC: 99 UG/DL (ref 50–170)
LYMPHOCYTES # BLD AUTO: 1.5 THOUSANDS/ÂΜL (ref 0.6–4.47)
LYMPHOCYTES NFR BLD AUTO: 28 % (ref 14–44)
MCH RBC QN AUTO: 30.2 PG (ref 26.8–34.3)
MCHC RBC AUTO-ENTMCNC: 30.5 G/DL (ref 31.4–37.4)
MCV RBC AUTO: 99 FL (ref 82–98)
MONOCYTES # BLD AUTO: 0.44 THOUSAND/ÂΜL (ref 0.17–1.22)
MONOCYTES NFR BLD AUTO: 8 % (ref 4–12)
NEUTROPHILS # BLD AUTO: 3.24 THOUSANDS/ÂΜL (ref 1.85–7.62)
NEUTS SEG NFR BLD AUTO: 59 % (ref 43–75)
NRBC BLD AUTO-RTO: 0 /100 WBCS
PLATELET # BLD AUTO: 180 THOUSANDS/UL (ref 149–390)
PMV BLD AUTO: 13.5 FL (ref 8.9–12.7)
POTASSIUM SERPL-SCNC: 4.1 MMOL/L (ref 3.5–5.3)
PROT SERPL-MCNC: 6.8 G/DL (ref 6.4–8.4)
RBC # BLD AUTO: 4.3 MILLION/UL (ref 3.81–5.12)
SODIUM SERPL-SCNC: 139 MMOL/L (ref 135–147)
TIBC SERPL-MCNC: 300 UG/DL (ref 250–450)
TSH SERPL DL<=0.05 MIU/L-ACNC: 2.91 UIU/ML (ref 0.45–4.5)
WBC # BLD AUTO: 5.46 THOUSAND/UL (ref 4.31–10.16)

## 2023-01-24 ENCOUNTER — OFFICE VISIT (OUTPATIENT)
Dept: HEMATOLOGY ONCOLOGY | Facility: CLINIC | Age: 48
End: 2023-01-24

## 2023-01-24 VITALS
RESPIRATION RATE: 14 BRPM | HEART RATE: 55 BPM | HEIGHT: 60 IN | DIASTOLIC BLOOD PRESSURE: 76 MMHG | BODY MASS INDEX: 47.12 KG/M2 | SYSTOLIC BLOOD PRESSURE: 120 MMHG | OXYGEN SATURATION: 99 % | WEIGHT: 240 LBS | TEMPERATURE: 97.6 F

## 2023-01-24 DIAGNOSIS — R79.0 LOW FERRITIN: Primary | ICD-10-CM

## 2023-01-24 DIAGNOSIS — K91.2 POSTSURGICAL MALABSORPTION: ICD-10-CM

## 2023-01-24 NOTE — PROGRESS NOTES
Weight Management Medical Nutrition Assessment  Ricco Lee is here for 2/3 RD Bundle  Current Weight: 237#  She has gained 3 8# x 7 weeks and overall has lost 4#  Patient has recently started [facility]  She had slight gas pain which was relieved with GasX  Patient is currently logging, averaging 1,100-1,300 kcal but on occasion, may log below 1,000 kcal  Discussed importance of consuming adequate calories while on medication as this may reduce her appetite even further  Patient has been meal planning/prepping which she feels has been helpful  Patient continues to struggle mentally with incorporating carbohydrates but is actively working to increase her mental acceptance  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  Will f/u with LASHONDA MCGUIRE x 2 weeks and RD x 7 weeks       Patient seen by Medical Provider in past 6 months:  yes  Requested to schedule appointment with Medical Provider: Yes    Anthropometric Measurements  Start Weight (#): 241# (9/1/22)  Current Weight (#): 237#  TBW % Change from start weight: 1 7%  Ideal Body Weight (#): 128# (BMI @ 25; 60")  Goal Weight (#): --    Weight Loss History  Previous weight loss attempts: Exercise  Commercial Programs (Weight Watchers, [employer]s, etc )  Self Created Diets (Portion Control, Healthy Food Choices, etc )  s/p Nicol-En-Y Gastric Bypass ([place]; 8757)     Food and Nutrition Related History  Wake up: 6:30 am    Food Recall  Breakfast (8 am): Quest protein bar (200 kcal, 21 g protein) + coffee (black) Or 1 egg + 2 egg whites + 647 toast    Snack: --  Lunch 912 pm): chicken breast w/ salad + feta + balsamic dressing Or thin sliced peppered turkey/turkey breast + aged cheddar + non-starchy veg + may use 647 bread or none Or lean cuisine  Snack (2-3 pm): LF popcorn Or ranch rice cakes Or hummus + veggies (peppers or snap peas) or nut thins  Dinner: chicken/beef with veggies or low-carb taco Or lean meatloaf muffins Or lean cuisine Or scrambled eggs w/ veggies (1 egg, 2 egg whites) + 2 slices bowen  Snack: peppermint bark Or LF popcorn     Beverages: water (plain or sparkling) and coffee (16 oz black)  Volume of beverage intake: 50-70 ounces    Weekends: Worse, may have more alcohol or dining out (will always get a salad with protein)  Cravings: salt  Trouble area of day: none    Frequency of Eating out: maybe 1-2x/week  Food restrictions: avoids carbs, won't eat fish/yogurt/cottage cheese  Cooking: self  Food Shopping: self    Occupation: Consultant for pharmaceuticals (sedentary)    Physical Activity  Activity: No structured routine; more mindful of movement  Frequency:rarely  Physical limitations/barriers to exercise: arthritis pain, inflamed tendon    Estimated Needs  [employer] Needs (Needs at 233  2#)  BMR: 1,619 kcal  Maintenance calories (sedentary): 1,943 kcal  1-2# loss weekly sedentary: 943-1,443 kcal  1-2# loss weekly lightly active: 1,227-1,727 kcal    Protein: 70-87 grams (1 2-1 5g/kg IBW)  Fluid: 68 ounces (35mL/kg IBW)    Nutrition Diagnosis  Yes;     Overweight/obesity related to Excess energy intake as evidenced by BMI more than normative standard for age and sex (obesity-grade III 36+)     Nutrition Intervention    Nutrition Prescription  Calories: 1,100-1,300 kcal   Protein:  g  Fluid: 68+ ounces    Meal Plan (Matt/Pro)  Breakfast: 200-300/20-30  Snack: --  Lunch: 400/20-30  Snack: 100-150/5+  Dinner: 400/20-30  Snack: 100-150/5+    Nutrition Education  Calorie controlled menu  Lean protein food choices  Healthy snack options  Food journaling tips    Nutrition Counseling  Strategies: meal planning, portion sizes, healthy snack choices, hydration, fiber intake, protein intake, exercise, food logging    Monitoring and Evaluation:    Evaluation criteria  Energy Intake  Meet protein needs  Maintain adequate hydration  Monitor weekly weight  Meal planning/preparation  Food journal   Decreased portions at mealtimes and snacks  Physical activity Barriers to learning:none  Readiness to change: Action:  (Changing behavior)  Comprehension: very good  Expected Compliance: good

## 2023-01-25 ENCOUNTER — OFFICE VISIT (OUTPATIENT)
Dept: BARIATRICS | Facility: CLINIC | Age: 48
End: 2023-01-25

## 2023-01-25 VITALS — WEIGHT: 237 LBS | HEIGHT: 60 IN | BODY MASS INDEX: 46.53 KG/M2

## 2023-01-25 DIAGNOSIS — R63.5 ABNORMAL WEIGHT GAIN: Primary | ICD-10-CM

## 2023-01-26 LAB — METHYLMALONATE SERPL-SCNC: 104 NMOL/L (ref 0–378)

## 2023-02-06 ENCOUNTER — OFFICE VISIT (OUTPATIENT)
Dept: BARIATRICS | Facility: CLINIC | Age: 48
End: 2023-02-06

## 2023-02-06 VITALS
DIASTOLIC BLOOD PRESSURE: 80 MMHG | SYSTOLIC BLOOD PRESSURE: 124 MMHG | RESPIRATION RATE: 16 BRPM | HEART RATE: 60 BPM | HEIGHT: 60 IN | BODY MASS INDEX: 46.51 KG/M2 | WEIGHT: 236.9 LBS

## 2023-02-06 DIAGNOSIS — Z98.84 HX OF GASTRIC BYPASS: ICD-10-CM

## 2023-02-06 DIAGNOSIS — E66.01 MORBID OBESITY (HCC): Primary | ICD-10-CM

## 2023-02-06 NOTE — PROGRESS NOTES
Assessment/Plan:  [facility] was seen today for follow-up  Diagnoses and all orders for this visit:      Morbid obesity ([address] )  -blood work reviewed normal thyroid fc  Increase Wegovy to 0 5mg weekly, plan is for her to eat more calories  If cannot while on Wegovy will have to stop the medication  1400kcal daily  Plan to check metabolic testing  Return in 2mo  BMI 45 0-49 9, adult [facility])  -     Ambulatory referral to Sleep Medicine; Future  -     Increase Wegovy     Component Ref Range & Units 1/23/23  7:54 AM    TSH 3RD GENERATON 0 450 - 4 500 uIU/mL 2 910      Component Ref Range & Units 1/23/23  7:54 AM 6/30/22  9:03 AM   Vit D, 25-Hydroxy 30 0 - 100 0 ng/mL 31 9  27 6 Low      Component Ref Range & Units 1/23/23  7:54 AM 6/30/22  9:03 AM   Iron Saturation 15 - 50 % 33  13 Low     TIBC 250 - 450 ug/dL 300  947 High     Iron 50 - 170 ug/dL 99  127 CM          Weight not at goal  Nutrition prescription:  Calorie goal: 1400kcal  Encourage mindful eating, portion control, motivational interview performed to help patient reach goals   Patient denies personal and family history of  pancreatitis, thyroid cancer, MEN-2 tumors  Denies any hx of glaucoma, seizures, kidney stones, gallstones  Denies Hx of CAD, PAD, palpitations, arrhythmia  Denies uncontrolled anxiety or depression, suicidal ideation or behavior, insomnia or sleep disturbance  Follow up in approximately  6 weeks      Subjective:   Chief Complaint   Patient presents with   • Follow-up     Patient here to discuss weight associated problems and nutrition goals  HPI: Roopa Romo  is a 56 y o  female with excess weight/obesity here to pursue weight management  Patient is pursuing Conservative Program    Most recent notes and records were reviewed    Initial weight loss goal of 5-10% weight loss for improved health  Wt Readings from Last 10 Encounters:   02/06/23 107 kg (236 lb 14 4 oz)   01/25/23 108 kg (237 lb)   01/24/23 109 kg (240 lb)   01/05/23 106 kg (234 lb 1 6 oz)   12/05/22 106 kg (233 lb 3 2 oz)   10/06/22 107 kg (235 lb)   09/07/22 110 kg (242 lb)   09/01/22 109 kg (241 lb)   08/23/22 111 kg (244 lb 12 8 oz)   08/16/22 111 kg (244 lb)     Hx of gastric bypass in 2005 lost 135lbs, kept it off long time ,   Weight before surgery 332lbs   Initial weight:in weight management 244lbs  Last OV weight: 234lbs  Current weight:236lbs  Change in weight: +2lbslbs    [employer] Needs (Needs at 233  2#)  BMR: 1,619 kcal  Maintenance calories (sedentary): 1,943 kcal  She goes through perimenopause, gained about 30lbs last year   Food logging: yes 1100kcal  Increased appetite/cravings:no  Sees dietician   Exercise: restarted walking , had eye surgery for cataract  Hydration: 90oz water  Alcohol: rare twice a mo  Sleep: ok        The following portions of the patient's history were reviewed and updated as appropriate: allergies, current medications, past family history, past medical history, past social history, past surgical history, and problem list       Review of Systems   Constitutional: Negative for activity change  Fatigue  HENT: Negative for trouble swallowing  Respiratory: Negative for shortness of breath  Cardiovascular: Negative for chest pain, edema  Gastrointestinal: Negative for abdominal pain, nausea and vomiting, acid reflux, constipation/diarrhea  Psychiatric/Behavioral: Negative for behavioral problems , anxiety or depression    Objective:  /80 (BP Location: Left arm, Patient Position: Sitting, Cuff Size: Large)   Pulse 60   Resp 16   Ht 5' (1 524 m)   Wt 107 kg (236 lb 14 4 oz)   BMI 46 27 kg/m²   Constitutional: Well-developed, well-nourished and Obese Body mass index is 46 27 kg/m²  Elvira Byrne HEENT: No conjunctival injection  No thyroid masses  Pulmonary: No increased work of breathing or signs of respiratory distress  Clear respiratory sounds  CV: Well-perfused, Regular rate and rhythm, no murmurs, no edema  GI: increased abdominal girth  Non-distended  Not tender   Neuro: Oriented to person, place and time  Normal Speech  Normal gait  Psych: Normal affect and mood  No delusion or hallucinations, normal thought process    Labs and Imaging  Recent labs and imaging have been personally reviewed    Lab Results   Component Value Date    WBC 5 46 01/23/2023    HGB 13 0 01/23/2023    HCT 42 6 01/23/2023    MCV 99 (H) 01/23/2023     01/23/2023     Lab Results   Component Value Date     02/26/2015    SODIUM 139 01/23/2023    K 4 1 01/23/2023     01/23/2023    CO2 26 01/23/2023    ANIONGAP 6 02/26/2015    AGAP 5 01/23/2023    BUN 11 01/23/2023    CREATININE 0 75 01/23/2023    GLUC 95 11/15/2022    GLUF 80 01/23/2023    CALCIUM 8 6 01/23/2023    AST 23 01/23/2023    ALT 38 01/23/2023    ALKPHOS 37 (L) 01/23/2023    PROT 7 5 02/26/2015    TP 6 8 01/23/2023    BILITOT 0 4 02/26/2015    TBILI 0 61 01/23/2023    EGFR 95 01/23/2023     Lab Results   Component Value Date    HGBA1C 5 1 06/30/2022     Lab Results   Component Value Date    [id number] 2 910 01/23/2023     Lab Results   Component Value Date    CHOLESTEROL 161 06/30/2022     Lab Results   Component Value Date    HDL 75 06/30/2022     Lab Results   Component Value Date    TRIG 89 06/30/2022     Lab Results   Component Value Date    LDLCALC 68 06/30/2022

## 2023-02-07 NOTE — PROGRESS NOTES
Current Weight: 235# Patient has lost 2# x 2 weeks  Reevue indirect calorimeter revealed REE is WNL (-1%) compared to the predictive normal for someone of same age, height, and gender  Recommended patient continue with 1,100-1,300 kcal on non-exercise days and flex calories up to 1,500 kcal on exercise days with 30 mins of moderate activity  Encouraged patient to honor hunger/fullness cues and emphasized the importance of balance; focusing on lean proteins, non-starchy vegetables, portioned carbohydrates, and unsaturated fats  Patient will f/u x 6 weeks for body composition       Indirect Calorimetry  REE: 1,728 kcal     [employer] Needs (at 235#)  BMR: 1,627 kcal  Maintenance calories (sedentary): 1,953 kcal  1-2# loss weekly sedentary: 953-1,453 kcal  1-2# loss weekly lightly active: 1,238-1,738 kcal

## 2023-02-08 ENCOUNTER — OFFICE VISIT (OUTPATIENT)
Dept: BARIATRICS | Facility: CLINIC | Age: 48
End: 2023-02-08

## 2023-02-08 VITALS — WEIGHT: 235 LBS | BODY MASS INDEX: 46.13 KG/M2 | HEIGHT: 60 IN

## 2023-02-08 DIAGNOSIS — R63.5 ABNORMAL WEIGHT GAIN: Primary | ICD-10-CM

## 2023-02-09 ENCOUNTER — OFFICE VISIT (OUTPATIENT)
Dept: FAMILY MEDICINE CLINIC | Facility: CLINIC | Age: 48
End: 2023-02-09

## 2023-02-09 VITALS
RESPIRATION RATE: 16 BRPM | DIASTOLIC BLOOD PRESSURE: 64 MMHG | HEIGHT: 60 IN | WEIGHT: 236 LBS | OXYGEN SATURATION: 99 % | TEMPERATURE: 97.3 F | HEART RATE: 60 BPM | SYSTOLIC BLOOD PRESSURE: 90 MMHG | BODY MASS INDEX: 46.33 KG/M2

## 2023-02-09 DIAGNOSIS — Z01.818 PREOP EXAMINATION: Primary | ICD-10-CM

## 2023-02-09 NOTE — PROGRESS NOTES
Presurgical Evaluation    Subjective:      Patient ID: Miranda Jay is a 56 y o  female  Chief Complaint   Patient presents with   • Pre-op Exam     Cataract 2/27/23, right       HPI    Pt here for preop exam for cataract surgery on the right  The following portions of the patient's history were reviewed and updated as appropriate: allergies, current medications, past family history, past medical history, past social history, past surgical history and problem list     Procedure date: 2/27/23    Surgeon:  Dr Woody He MD ([facility])  Planned procedure:  Cataract surgery right eye  Diagnosis for procedure:  Cataract on the right eye    Prior anesthesia: Yes   General; Complications:  None / Tolerated well    CAD History: None   NOTE: Patient should see Cardiology if time available before surgery, and if appropriate  Pulmonary History: None    Renal history: None    Diabetes History:  None     Neurological History: None     On Immunosuppressant meds/biologics: No      Review of Systems   Constitutional: Negative for chills and fever  HENT: Negative for congestion, rhinorrhea and sore throat  Eyes:        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   Respiratory: Negative for shortness of breath  Cardiovascular: Negative for chest pain  Gastrointestinal: Negative for abdominal pain  Neurological: Negative for headaches           Current Outpatient Medications   Medication Sig Dispense Refill   • albuterol (PROVENTIL HFA,VENTOLIN HFA) 90 mcg/act inhaler Inhale 2 puffs every 6 (six) hours as needed     • calcium citrate (CALCITRATE) 950 (200 Ca) MG tablet Take 1 tablet by mouth daily     • cyanocobalamin (VITAMIN B-12) 100 mcg tablet      • Docusate Calcium (STOOL SOFTENER PO) Take by mouth     • ENBREL SURECLICK 50 MG/ML injection      • Multiple Vitamin (MULTI-VITAMIN DAILY) TABS Take by mouth     • Semaglutide-Weight Management (WEGOVY) 0 5 MG/0 5ML Inject 0 5 mL (0 5 mg total) under the skin once a week 2 mL 1   • VITAMIN D PO Take by mouth     • ferrous sulfate 325 (65 FE) MG EC tablet Take 325 mg by mouth 3 (three) times a day with meals (Patient not taking: Reported on 9/7/2022)     • Daisy 0 25-35 MG-MCG per tablet TAKE 1 TABLET BY MOUTH EVERY DAY (Patient not taking: Reported on 1/5/2023) 84 tablet 3   • PEDIATRIC MULTIVITAMINS-IRON PO  (Patient not taking: Reported on 8/4/2022)       No current facility-administered medications for this visit  Allergies on file:   Patient has no known allergies      Patient Active Problem List   Diagnosis   • Encounter for surgical aftercare following surgery of digestive system   • Postsurgical malabsorption   • Psoriatic arthritis ([facility] [address] )   • Annual physical exam   • Low ferritin   • Preop examination   • BMI 45 0-49 9, adult ([facility] [address] )   • Morbid obesity ([facility] [address] )   • Hx of gastric bypass        Past Medical History:   Diagnosis Date   • Arthritis    • Asthma    • GERD (gastroesophageal reflux disease)    • Obesity        Past Surgical History:   Procedure Laterality Date   • GASTRIC BYPASS     • PARS PLANA VITRECTOMY W/ REPAIR OF MACULAR HOLE Right 09/2022       Family History   Problem Relation Age of Onset   • Breast cancer Mother 58   • Heart disease Father    • No Known Problems Sister    • No Known Problems Daughter    • No Known Problems Maternal Grandmother    • Diabetes Maternal Grandfather    • No Known Problems Paternal Grandmother    • No Known Problems Paternal Grandfather    • No Known Problems Brother    • No Known Problems Son    • No Known Problems Maternal Uncle    • No Known Problems Paternal Aunt        Social History     Tobacco Use   • Smoking status: Never   • Smokeless tobacco: Never   Vaping Use   • Vaping Use: Never used   Substance Use Topics   • Alcohol use: Yes     Comment: socially   • Drug use: No       Objective:    Vitals:    02/09/23 0951   BP: 90/64   BP Location: Left arm   Patient Position: Sitting   Cuff Size: Large   Pulse: 60   Resp: 16   Temp: (!) 97 3 °F (36 3 °C)   TempSrc: Temporal   SpO2: 99%   Weight: 107 kg (236 lb)   Height: 5' (1 524 m)        Physical Exam  Vitals reviewed  Constitutional:       General: She is not in acute distress  Appearance: Normal appearance  She is obese  She is not ill-appearing, toxic-appearing or diaphoretic  Cardiovascular:      Rate and Rhythm: Normal rate and regular rhythm  Pulses: Normal pulses  Heart sounds: Normal heart sounds  No murmur heard  Pulmonary:      Effort: Pulmonary effort is normal  No respiratory distress  Breath sounds: Normal breath sounds  Abdominal:      General: Abdomen is flat  There is no distension  Palpations: Abdomen is soft  Musculoskeletal:         General: No swelling or tenderness  Normal range of motion  Skin:     General: Skin is warm and dry  Capillary Refill: Capillary refill takes less than 2 seconds  Coloration: Skin is not jaundiced  Neurological:      General: No focal deficit present  Mental Status: She is alert and oriented to person, place, and time  Psychiatric:         Mood and Affect: Mood normal          Preop labs/testing available and reviewed: no    eGFR   Date Value Ref Range Status   01/23/2023 95 ml/min/1 73sq m Final     WBC   Date Value Ref Range Status   01/23/2023 5 46 4 31 - 10 16 Thousand/uL Final        EKG no    Echo no    Stress test/cath no    PFT/[place] no    Functional capacity: Mowing lawn, Push mower  5-6 Mets   Pick the highest level patient can comfortably perform   4 mets or greater for surgery    RCRI  High Risk surgery? 1 Point  CAD History:         1 Point   MI; Positive Stress Test; CP due to Mi;  Nitrate Usage to control Angina;  Pathologic Q wave on EKG  CHF Active:         1 Point   Pulm Edema; Paroxysmal Nocturnal Dyspnea;  Bibasilar Rales (crackles);S3; CHF on CXR  Cerebrovascular Disease (TIA or CVA):     1 Point  DM on Insulin:        1 Point  Serum Creat >2 0 mg/dl:       1 Point          Total Points: 0     Scorin: Class I, Very Low Risk (0 4%)     1: Class II, Low risk (0 9%)     2: Class III Moderate (6 6%)     3: Class IV High (>11%)      KARL Risk:  GFR:   eGFR   Date Value Ref Range Status   2023 95 ml/min/1 73sq m Final         For PCP:  If GFR>60, Hold ACE/ARB/Diuretic on the day of surgery, and NSAIDS 10 days before  If GFR<45, Consider PRE and POST op Nephrology Consult  If 46 <GFR> 59 : Has Patient had KARL in last 6 Months? no   If YES: Preop Nephrology consult   If No:  Jennifer Jha Nephrology consult  Assessment/Plan:    Patient is medically optimized (cleared) for the planned procedure  Further testing/evaluation is not required  Postop concerns: no    Problem List Items Addressed This Visit        Other    Preop examination - Primary     preop exam completed at this time  No additional lab required at this time  Patient is currently medically optimized for her up coming procedure  Based on RCRI, there is a 0 4% chance of major adverse cardiac event             There are no diagnoses linked to this encounter        Pre-Surgery Instructions:   Medication Instructions   • albuterol (PROVENTIL HFA,VENTOLIN HFA) 90 mcg/act inhaler per anesthesia guidelines    • calcium citrate (CALCITRATE) 950 (200 Ca) MG tablet per anesthesia guidelines    • cyanocobalamin (VITAMIN B-12) 100 mcg tablet per anesthesia guidelines    • Docusate Calcium (STOOL SOFTENER PO) per anesthesia guidelines    • ENBREL SURECLICK 50 MG/ML injection per anesthesia guidelines    • Multiple Vitamin (MULTI-VITAMIN DAILY) TABS per anesthesia guidelines    • Semaglutide-Weight Management (WEGOVY) 0 5 MG/0 5ML per anesthesia guidelines    • VITAMIN D PO per anesthesia guidelines         NOTE: Please use the above to review important meds for your specialty, the remainder "per anesthesia Guidelines "    NOTE: Please place an Inbasket message for "SOC" pool for complicated patients  DEB Vazquez  Family Medicine    Please excuse any "sound-alike" errors that may have ocurred during the process of dictation  Parts of this note have been dictated and there may be errors present in the transcription process  Thank you

## 2023-02-27 ENCOUNTER — SURGERY/PROCEDURE (OUTPATIENT)
Dept: URBAN - METROPOLITAN AREA SURGICAL CENTER 6 | Facility: SURGICAL CENTER | Age: 48
End: 2023-02-27

## 2023-02-27 DIAGNOSIS — H25.811: ICD-10-CM

## 2023-02-27 PROCEDURE — 66984 XCAPSL CTRC RMVL W/O ECP: CPT

## 2023-02-28 ENCOUNTER — 1 DAY POST-OP (OUTPATIENT)
Dept: URBAN - METROPOLITAN AREA CLINIC 6 | Facility: CLINIC | Age: 48
End: 2023-02-28

## 2023-02-28 DIAGNOSIS — Z96.1: ICD-10-CM

## 2023-02-28 PROCEDURE — 99024 POSTOP FOLLOW-UP VISIT: CPT

## 2023-02-28 ASSESSMENT — TONOMETRY
OS_IOP_MMHG: 15
OD_IOP_MMHG: 12

## 2023-02-28 ASSESSMENT — VISUAL ACUITY
OD_SC: 20/300
OD_PH: 20/250

## 2023-03-07 ENCOUNTER — ANNUAL EXAM (OUTPATIENT)
Dept: OBGYN CLINIC | Facility: CLINIC | Age: 48
End: 2023-03-07

## 2023-03-07 VITALS
BODY MASS INDEX: 45.94 KG/M2 | SYSTOLIC BLOOD PRESSURE: 120 MMHG | WEIGHT: 234 LBS | DIASTOLIC BLOOD PRESSURE: 72 MMHG | HEIGHT: 60 IN

## 2023-03-07 DIAGNOSIS — Z01.419 ENCOUNTER FOR GYNECOLOGICAL EXAMINATION WITHOUT ABNORMAL FINDING: Primary | ICD-10-CM

## 2023-03-07 DIAGNOSIS — Z01.419 PAP SMEAR, AS PART OF ROUTINE GYNECOLOGICAL EXAMINATION: ICD-10-CM

## 2023-03-07 DIAGNOSIS — Z12.31 ENCOUNTER FOR SCREENING MAMMOGRAM FOR MALIGNANT NEOPLASM OF BREAST: ICD-10-CM

## 2023-03-07 RX ORDER — OFLOXACIN 3 MG/ML
SOLUTION/ DROPS OPHTHALMIC
COMMUNITY
Start: 2023-02-28

## 2023-03-07 RX ORDER — PREDNISOLONE ACETATE 10 MG/ML
SUSPENSION/ DROPS OPHTHALMIC
COMMUNITY
Start: 2023-02-28

## 2023-03-07 NOTE — PROGRESS NOTES
Assessment/Plan:    No problem-specific Assessment & Plan notes found for this encounter  Diagnoses and all orders for this visit:    Encounter for gynecological examination without abnormal finding  -     Liquid-based pap, screening    Pap smear, as part of routine gynecological examination    Encounter for screening mammogram for malignant neoplasm of breast  -     Mammo screening bilateral w 3d & cad; Future    Other orders  -     ofloxacin (OCUFLOX) 0 3 % ophthalmic solution; PUT 1 DROP IN RIGHT EYE 4 TIMES A DAY  -     prednisoLONE acetate (PRED FORTE) 1 % ophthalmic suspension; PUT 1 DROP IN RIGHT EYE 4 TIMES A DAY          Normal gynecological physical examination  Self-breast examination stressed  Mammogram ordered  Discussed regular exercise, healthy diet, importance of vitamin D and calcium supplements  Discussed importance of sun block use during periods of prolonged sun exposure  Patient will be seen in 1 year for routine gynecologic and medical examination  Patient will call office for any problems, concerns, or issues which may arise during the interim  Patient has a Cologuard kit at home, but hasn't had the opportunity to complete it yet  She was encouraged to complete the Cologuard cancer screening  Subjective:      Carmen Mendiola is a 42-year-old female with a PMH of asthma and GERD presenting to the office for an annual exam  She feels well and has no specific complaints today  Her periods are somewhat irregular and she may go a few months without having a period, but she denies excessively heavy bleeding or clotting with her periods  She also denies excessive cramping with her periods  She denies hot flashes or night sweats  She receives mammograms yearly  She is sexually active and notes some vaginal dryness with intercourse  She denies fever, chills, chest pain, SOB, abdominal pain, or pelvic pain       HPI    Patient ID: Mindi Leavitt is a 56 y o  female who presents today for her annual gynecologic and medical examination    Menstrual status: Periods are irregular  She may go several months without having a period  Her LMP was in February of 2023  She denies excessive bleeding, clotting, or cramping with her periods  Vasomotor symptoms: none    Patient reports normal appetite    Patient reports normal bowel and bladder habits    Patient denies any significant pelvic or abdominal pain    Patient denies any headaches, chest pain, shortness of breath fever shakes or chills    Patient denies any COVID 19 symptoms including cough or loss of taste or smell    COVID vaccine status: Patient has not been vaccinated against covid-19    Medical problems: asthma, GERD    Colonoscopy status: Patient has never had a colonoscopy  Colonoscopy or Cologuard screening with highly recommended  Mammogram status: patient receives mammograms yearly  The following portions of the patient's history were reviewed and updated as appropriate: allergies, current medications, past family history, past medical history, past social history, past surgical history and problem list     Review of Systems   Constitutional: Negative  Negative for appetite change, diaphoresis, fatigue, fever and unexpected weight change  HENT: Negative  Eyes: Negative  Respiratory: Negative  Negative for shortness of breath  Cardiovascular: Negative  Negative for chest pain  Gastrointestinal: Negative  Negative for abdominal pain, blood in stool, constipation, diarrhea, nausea and vomiting  Endocrine: Negative  Negative for cold intolerance and heat intolerance  Genitourinary: Negative  Negative for dysuria, frequency, hematuria, urgency, vaginal bleeding, vaginal discharge and vaginal pain  Musculoskeletal: Negative  Followed for psoriatic arthritis   Skin: Negative  Allergic/Immunologic: Negative  Neurological: Negative  Negative for dizziness and headaches  Hematological: Negative  Negative for adenopathy  Psychiatric/Behavioral: Negative  Objective:      /72   Ht 5' (1 524 m)   Wt 106 kg (234 lb)   LMP 02/08/2023   BMI 45 70 kg/m²          Physical Exam  Constitutional:       General: She is not in acute distress  Appearance: Normal appearance  She is well-developed  She is not diaphoretic  HENT:      Head: Normocephalic and atraumatic  Eyes:      Pupils: Pupils are equal, round, and reactive to light  Cardiovascular:      Rate and Rhythm: Normal rate and regular rhythm  Heart sounds: Normal heart sounds  No murmur heard  No friction rub  No gallop  Pulmonary:      Effort: Pulmonary effort is normal       Breath sounds: Normal breath sounds  Chest:   Breasts:     Breasts are symmetrical       Right: No inverted nipple, mass, nipple discharge, skin change or tenderness  Left: No inverted nipple, mass, nipple discharge, skin change or tenderness  Abdominal:      General: Bowel sounds are normal       Palpations: Abdomen is soft  Genitourinary:     General: Normal vulva  Exam position: Supine  Labia:         Right: No rash or lesion  Left: No rash or lesion  Urethra: No urethral swelling or urethral lesion  Vagina: Normal  No vaginal discharge, erythema, tenderness or bleeding  Cervix: No discharge or friability  Uterus: Not enlarged and not tender  Adnexa:         Right: No mass, tenderness or fullness  Left: No mass, tenderness or fullness  Rectum: Normal  Guaiac result negative  Comments: Good pelvic support  Musculoskeletal:         General: Normal range of motion  Cervical back: Normal range of motion and neck supple  Lymphadenopathy:      Cervical: No cervical adenopathy  Upper Body:      Right upper body: No supraclavicular adenopathy  Left upper body: No supraclavicular adenopathy  Skin:     General: Skin is warm and dry  Findings: No rash  Neurological:      General: No focal deficit present  Mental Status: She is alert and oriented to person, place, and time  Psychiatric:         Mood and Affect: Mood normal          Speech: Speech normal          Behavior: Behavior normal          Thought Content:  Thought content normal          Judgment: Judgment normal

## 2023-03-07 NOTE — PATIENT INSTRUCTIONS
Normal gynecological physical examination  Self-breast examination stressed  Mammogram ordered  Discussed regular exercise, healthy diet, importance of vitamin D and calcium supplements  Discussed importance of sun block use during periods of prolonged sun exposure  Patient will be seen in 1 year for routine gynecologic and medical examination  Patient will call office for any problems, concerns, or issues which may arise during the interim  Patient has a Cologuard kit at home, but hasn't had the opportunity to complete it yet  She was encouraged to complete the Cologuard cancer screening

## 2023-03-08 ENCOUNTER — 1 WEEK POST-OP (OUTPATIENT)
Dept: URBAN - METROPOLITAN AREA CLINIC 6 | Facility: CLINIC | Age: 48
End: 2023-03-08

## 2023-03-08 DIAGNOSIS — Z96.1: ICD-10-CM

## 2023-03-08 PROCEDURE — 99024 POSTOP FOLLOW-UP VISIT: CPT

## 2023-03-08 ASSESSMENT — VISUAL ACUITY: OS_CC: 20/25

## 2023-03-08 ASSESSMENT — TONOMETRY: OD_IOP_MMHG: 13

## 2023-03-10 ENCOUNTER — TELEPHONE (OUTPATIENT)
Age: 48
End: 2023-03-10

## 2023-03-10 NOTE — TELEPHONE ENCOUNTER
Called patient and spoke with patient  Patient confirmed the rescheduled appointment with another provider due to provider not seeing patients at the location any longer

## 2023-03-14 LAB
HPV HR 12 DNA CVX QL NAA+PROBE: POSITIVE
HPV16 DNA CVX QL NAA+PROBE: NEGATIVE
HPV18 DNA CVX QL NAA+PROBE: NEGATIVE
LAB AP GYN PRIMARY INTERPRETATION: ABNORMAL
Lab: ABNORMAL
PATH INTERP SPEC-IMP: ABNORMAL

## 2023-03-15 ENCOUNTER — TELEPHONE (OUTPATIENT)
Dept: OBGYN CLINIC | Facility: CLINIC | Age: 48
End: 2023-03-15

## 2023-03-15 NOTE — TELEPHONE ENCOUNTER
----- Message from Shad Mcginnis MD sent at 3/15/2023 12:58 PM EDT -----  Pap smear remains stable with positive HPV    Consistent with biopsies in the recent past    We will see patient for routine follow-up as planned    Thanks

## 2023-03-22 ENCOUNTER — OFFICE VISIT (OUTPATIENT)
Dept: BARIATRICS | Facility: CLINIC | Age: 48
End: 2023-03-22

## 2023-03-22 VITALS — WEIGHT: 228.5 LBS | BODY MASS INDEX: 44.86 KG/M2 | HEIGHT: 60 IN

## 2023-03-22 DIAGNOSIS — R63.5 ABNORMAL WEIGHT GAIN: Primary | ICD-10-CM

## 2023-04-28 ENCOUNTER — TELEPHONE (OUTPATIENT)
Dept: ADMINISTRATIVE | Facility: OTHER | Age: 48
End: 2023-04-28

## 2023-04-28 NOTE — PROGRESS NOTES
04/28/23 3:40 PM    The patient was called and a message was left to call the ordering provider's office regarding an open order  Thank you    Andrea Recinos MA  [facility]

## 2023-05-08 DIAGNOSIS — E66.01 MORBID OBESITY (HCC): ICD-10-CM

## 2023-05-08 RX ORDER — SEMAGLUTIDE 1 MG/.5ML
INJECTION, SOLUTION SUBCUTANEOUS
Qty: 2 ML | Refills: 0 | Status: SHIPPED | OUTPATIENT
Start: 2023-05-08

## 2023-05-11 ENCOUNTER — TELEPHONE (OUTPATIENT)
Dept: BARIATRICS | Facility: CLINIC | Age: 48
End: 2023-05-11

## 2023-05-11 ENCOUNTER — CLINICAL SUPPORT (OUTPATIENT)
Dept: BARIATRICS | Facility: CLINIC | Age: 48
End: 2023-05-11

## 2023-05-11 VITALS
WEIGHT: 220.2 LBS | SYSTOLIC BLOOD PRESSURE: 124 MMHG | HEART RATE: 62 BPM | HEIGHT: 60 IN | DIASTOLIC BLOOD PRESSURE: 62 MMHG | BODY MASS INDEX: 43.23 KG/M2 | RESPIRATION RATE: 18 BRPM

## 2023-05-11 DIAGNOSIS — E66.01 MORBID OBESITY (HCC): Primary | ICD-10-CM

## 2023-05-11 NOTE — PROGRESS NOTES
Patient comes to office today for Weight Check  Insurance requires episodic Weight checks to approve continuation of medication  Patient has lost 14 pounds since initial Weight of 234 pounds on 01/05/23  Patient expresses has had no negative side effects to KETAN PATIÑO

## 2023-05-30 NOTE — PROGRESS NOTES
"Weight Management Medical Nutrition Assessment  Jovita Hendrickson is here today for 3/3 RD Bundle  Current Weight: 225#  Patient has lost 3 5# x 2 months and overall has lost 16#  Patient continues utilizing FQQYGE  Feels it has helped with feelings of fullness but unsure if additional effects to aid weight loss  Continues logging (averaging 1,100 kcal with 50-70 g protein)  Recommend patient increase calories to 1,300-1,500 kcal and aim for a minimum protein goal of 70 g/day  Food items discussed and reviewed to increase protein and calorie intake without significantly increasing portions as patient reports she's not able to increase portion sizes  Options for high protein breakfast reviewed  Patient reports mental barrier to increasing calories as she has struggled with her weight and relationship with food her whole life  Reminded patient that both myself and LASHONDA MCGUIRE recommend an increase in calories and reviewed that calories <1,1000 kcal has not produced significant weight loss for her previously  Reviewed REE calories recommendations as well  Patient will f/u with LASHONDA MCGUIRE x 2 weeks and leave for [place] beach for the [place]  Patient reports she will likely increase her activity during this time  Emphasized the importance of adequate caloric and protein intake with increased exercise  Patient will return in September for body composition testing       Patient seen by Medical Provider in past 6 months:  yes  Requested to schedule appointment with Medical Provider: Yes    Anthropometric Measurements  Start Weight (#): 241# (9/1/22)  Current Weight (#): 225#  TBW % Change from start weight: 6 6%  Ideal Body Weight (#): 128# (BMI @ 25; 60\")  Goal Weight (#): --    Weight Loss History  Previous weight loss attempts: Exercise  Commercial Programs (Weight Watchers, MD2U, etc )  Self Created Diets (Portion Control, Healthy Food Choices, etc )  s/p Nicol-En-Y Gastric Bypass ([place]; 2511)     Food and Nutrition Related " History  Wake up: 6:30 am  Bed Time: 10 pm     Food Recall  Coffee (black)  Breakfast (8 am): yesterday was, apple w/ 1 Tbsp PB Or bagel thin w/ cream cheese + EBTB seasoning Or 647 english muffin w/ butter Or Quest protein bar (200 kcal, 21 g protein)  Snack: --  Lunch (12 pm): chicken breast w/ salad + feta + balsamic dressing Or thin sliced peppered turkey breast + aged cheddar + non-starchy vegetables + 647 bread Or lean cuisine  Snack (2-3 pm): LF popcorn Or ranch rice cakes Or hummus + veggies (peppers or snap peas) or nut thins  Dinner (5-6 pm): chicken/beef with veggies or low-carb taco/taco bake/burrito bowl Or lean meatloaf muffins Or lean cuisine   Snack: -- Or small mint Or clementines      Beverages: water (plain or sparkling) and coffee (16 oz black)  Volume of beverage intake: 50-70 ounces    Weekends: Worse, may have more alcohol or dining out (will always get a salad with protein)  Cravings: salt  Trouble area of day: none    Frequency of Eating out: maybe 1-2x/week  Food restrictions: avoids carbs, won't eat fish/yogurt/cottage cheese  Cooking: self  Food Shopping: self    Occupation: Consultant for pharmaceuticals (sedentary)    Physical Activity  Activity: No structured routine (time is a barrier)  Frequency:rarely  Physical limitations/barriers to exercise: arthritis pain, inflamed tendon    Estimated Needs  [place] [employer] Needs (Needs at 225#)  BMR: 1,582 kcal  Maintenance calories (sedentary): 1,898 kcal  1-2# loss weekly sedentary: 898-1,398 kcal  1-2# loss weekly lightly active: 1,175-1,675 kcal    Protein: 70-87 grams (1 2-1 5g/kg IBW)  Fluid: 68 ounces (35mL/kg IBW)    Nutrition Diagnosis  Yes;     Overweight/obesity related to Excess energy intake as evidenced by BMI more than normative standard for age and sex (obesity-grade III 36+)     Nutrition Intervention    Nutrition Prescription  Calories: 1,100-1,300 kcal   Protein:  g  Fluid: 68+ ounces    Meal Plan (Matt/Pro)  Breakfast: 200-300/20-30  Snack: --  Lunch: 941/90-54  Snack: 100-150/5+  Dinner: 404/65-98  Snack: 100-150/5+    Nutrition Education  Calorie controlled menu  Lean protein food choices  Healthy snack options  Food journaling tips    Nutrition Counseling  Strategies: meal planning, portion sizes, healthy snack choices, hydration, fiber intake, protein intake, exercise, food logging    Monitoring and Evaluation:    Evaluation criteria  Energy Intake  Meet protein needs  Maintain adequate hydration  Monitor weekly weight  Meal planning/preparation  Food journal   Decreased portions at mealtimes and snacks  Physical activity     Barriers to learning:none  Readiness to change: Action:  (Changing behavior)  Comprehension: very good  Expected Compliance: good

## 2023-05-31 ENCOUNTER — OFFICE VISIT (OUTPATIENT)
Dept: BARIATRICS | Facility: CLINIC | Age: 48
End: 2023-05-31

## 2023-05-31 VITALS — HEIGHT: 60 IN | BODY MASS INDEX: 44.17 KG/M2 | WEIGHT: 225 LBS

## 2023-05-31 DIAGNOSIS — R63.5 ABNORMAL WEIGHT GAIN: Primary | ICD-10-CM

## 2023-06-02 ENCOUNTER — TELEPHONE (OUTPATIENT)
Dept: BARIATRICS | Facility: CLINIC | Age: 48
End: 2023-06-02

## 2023-06-02 NOTE — TELEPHONE ENCOUNTER
Received notification that prior authorization for [facility] MAHESH WANG WOODY was about to  and needs renewal   Tried to do on Cover My Meds, however it couldn't locate the member  I called insurance company [phone] and was assisted by Roberto Herbert  She stated that renewal was automatic and she put in test claim to make sure new dose would be covered and it was  No prior authorization needed on our end

## 2023-06-12 ENCOUNTER — TELEPHONE (OUTPATIENT)
Dept: BARIATRICS | Facility: CLINIC | Age: 48
End: 2023-06-12

## 2023-06-12 DIAGNOSIS — E66.01 MORBID OBESITY (HCC): Primary | ICD-10-CM

## 2023-06-13 ENCOUNTER — FOLLOW UP (OUTPATIENT)
Dept: URBAN - METROPOLITAN AREA CLINIC 6 | Facility: CLINIC | Age: 48
End: 2023-06-13

## 2023-06-13 DIAGNOSIS — Z98.890: ICD-10-CM

## 2023-06-13 DIAGNOSIS — H34.11: ICD-10-CM

## 2023-06-13 DIAGNOSIS — Z96.1: ICD-10-CM

## 2023-06-13 DIAGNOSIS — H25.12: ICD-10-CM

## 2023-06-13 PROCEDURE — 92014 COMPRE OPH EXAM EST PT 1/>: CPT

## 2023-06-13 ASSESSMENT — VISUAL ACUITY
OD_SC: 20/200
OS_CC: 20/20-1

## 2023-06-13 ASSESSMENT — TONOMETRY
OS_IOP_MMHG: 13
OD_IOP_MMHG: 10

## 2023-06-14 ENCOUNTER — OFFICE VISIT (OUTPATIENT)
Dept: BARIATRICS | Facility: CLINIC | Age: 48
End: 2023-06-14
Payer: COMMERCIAL

## 2023-06-14 ENCOUNTER — TELEPHONE (OUTPATIENT)
Dept: BARIATRICS | Facility: CLINIC | Age: 48
End: 2023-06-14

## 2023-06-14 VITALS
HEART RATE: 61 BPM | HEIGHT: 60 IN | SYSTOLIC BLOOD PRESSURE: 118 MMHG | RESPIRATION RATE: 16 BRPM | BODY MASS INDEX: 43.74 KG/M2 | DIASTOLIC BLOOD PRESSURE: 70 MMHG | WEIGHT: 222.8 LBS

## 2023-06-14 DIAGNOSIS — Z98.84 HX OF GASTRIC BYPASS: Primary | ICD-10-CM

## 2023-06-14 DIAGNOSIS — E66.01 MORBID OBESITY (HCC): ICD-10-CM

## 2023-06-14 PROCEDURE — 99214 OFFICE O/P EST MOD 30 MIN: CPT | Performed by: FAMILY MEDICINE

## 2023-06-14 NOTE — PROGRESS NOTES
Assessment/Plan:  Jess Kapoor was seen today for follow-up  Diagnoses and all orders for this visit:      Morbid obesity ([address] )  Made progress on Wegovy on 1 mg now lost total 5 7%, plan to increase dosage  PA started for 1 7 mg  -blood work reviewed normal thyroid fc  1200kcal daily  Cont dietician advice  Return for nurse visit in 1 mo and 2 mo with me     Component Ref Range & Units 1/23/23  7:54 AM    TSH 3RD GENERATON 0 450 - 4 500 uIU/mL 2 910      Component Ref Range & Units 1/23/23  7:54 AM 6/30/22  9:03 AM   Vit D, 25-Hydroxy 30 0 - 100 0 ng/mL 31 9  27 6 Low      Component Ref Range & Units 1/23/23  7:54 AM 6/30/22  9:03 AM   Iron Saturation 15 - 50 % 33  13 Low     TIBC 250 - 450 ug/dL 300  947 High     Iron 50 - 170 ug/dL 99  127 CM          Weight not at goal  Nutrition prescription:  Calorie goal: 1200kcal  Encourage mindful eating, portion control, motivational interview performed to help patient reach goals   Patient denies personal and family history of  pancreatitis, thyroid cancer, MEN-2 tumors  Denies any hx of glaucoma, seizures, kidney stones, gallstones  Denies Hx of CAD, PAD, palpitations, arrhythmia  Denies uncontrolled anxiety or depression, suicidal ideation or behavior, insomnia or sleep disturbance  Hx of gastric bypass  continue to reach protein goal at least 60 g daily and 64 oz fluids  Irregular periods  Barrier to weight loss  Off BCP    Subjective:   Chief Complaint   Patient presents with   • Follow-up     Patient here to discuss weight associated problems and nutrition goals  HPI: Yoselin Chand  is a 56 y o  female with excess weight/obesity here to pursue weight management  Patient is pursuing Conservative Program    Most recent notes and records were reviewed    Initial weight loss goal of 5-10% weight loss for improved health  Wt Readings from Last 10 Encounters:   06/14/23 101 kg (222 lb 12 8 oz)   05/31/23 102 kg (225 lb)   05/11/23 99 9 kg (220 lb 3 2 oz)   04/11/23 105 kg (230 lb 6 4 oz)   03/22/23 104 kg (228 lb 8 oz)   03/07/23 106 kg (234 lb)   02/09/23 107 kg (236 lb)   02/08/23 107 kg (235 lb)   02/06/23 107 kg (236 lb 14 4 oz)   01/25/23 108 kg (237 lb)     Hx of gastric bypass in 2005 lost 135lbs, kept it off long time   Weight before surgery 332lbs   Initial weight:in weight management 244lbs   236lbs Wegovy at 0 25mg   Current weight:230lbs Wegovy at 1mg no side effects  Change in weight: 5,7%    REE: 1,728 kcal   She goes through perimenopause, gained about 30lbs last year     Food logging: yes 1100kcal-1300kcal  Increased appetite/cravings:no  Sees dietician did metabolic testing doing well   Has 3 meals per day discussed with dietician foods  Exercise: restarted walking   Hydration: 90oz water  Alcohol: rare twice a mo  Sleep: ok        The following portions of the patient's history were reviewed and updated as appropriate: allergies, current medications, past family history, past medical history, past social history, past surgical history, and problem list       Review of Systems   Constitutional: Negative for activity change  Fatigue  HENT: Negative for trouble swallowing  Respiratory: Negative for shortness of breath  Cardiovascular: Negative for chest pain, edema  Gastrointestinal: Negative for abdominal pain, nausea and vomiting, acid reflux, constipation/diarrhea  Psychiatric/Behavioral: Negative for behavioral problems , anxiety or depression    Objective:  /70 (BP Location: Left arm, Patient Position: Sitting, Cuff Size: Large)   Pulse 61   Resp 16   Ht 5' (1 524 m)   Wt 101 kg (222 lb 12 8 oz)   BMI 43 51 kg/m²   Constitutional: Well-developed, well-nourished and Obese Body mass index is 43 51 kg/m²  Maegan Negron HEENT: No conjunctival injection  No thyroid masses  Pulmonary: No increased work of breathing or signs of respiratory distress  Clear respiratory sounds  CV: Well-perfused, Regular rate and rhythm, no murmurs, no edema  GI: increased abdominal girth  Non-distended  Not tender   Neuro: Oriented to person, place and time  Normal Speech  Normal gait  Psych: Normal affect and mood  No delusion or hallucinations, normal thought process    Labs and Imaging  Recent labs and imaging have been personally reviewed    Lab Results   Component Value Date    HCT 42 6 01/23/2023    HGB 13 0 01/23/2023    MCV 99 (H) 01/23/2023     01/23/2023    WBC 5 46 01/23/2023     Lab Results   Component Value Date    AGAP 5 01/23/2023    ALKPHOS 37 (L) 01/23/2023    ALT 38 01/23/2023    ANIONGAP 6 02/26/2015    AST 23 01/23/2023    BILITOT 0 4 02/26/2015    BUN 11 01/23/2023    CALCIUM 8 6 01/23/2023     01/23/2023    CO2 26 01/23/2023    CREATININE 0 75 01/23/2023    EGFR 95 01/23/2023    GLUC 95 11/15/2022    GLUF 80 01/23/2023    K 4 1 01/23/2023     02/26/2015    PROT 7 5 02/26/2015    SODIUM 139 01/23/2023    TBILI 0 61 01/23/2023    TP 6 8 01/23/2023     Lab Results   Component Value Date    HGBA1C 5 1 06/30/2022     Lab Results   Component Value Date    BHA5RXZCQNBF 2 910 01/23/2023     Lab Results   Component Value Date    CHOLESTEROL 161 06/30/2022     Lab Results   Component Value Date    HDL 75 06/30/2022     Lab Results   Component Value Date    TRIG 89 06/30/2022     Lab Results   Component Value Date    LDLCALC 68 06/30/2022

## 2023-06-19 ENCOUNTER — PATIENT MESSAGE (OUTPATIENT)
Dept: BARIATRICS | Facility: CLINIC | Age: 48
End: 2023-06-19

## 2023-06-19 DIAGNOSIS — E66.01 OBESITY, CLASS III, BMI 40-49.9 (MORBID OBESITY) (HCC): Primary | ICD-10-CM

## 2023-06-19 NOTE — TELEPHONE ENCOUNTER
Received approval on Wegovy 1 7 mg  Pharmacy was notified and they are ordering in medication  Patient copay $25  Patient notified via [address] [place] [address] [phone]

## 2023-07-07 NOTE — PROGRESS NOTES
Hematology/Oncology Outpatient Follow- up Note  Tiffany Pa 1975, [id number]  1/24/2023        Chief Complaint   Patient presents with   • Follow-up       HPI:  Tiffany Pa is a 56year old female with a history of psoriatic arthritis on Enbrel, Nicol-En-Y Gastric Bypass in 2005 who was referred to hematology by her Bariatric team for evaluation of a low serum ferritin in August 2022  At presentation, patient had no evidence of anemia  Her serum ferritin was low at 7  She was treated with a therapeutic course of IV Venofer    Previous Hematologic/ Oncologic History:    IV Venofer 200 mg x 6 weekly doses    Current Hematologic/ Oncologic Treatment:    Observation      Interval History: Patient returns for follow-up visit  She completed iron infusions on 9/30/2022  She tolerated iron infusions well  This did result in resolution of her iron deficiency  Most recent blood work completed on 1/23/2023 is normal   Hemoglobin 13, hematocrit 42 6, MCV 99  White count, platelet count and differential are normal   Serum ferritin 96    She was recently started on Semaglutide for weight loss  She is following with weight management  She is also following with ophthalmology  She had eye surgery on her right eye to correct a hole in her retina in September  She continues to have central vision loss of her right eye  This has been stable for the last several months  Overall, she states she is at her baseline today  Denies any constitutional symptoms or concerning symptoms today        Test Results:    Imaging: No results found      Labs:   Lab Results   Component Value Date    WBC 5 46 01/23/2023    HGB 13 0 01/23/2023    HCT 42 6 01/23/2023    MCV 99 (H) 01/23/2023     01/23/2023     Lab Results   Component Value Date     02/26/2015    K 4 1 01/23/2023     01/23/2023    CO2 26 01/23/2023    ANIONGAP 6 02/26/2015    BUN 11 01/23/2023    CREATININE 0 75 01/23/2023    GLUCOSE 101 02/26/2015    GLUF 80 01/23/2023    CALCIUM 8 6 01/23/2023    CORRECTEDCA 9 7 06/30/2022    AST 23 01/23/2023    ALT 38 01/23/2023    ALKPHOS 37 (L) 01/23/2023    PROT 7 5 02/26/2015    BILITOT 0 4 02/26/2015    EGFR 95 01/23/2023           Review of Systems   All other systems reviewed and are negative  Active Problems:   Patient Active Problem List   Diagnosis   • Encounter for surgical aftercare following surgery of digestive system   • Postsurgical malabsorption   • Psoriatic arthritis ([facility] [address] )   • Annual physical exam   • Low ferritin   • Preop examination   • BMI 45 0-49 9, adult ([facility] [address] )   • Morbid obesity ([facility] [address] )       Past Medical History:   Past Medical History:   Diagnosis Date   • Arthritis    • Asthma    • GERD (gastroesophageal reflux disease)    • Obesity        Surgical History:   Past Surgical History:   Procedure Laterality Date   • GASTRIC BYPASS     • PARS PLANA VITRECTOMY W/ REPAIR OF MACULAR HOLE Right 09/2022       Family History:    Family History   Problem Relation Age of Onset   • Breast cancer Mother 58   • Heart disease Father    • No Known Problems Sister    • No Known Problems Daughter    • No Known Problems Maternal Grandmother    • Diabetes Maternal Grandfather    • No Known Problems Paternal Grandmother    • No Known Problems Paternal Grandfather    • No Known Problems Brother    • No Known Problems Son    • No Known Problems Maternal Uncle    • No Known Problems Paternal Aunt        Cancer-related family history includes Breast cancer (age of onset: 58) in her mother      Social History:   Social History     Socioeconomic History   • Marital status: /Civil Union     Spouse name: Not on file   • Number of children: Not on file   • Years of education: Not on file   • Highest education level: Not on file   Occupational History   • Not on file   Tobacco Use   • Smoking status: Never   • Smokeless tobacco: Never   Vaping Use   • Vaping Use: Never used   Substance and Sexual Activity • Alcohol use: Yes     Comment: socially   • Drug use: No   • Sexual activity: Yes   Other Topics Concern   • Not on file   Social History Narrative    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[employer])    Works from home    Feels safe at home and her job, same place    Has good access to transportation, food, medicine    Patient has carbon monoxide and smoke detector in the house    No firearms in the house    Always wears a seatbelt    No concern for sparse abuse     Social Determinants of Health     Financial Resource Strain: Not on file   Food Insecurity: Not on file   Transportation Needs: Not on file   Physical Activity: Not on file   Stress: Not on file   Social Connections: Not on file   Intimate Partner Violence: Not on file   Housing Stability: Not on file       Current Medications:   Current Outpatient Medications   Medication Sig Dispense Refill   • albuterol (PROVENTIL HFA,VENTOLIN HFA) 90 mcg/act inhaler Inhale 2 puffs every 6 (six) hours as needed     • calcium citrate (CALCITRATE) 950 (200 Ca) MG tablet Take 1 tablet by mouth daily     • cyanocobalamin (VITAMIN B-12) 100 mcg tablet      • Docusate Calcium (STOOL SOFTENER PO) Take by mouth     • ENBREL SURECLICK 50 MG/ML injection      • Multiple Vitamin (MULTI-VITAMIN DAILY) TABS Take by mouth     • Semaglutide-Weight Management (WEGOVY) 0 25 MG/0 5ML Inject 0 5 mL (0 25 mg total) under the skin once a week 2 mL 0   • VITAMIN D PO Take by mouth     • ferrous sulfate 325 (65 FE) MG EC tablet Take 325 mg by mouth 3 (three) times a day with meals (Patient not taking: Reported on 9/7/2022)     • Daisy 0 25-35 MG-MCG per tablet TAKE 1 TABLET BY MOUTH EVERY DAY (Patient not taking: Reported on 1/5/2023) 84 tablet 3   • PEDIATRIC MULTIVITAMINS-IRON PO  (Patient not taking: Reported on 8/4/2022)       No current facility-administered medications for this visit         Allergies: No Known Allergies    Physical Exam:  /76 (BP Location: Left arm, Patient Position: Sitting, Cuff Size: Large)   Pulse 55   Temp 97 6 °F (36 4 °C)   Resp 14   Ht 5' (1 524 m)   Wt 109 kg (240 lb)   SpO2 99%   BMI 46 87 kg/m²   Body surface area is 2 02 meters squared  Wt Readings from Last 3 Encounters:   01/24/23 109 kg (240 lb)   01/05/23 106 kg (234 lb 1 6 oz)   12/05/22 106 kg (233 lb 3 2 oz)           Physical Exam  Constitutional:       General: She is not in acute distress  Appearance: Normal appearance  HENT:      Head: Normocephalic and atraumatic  Eyes:      General: No scleral icterus  Right eye: No discharge  Left eye: No discharge  Conjunctiva/sclera: Conjunctivae normal    Cardiovascular:      Rate and Rhythm: Normal rate and regular rhythm  Pulmonary:      Effort: Pulmonary effort is normal  No respiratory distress  Breath sounds: Normal breath sounds  Abdominal:      General: Bowel sounds are normal  There is no distension  Palpations: Abdomen is soft  There is no mass  Tenderness: There is no abdominal tenderness  Musculoskeletal:         General: Normal range of motion  Lymphadenopathy:      Cervical: No cervical adenopathy  Upper Body:      Right upper body: No supraclavicular, axillary or pectoral adenopathy  Left upper body: No supraclavicular, axillary or pectoral adenopathy  Skin:     General: Skin is warm and dry  Neurological:      General: No focal deficit present  Mental Status: She is alert and oriented to person, place, and time  Psychiatric:         Mood and Affect: Mood normal          Behavior: Behavior normal          Assessment / Plan:    1  Low ferritin    2  Postsurgical malabsorption    Patient is a 52-year-old female with a history of psoriatic arthritis on Enbrel, Nicol-En-Y Gastric Bypass in 2005 who was referred to hematology by her bariatric team for evaluation of a low serum ferritin  At presentation, she had no evidence of anemia  However she did have symptoms of fatigue so she was treated with a therapeutic course of IV Venofer  She completed 6 doses of IV Venofer in September 2022  This did result in great improvement in her serum ferritin  She has no evidence of anemia or iron deficiency on current blood work  She denies any concerning symptoms today  Due to history of gastric bypass, she is at risk for recurrent iron deficiency  We will monitor her counts  I will see her back in 6 months with repeat blood work to include CBC, CMP, iron panel, MMA  Patient was in agreement with this plan of care  She is instructed to call at anytime with questions or concerns      Barriers: None  Patient  able to self care  Portions of the record may have been created with voice recognition software  Occasional wrong word or "sound a like" substitutions may have occurred due to the inherent limitations of voice recognition software  Read the chart carefully and recognize, using context, where substitutions have occurred  Klisyri Counseling:  I discussed with the patient the risks of Klisyri including but not limited to erythema, scaling, itching, weeping, crusting, and pain.

## 2023-08-15 ENCOUNTER — PROBLEM (OUTPATIENT)
Dept: URBAN - METROPOLITAN AREA CLINIC 6 | Facility: CLINIC | Age: 48
End: 2023-08-15

## 2023-08-15 DIAGNOSIS — H25.12: ICD-10-CM

## 2023-08-15 DIAGNOSIS — L71.9: ICD-10-CM

## 2023-08-15 DIAGNOSIS — H04.122: ICD-10-CM

## 2023-08-15 PROCEDURE — 92014 COMPRE OPH EXAM EST PT 1/>: CPT

## 2023-08-15 ASSESSMENT — TONOMETRY
OS_IOP_MMHG: 15
OD_IOP_MMHG: 11

## 2023-08-15 ASSESSMENT — VISUAL ACUITY
OD_CC: 20/400
OS_CC: 20/25

## 2023-08-16 ENCOUNTER — APPOINTMENT (OUTPATIENT)
Dept: LAB | Facility: MEDICAL CENTER | Age: 48
End: 2023-08-16
Payer: COMMERCIAL

## 2023-08-16 DIAGNOSIS — K91.2 POSTSURGICAL MALABSORPTION: ICD-10-CM

## 2023-08-16 DIAGNOSIS — R79.0 LOW FERRITIN: ICD-10-CM

## 2023-08-16 LAB
ALBUMIN SERPL BCP-MCNC: 3.8 G/DL (ref 3.5–5)
ALP SERPL-CCNC: 38 U/L (ref 46–116)
ALT SERPL W P-5'-P-CCNC: 26 U/L (ref 12–78)
ANION GAP SERPL CALCULATED.3IONS-SCNC: 3 MMOL/L
AST SERPL W P-5'-P-CCNC: 13 U/L (ref 5–45)
BASOPHILS # BLD AUTO: 0.06 THOUSANDS/ÂΜL (ref 0–0.1)
BASOPHILS NFR BLD AUTO: 1 % (ref 0–1)
BILIRUB SERPL-MCNC: 0.65 MG/DL (ref 0.2–1)
BUN SERPL-MCNC: 15 MG/DL (ref 5–25)
CALCIUM SERPL-MCNC: 9.4 MG/DL (ref 8.3–10.1)
CHLORIDE SERPL-SCNC: 110 MMOL/L (ref 96–108)
CO2 SERPL-SCNC: 27 MMOL/L (ref 21–32)
CREAT SERPL-MCNC: 0.77 MG/DL (ref 0.6–1.3)
EOSINOPHIL # BLD AUTO: 0.31 THOUSAND/ÂΜL (ref 0–0.61)
EOSINOPHIL NFR BLD AUTO: 5 % (ref 0–6)
ERYTHROCYTE [DISTWIDTH] IN BLOOD BY AUTOMATED COUNT: 12.4 % (ref 11.6–15.1)
FERRITIN SERPL-MCNC: 88 NG/ML (ref 11–307)
GFR SERPL CREATININE-BSD FRML MDRD: 92 ML/MIN/1.73SQ M
GLUCOSE P FAST SERPL-MCNC: 95 MG/DL (ref 65–99)
HCT VFR BLD AUTO: 44.8 % (ref 34.8–46.1)
HGB BLD-MCNC: 14.4 G/DL (ref 11.5–15.4)
IMM GRANULOCYTES # BLD AUTO: 0.01 THOUSAND/UL (ref 0–0.2)
IMM GRANULOCYTES NFR BLD AUTO: 0 % (ref 0–2)
IRON SATN MFR SERPL: 32 % (ref 15–50)
IRON SERPL-MCNC: 102 UG/DL (ref 50–170)
LYMPHOCYTES # BLD AUTO: 1.95 THOUSANDS/ÂΜL (ref 0.6–4.47)
LYMPHOCYTES NFR BLD AUTO: 31 % (ref 14–44)
MCH RBC QN AUTO: 30.8 PG (ref 26.8–34.3)
MCHC RBC AUTO-ENTMCNC: 32.1 G/DL (ref 31.4–37.4)
MCV RBC AUTO: 96 FL (ref 82–98)
MONOCYTES # BLD AUTO: 0.49 THOUSAND/ÂΜL (ref 0.17–1.22)
MONOCYTES NFR BLD AUTO: 8 % (ref 4–12)
NEUTROPHILS # BLD AUTO: 3.58 THOUSANDS/ÂΜL (ref 1.85–7.62)
NEUTS SEG NFR BLD AUTO: 55 % (ref 43–75)
NRBC BLD AUTO-RTO: 0 /100 WBCS
PLATELET # BLD AUTO: 235 THOUSANDS/UL (ref 149–390)
PMV BLD AUTO: 14.2 FL (ref 8.9–12.7)
POTASSIUM SERPL-SCNC: 4.5 MMOL/L (ref 3.5–5.3)
PROT SERPL-MCNC: 7.1 G/DL (ref 6.4–8.4)
RBC # BLD AUTO: 4.67 MILLION/UL (ref 3.81–5.12)
SODIUM SERPL-SCNC: 140 MMOL/L (ref 135–147)
TIBC SERPL-MCNC: 315 UG/DL (ref 250–450)
WBC # BLD AUTO: 6.4 THOUSAND/UL (ref 4.31–10.16)

## 2023-08-16 PROCEDURE — 85025 COMPLETE CBC W/AUTO DIFF WBC: CPT

## 2023-08-16 PROCEDURE — 83918 ORGANIC ACIDS TOTAL QUANT: CPT

## 2023-08-16 PROCEDURE — 80053 COMPREHEN METABOLIC PANEL: CPT

## 2023-08-16 PROCEDURE — 82728 ASSAY OF FERRITIN: CPT

## 2023-08-16 PROCEDURE — 36415 COLL VENOUS BLD VENIPUNCTURE: CPT

## 2023-08-16 PROCEDURE — 83540 ASSAY OF IRON: CPT

## 2023-08-16 PROCEDURE — 83550 IRON BINDING TEST: CPT

## 2023-08-21 LAB — METHYLMALONATE SERPL-SCNC: 149 NMOL/L (ref 0–378)

## 2023-08-23 ENCOUNTER — OFFICE VISIT (OUTPATIENT)
Dept: HEMATOLOGY ONCOLOGY | Facility: CLINIC | Age: 48
End: 2023-08-23
Payer: COMMERCIAL

## 2023-08-23 VITALS
RESPIRATION RATE: 16 BRPM | HEART RATE: 53 BPM | TEMPERATURE: 97.5 F | HEIGHT: 60 IN | WEIGHT: 224 LBS | OXYGEN SATURATION: 95 % | SYSTOLIC BLOOD PRESSURE: 112 MMHG | BODY MASS INDEX: 43.98 KG/M2 | DIASTOLIC BLOOD PRESSURE: 70 MMHG

## 2023-08-23 DIAGNOSIS — Z98.84 HX OF GASTRIC BYPASS: ICD-10-CM

## 2023-08-23 DIAGNOSIS — K95.89 IRON DEFICIENCY ANEMIA FOLLOWING BARIATRIC SURGERY: Primary | ICD-10-CM

## 2023-08-23 DIAGNOSIS — D50.8 IRON DEFICIENCY ANEMIA FOLLOWING BARIATRIC SURGERY: Primary | ICD-10-CM

## 2023-08-23 DIAGNOSIS — K91.2 POSTSURGICAL MALABSORPTION: ICD-10-CM

## 2023-08-23 PROCEDURE — 99214 OFFICE O/P EST MOD 30 MIN: CPT

## 2023-08-23 NOTE — PROGRESS NOTES
[facility] [place] [address]  [facility] [id number]  Hematology Ambulatory Follow-Up  Lisa Fernandes, 1975, [id number]  8/23/2023    Assessment/Plan:  1. Iron deficiency anemia following bariatric surgery  2. Postsurgical malabsorption  3. Hx of gastric bypass  Ms. Vincent Wagoner is a 45-year-old female seen in follow-up/transfer of care for iron deficiency anemia. She has iron deficiency secondary to postsurgical malabsorption after Nicol-en-Y bariatric surgery in 2005. She previously received IV Venofer 200 mg x 6 doses in 2022 and has been able to maintain her iron levels adequately. She has never had anemia associated with this. She also has no symptoms of iron deficiency associated with this either. She continues to take a multivitamin daily along with sublingual B12 supplementation. We reviewed her labs that are adequate and required no further supplementation at this time. She will continue her current vitamin regiment and see me back in the office in 6 months with repeat labs. She knows to call the office with any new or worsening symptoms for sooner evaluation prior to this follow-up. - CBC and differential; Future  - Folate; Future  - Vitamin B12; Future  - Methylmalonic acid, serum; Future  - Iron Panel (Includes Ferritin, Iron Sat%, Iron, and TIBC); Future      The patient is scheduled for follow-up in approximately 6 months (virtual). Patient voiced agreement and understanding to the above. Patient knows to call the Hematology/Oncology office with any questions and concerns regarding the above.       Barrier(s) to care: None  The patient is able to self care.  ------------------------------------------------------------------------------------------------------    Chief Complaint   Patient presents with   • Follow-up       History of present illness:   Boris Kinsey is a 45-year-old female with history of psoriatic arthritis, Nicol-en-Y bariatric surgery in 2005. She is seen in follow-up/transfer of care from [facility] for iron deficiency anemia. She was found to be iron deficient with a ferritin of 7 with no evidence of anemia in 2022. She received 6 doses of IV Venofer 200 mg and tolerated this well without significant side effects. Since then she has been able to maintain her iron adequately without needing subsequent doses of IV iron. 8/16/2023: Hemoglobin 14.4, MCV 96, platelets 240, WBC 6.4   Ferritin 88, iron saturation 32%, TIBC 315, serum iron 102    Interval history: She continues to take her bariatric multivitamin daily along with sublingual B12 supplement. She was taken off OCP in October 2022 and her period has been coming more regularly lasting only 2 days. Otherwise she has no signs and symptoms of chronic blood loss. She has no signs and symptoms of any iron deficiency. We discussed that her labs remain adequate. She has no other complaints or concerns today. Review of Systems   Constitutional: Negative for activity change, appetite change, fatigue, fever and unexpected weight change. HENT: Negative. Eyes: Negative for photophobia and visual disturbance. Respiratory: Negative. Cardiovascular: Negative. Gastrointestinal: Negative. Endocrine: Negative for cold intolerance and heat intolerance. Genitourinary: Negative for dysuria, hematuria and urgency. Musculoskeletal: Negative for arthralgias, back pain, gait problem, joint swelling and myalgias. Skin: Negative for pallor and rash. Neurological: Negative for dizziness, light-headedness and headaches. Hematological: Negative for adenopathy. Does not bruise/bleed easily. Psychiatric/Behavioral: Negative for confusion and sleep disturbance.        Patient Active Problem List   Diagnosis   • Encounter for surgical aftercare following surgery of digestive system   • Postsurgical malabsorption   • Psoriatic arthritis ([address])   • Annual physical exam   • Low ferritin   • Preop examination   • BMI 40.0-44.9, adult (HCC)   • Morbid obesity (HCC)   • Hx of gastric bypass       Past Medical History:   Diagnosis Date   • Arthritis    • Asthma    • GERD (gastroesophageal reflux disease)    • Obesity        Past Surgical History:   Procedure Laterality Date   • CATARACT EXTRACTION Right 02/2023   • GASTRIC BYPASS     • PARS PLANA VITRECTOMY W/ REPAIR OF MACULAR HOLE Right 09/2022       Family History   Problem Relation Age of Onset   • Breast cancer Mother 58   • Heart disease Father    • No Known Problems Sister    • No Known Problems Brother    • Insulin resistance Daughter    • Insulin resistance Son    • No Known Problems Maternal Uncle    • No Known Problems Paternal Aunt    • No Known Problems Maternal Grandmother    • Diabetes Maternal Grandfather    • No Known Problems Paternal Grandmother    • No Known Problems Paternal Grandfather        Social History     Socioeconomic History   • Marital status: /Civil Union     Spouse name: None   • Number of children: None   • Years of education: None   • Highest education level: None   Occupational History   • None   Tobacco Use   • Smoking status: Never   • Smokeless tobacco: Never   Vaping Use   • Vaping Use: Never used   Substance and Sexual Activity   • Alcohol use: Yes     Comment: socially   • Drug use: No   • Sexual activity: Yes     Partners: Male     Birth control/protection: None, Male Sterilization   Other Topics Concern   • None   Social History Narrative    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companies)    Works from home    Feels safe at home and her job, same place    Has good access to transportation, food, medicine    Patient has carbon monoxide and smoke detector in the house    No firearms in the house    Always wears a seatbelt    No concern for sparse abuse     Social Determinants of Health     Financial Resource Strain: Not on file Food Insecurity: Not on file   Transportation Needs: Not on file   Physical Activity: Not on file   Stress: Not on file   Social Connections: Not on file   Intimate Partner Violence: Not on file   Housing Stability: Not on file         Current Outpatient Medications:   •  albuterol (PROVENTIL HFA,VENTOLIN HFA) 90 mcg/act inhaler, Inhale 2 puffs every 6 (six) hours as needed, Disp: , Rfl:   •  calcium citrate (CALCITRATE) 950 (200 Ca) MG tablet, Take 1 tablet by mouth daily, Disp: , Rfl:   •  cyanocobalamin (VITAMIN B-12) 100 mcg tablet, , Disp: , Rfl:   •  Docusate Calcium (STOOL SOFTENER PO), Take by mouth, Disp: , Rfl:   •  ENBREL SURECLICK 50 MG/ML injection, , Disp: , Rfl:   •  Multiple Vitamin (MULTI-VITAMIN DAILY) TABS, Take by mouth, Disp: , Rfl:   •  Semaglutide-Weight Management (WEGOVY) 2.4 MG/0.75ML, Inject 0.75 mL (2.4 mg total) under the skin once a week, Disp: 3 mL, Rfl: 1  •  VITAMIN D PO, Take by mouth, Disp: , Rfl:     No Known Allergies    Objective:  /70 (BP Location: Left arm, Patient Position: Sitting, Cuff Size: Large)   Pulse (!) 53   Temp 97.5 °F (36.4 °C) (Temporal)   Resp 16   Ht 5' (1.524 m)   Wt 102 kg (224 lb)   SpO2 95%   BMI 43.75 kg/m²    Physical Exam  Constitutional:       General: She is not in acute distress. Appearance: Normal appearance. She is not ill-appearing. HENT:      Head: Normocephalic and atraumatic. Eyes:      Extraocular Movements: Extraocular movements intact. Conjunctiva/sclera: Conjunctivae normal.      Pupils: Pupils are equal, round, and reactive to light. Cardiovascular:      Rate and Rhythm: Normal rate. Pulses: Normal pulses. Pulmonary:      Effort: Pulmonary effort is normal. No respiratory distress. Abdominal:      General: Abdomen is flat. Bowel sounds are normal. There is no distension. Palpations: Abdomen is soft. Tenderness: There is no abdominal tenderness.    Musculoskeletal:         General: Normal range of motion. Cervical back: Normal range of motion. No tenderness. Right lower leg: No edema. Left lower leg: No edema. Lymphadenopathy:      Cervical: No cervical adenopathy. Skin:     General: Skin is warm and dry. Capillary Refill: Capillary refill takes less than 2 seconds. Coloration: Skin is not jaundiced or pale. Neurological:      General: No focal deficit present. Mental Status: She is alert and oriented to person, place, and time. Mental status is at baseline. Motor: No weakness. Gait: Gait normal.   Psychiatric:         Mood and Affect: Mood normal.         Behavior: Behavior normal.         Thought Content:  Thought content normal.         Judgment: Judgment normal.         Result Review  Labs:  Appointment on 08/16/2023   Component Date Value Ref Range Status   • WBC 08/16/2023 6.40  4.31 - 10.16 Thousand/uL Final   • RBC 08/16/2023 4.67  3.81 - 5.12 Million/uL Final   • Hemoglobin 08/16/2023 14.4  11.5 - 15.4 g/dL Final   • Hematocrit 08/16/2023 44.8  34.8 - 46.1 % Final   • MCV 08/16/2023 96  82 - 98 fL Final   • MCH 08/16/2023 30.8  26.8 - 34.3 pg Final   • MCHC 08/16/2023 32.1  31.4 - 37.4 g/dL Final   • RDW 08/16/2023 12.4  11.6 - 15.1 % Final   • MPV 08/16/2023 14.2 (H)  8.9 - 12.7 fL Final   • Platelets 85/88/6454 235  149 - 390 Thousands/uL Final   • nRBC 08/16/2023 0  /100 WBCs Final   • Neutrophils Relative 08/16/2023 55  43 - 75 % Final   • Immat GRANS % 08/16/2023 0  0 - 2 % Final   • Lymphocytes Relative 08/16/2023 31  14 - 44 % Final   • Monocytes Relative 08/16/2023 8  4 - 12 % Final   • Eosinophils Relative 08/16/2023 5  0 - 6 % Final   • Basophils Relative 08/16/2023 1  0 - 1 % Final   • Neutrophils Absolute 08/16/2023 3.58  1.85 - 7.62 Thousands/µL Final   • Immature Grans Absolute 08/16/2023 0.01  0.00 - 0.20 Thousand/uL Final   • Lymphocytes Absolute 08/16/2023 1.95  0.60 - 4.47 Thousands/µL Final   • Monocytes Absolute 08/16/2023 0.49  0.17 - 1.22 Thousand/µL Final   • Eosinophils Absolute 08/16/2023 0.31  0.00 - 0.61 Thousand/µL Final   • Basophils Absolute 08/16/2023 0.06  0.00 - 0.10 Thousands/µL Final   • Sodium 08/16/2023 140  135 - 147 mmol/L Final   • Potassium 08/16/2023 4.5  3.5 - 5.3 mmol/L Final   • Chloride 08/16/2023 110 (H)  96 - 108 mmol/L Final   • CO2 08/16/2023 27  21 - 32 mmol/L Final   • ANION GAP 08/16/2023 3  mmol/L Final   • BUN 08/16/2023 15  5 - 25 mg/dL Final   • Creatinine 08/16/2023 0.77  0.60 - 1.30 mg/dL Final    Standardized to IDMS reference method   • Glucose, Fasting 08/16/2023 95  65 - 99 mg/dL Final    Specimen collection should occur prior to Sulfasalazine administration due to the potential for falsely depressed results. Specimen collection should occur prior to Sulfapyridine administration due to the potential for falsely elevated results. • Calcium 08/16/2023 9.4  8.3 - 10.1 mg/dL Final   • AST 08/16/2023 13  5 - 45 U/L Final    Specimen collection should occur prior to Sulfasalazine administration due to the potential for falsely depressed results. • ALT 08/16/2023 26  12 - 78 U/L Final    Specimen collection should occur prior to Sulfasalazine and/or Sulfapyridine administration due to the potential for falsely depressed results. • Alkaline Phosphatase 08/16/2023 38 (L)  46 - 116 U/L Final   • Total Protein 08/16/2023 7.1  6.4 - 8.4 g/dL Final   • Albumin 08/16/2023 3.8  3.5 - 5.0 g/dL Final   • Total Bilirubin 08/16/2023 0.65  0.20 - 1.00 mg/dL Final    Use of this assay is not recommended for patients undergoing treatment with eltrombopag due to the potential for falsely elevated results. • eGFR 08/16/2023 92  ml/min/1.73sq m Final   • Methylmalonic Acid, S 08/16/2023 149  0 - 378 nmol/L Final   • Iron Saturation 08/16/2023 32  15 - 50 % Final   • TIBC 08/16/2023 315  250 - 450 ug/dL Final   • Iron 08/16/2023 102  50 - 170 ug/dL Final    Patients treated with metal-binding drugs (ie.  Deferoxamine) may have depressed iron values. • Ferritin 08/16/2023 88  11 - 307 ng/mL Final       Imaging:   No relevant imaging to review     Please note: This report has been generated by a voice recognition software system. Therefore there may be syntax, spelling, and/or grammatical errors. Please call if you have any questions.

## 2023-09-11 ENCOUNTER — TELEPHONE (OUTPATIENT)
Dept: BARIATRICS | Facility: CLINIC | Age: 48
End: 2023-09-11

## 2023-09-11 NOTE — TELEPHONE ENCOUNTER
Patient called when she gave herself Wegovy shot last night when she pulled out the needle all the medication leaked out of her stomach. Patient is asking if she should take another shot or wait until the next one is due. Please advise.

## 2023-09-13 ENCOUNTER — OFFICE VISIT (OUTPATIENT)
Dept: BARIATRICS | Facility: CLINIC | Age: 48
End: 2023-09-13
Payer: COMMERCIAL

## 2023-09-13 VITALS
HEIGHT: 60 IN | WEIGHT: 218 LBS | HEART RATE: 60 BPM | BODY MASS INDEX: 42.8 KG/M2 | DIASTOLIC BLOOD PRESSURE: 80 MMHG | RESPIRATION RATE: 14 BRPM | SYSTOLIC BLOOD PRESSURE: 118 MMHG

## 2023-09-13 DIAGNOSIS — E66.01 MORBID OBESITY (HCC): Primary | ICD-10-CM

## 2023-09-13 DIAGNOSIS — Z98.84 HX OF GASTRIC BYPASS: ICD-10-CM

## 2023-09-13 DIAGNOSIS — E66.01 OBESITY, CLASS III, BMI 40-49.9 (MORBID OBESITY) (HCC): ICD-10-CM

## 2023-09-13 PROCEDURE — 99214 OFFICE O/P EST MOD 30 MIN: CPT | Performed by: FAMILY MEDICINE

## 2023-09-13 NOTE — PROGRESS NOTES
Assessment/Plan:  Farzad Tran was seen today for follow-up. Diagnoses and all orders for this visit:      Morbid obesity ([address])  Made progress on Wegovy on 2.4 mg now lost total 5.7%  No side effects continues to lose weight   -blood work reviewed normal thyroid fc  1200kcal daily  Cont dietician advice, reaches 80 g protein per day     Component Ref Range & Units 1/23/23  7:54 AM    TSH 3RD GENERATON 0.450 - 4.500 uIU/mL 2.910      Component Ref Range & Units 1/23/23  7:54 AM 6/30/22  9:03 AM   Vit D, 25-Hydroxy 30.0 - 100.0 ng/mL 31.9  27.6 Low      Component Ref Range & Units 1/23/23  7:54 AM 6/30/22  9:03 AM   Iron Saturation 15 - 50 % 33  13 Low     TIBC 250 - 450 ug/dL 300  947 High     Iron 50 - 170 ug/dL 99  127 CM          Weight not at goal  Nutrition prescription:  Calorie goal: 1200kcal  Encourage mindful eating, portion control, motivational interview performed to help patient reach goals   Patient denies personal and family history of  pancreatitis, thyroid cancer, MEN-2 tumors. Denies any hx of glaucoma, seizures, kidney stones, gallstones. Denies Hx of CAD, PAD, palpitations, arrhythmia. Denies uncontrolled anxiety or depression, suicidal ideation or behavior, insomnia or sleep disturbance. Hx of gastric bypass  continue to reach protein goal at least 60 g daily and 64 oz fluids  Irregular periods  Barrier to weight loss  Off BCP    Subjective:   Chief Complaint   Patient presents with   • Follow-up     Patient presents for f/u and reports No side effects. Patient here to discuss weight associated problems and nutrition goals  HPI: Sima Winchester  is a 56 y.o. female with excess weight/obesity here to pursue weight management. Patient is pursuing [facility].   Most recent notes and records were reviewed.   Initial weight loss goal of 5-10% weight loss for improved health  Wt Readings from Last 10 Encounters:   09/13/23 98.9 kg (218 lb)   08/23/23 102 kg (224 lb)   06/14/23 101 kg (222 lb 12.8 oz)   05/31/23 102 kg (225 lb)   05/11/23 99.9 kg (220 lb 3.2 oz)   04/11/23 105 kg (230 lb 6.4 oz)   03/22/23 104 kg (228 lb 8 oz)   03/07/23 106 kg (234 lb)   02/09/23 107 kg (236 lb)   02/08/23 107 kg (235 lb)     Hx of gastric bypass in 2005 lost 135lbs, kept it off long time   Weight before surgery 332lbs   Initial weight:in weight management 244lbs   236lbs Wegovy at 0.25mg   Current weight:218lbs Wegovy at 2.4 mg no side effects  has intestinal gas but tolerable no constipation   REE: 1,728 kcal   She goes through perimenopause, gained about 30lbs last year     Food logging: yes 1100kcal-1300kcal  Increased appetite/cravings:no  Sees dietician did metabolic testing doing well   Has 3 meals per day discussed with dietician foods  Exercise: restarted walking , treadmill , sometimes weights  Hydration: 90oz water  Alcohol: rare twice a mo  Sleep: ok        The following portions of the patient's history were reviewed and updated as appropriate: allergies, current medications, past family history, past medical history, past social history, past surgical history, and problem list.      Review of Systems   Constitutional: Negative for activity change. Fatigue  HENT: Negative for trouble swallowing. Respiratory: Negative for shortness of breath. Cardiovascular: Negative for chest pain, edema  Gastrointestinal: Negative for abdominal pain, nausea and vomiting, acid reflux, constipation/diarrhea  Psychiatric/Behavioral: Negative for behavioral problems , anxiety or depression    Objective:  /80 (BP Location: Left arm, Patient Position: Sitting, Cuff Size: Large)   Pulse 60   Resp 14   Ht 5' (1.524 m)   Wt 98.9 kg (218 lb)   BMI 42.58 kg/m²   Constitutional: Well-developed, well-nourished and Obese Body mass index is 42.58 kg/m². Mikala Hall HEENT: No conjunctival pallor or jaundice  Pulmonary: No increased work of breathing or signs of respiratory distress.   CV: Well-perfused, Normal rate Vascular: no peripheral edema  GI: Abdomen obese, Non-distended  MSK: no sarcopenia noted   Neuro: Oriented to person, place and time. Normal Speech  Psych: Normal affect and mood. Normal thought process, no delusions   Labs and Imaging  Recent labs and imaging have been personally reviewed.   Lab Results   Component Value Date    WBC 6.40 08/16/2023    HGB 14.4 08/16/2023    HCT 44.8 08/16/2023    MCV 96 08/16/2023     08/16/2023     Lab Results   Component Value Date     02/26/2015    SODIUM 140 08/16/2023    K 4.5 08/16/2023     (H) 08/16/2023    CO2 27 08/16/2023    ANIONGAP 6 02/26/2015    AGAP 3 08/16/2023    BUN 15 08/16/2023    CREATININE 0.77 08/16/2023    GLUC 95 11/15/2022    GLUF 95 08/16/2023    CALCIUM 9.4 08/16/2023    AST 13 08/16/2023    ALT 26 08/16/2023    ALKPHOS 38 (L) 08/16/2023    PROT 7.5 02/26/2015    TP 7.1 08/16/2023    BILITOT 0.4 02/26/2015    TBILI 0.65 08/16/2023    EGFR 92 08/16/2023     Lab Results   Component Value Date    HGBA1C 5.1 06/30/2022     Lab Results   Component Value Date    JSA2YVQBYWQJ 2.910 01/23/2023     Lab Results   Component Value Date    CHOLESTEROL 161 06/30/2022     Lab Results   Component Value Date    HDL 75 06/30/2022     Lab Results   Component Value Date    TRIG 89 06/30/2022     Lab Results   Component Value Date    LDLCALC 68 06/30/2022

## 2023-11-08 ENCOUNTER — OFFICE VISIT (OUTPATIENT)
Dept: BARIATRICS | Facility: CLINIC | Age: 48
End: 2023-11-08
Payer: COMMERCIAL

## 2023-11-08 VITALS
SYSTOLIC BLOOD PRESSURE: 110 MMHG | DIASTOLIC BLOOD PRESSURE: 70 MMHG | WEIGHT: 214.6 LBS | HEIGHT: 60 IN | HEART RATE: 85 BPM | RESPIRATION RATE: 18 BRPM | BODY MASS INDEX: 42.13 KG/M2 | OXYGEN SATURATION: 97 %

## 2023-11-08 DIAGNOSIS — Z98.84 HX OF GASTRIC BYPASS: ICD-10-CM

## 2023-11-08 DIAGNOSIS — E66.01 OBESITY, CLASS III, BMI 40-49.9 (MORBID OBESITY) (HCC): Primary | ICD-10-CM

## 2023-11-08 PROCEDURE — 99214 OFFICE O/P EST MOD 30 MIN: CPT | Performed by: FAMILY MEDICINE

## 2023-11-08 NOTE — PROGRESS NOTES
Assessment/Plan:  Nikolay Perez was seen today for follow-up. Diagnoses and all orders for this visit:      Morbid obesity ([address])  Made progress on Wegovy on 2.4 mg now lost total 5.7%  No side effects continues to lose weight   -blood work reviewed normal thyroid fc  1200kcal daily  Cont dietician advice, reaches 80 g protein per day     Component Ref Range & Units 1/23/23  7:54 AM    TSH 3RD GENERATON 0.450 - 4.500 uIU/mL 2.910      Component Ref Range & Units 1/23/23  7:54 AM 6/30/22  9:03 AM   Vit D, 25-Hydroxy 30.0 - 100.0 ng/mL 31.9  27.6 Low      Component Ref Range & Units 1/23/23  7:54 AM 6/30/22  9:03 AM   Iron Saturation 15 - 50 % 33  13 Low     TIBC 250 - 450 ug/dL 300  947 High     Iron 50 - 170 ug/dL 99  127 CM          Weight not at goal  Nutrition prescription:  Calorie goal: 1200kcal  Encourage mindful eating, portion control, motivational interview performed to help patient reach goals   Patient denies personal and family history of  pancreatitis, thyroid cancer, MEN-2 tumors. Denies any hx of glaucoma, seizures, kidney stones, gallstones. Denies Hx of CAD, PAD, palpitations, arrhythmia. Denies uncontrolled anxiety or depression, suicidal ideation or behavior, insomnia or sleep disturbance. Hx of gastric bypass  continue to reach protein goal at least 60 g daily and 64 oz fluids  Irregular periods  Barrier to weight loss  Off BCP    Subjective:   Chief Complaint   Patient presents with    Follow-up     Patient here to discuss weight associated problems and nutrition goals  HPI: Pita Calderon  is a 53 y.o. female with excess weight/obesity here to pursue weight management. Patient is pursuing [facility].   Most recent notes and records were reviewed.   Initial weight loss goal of 5-10% weight loss for improved health  Wt Readings from Last 10 Encounters:   11/08/23 97.3 kg (214 lb 9.6 oz)   09/13/23 98.9 kg (218 lb)   08/23/23 102 kg (224 lb)   06/14/23 101 kg (222 lb 12.8 oz)   05/31/23 102 kg (225 lb)   05/11/23 99.9 kg (220 lb 3.2 oz)   04/11/23 105 kg (230 lb 6.4 oz)   03/22/23 104 kg (228 lb 8 oz)   03/07/23 106 kg (234 lb)   02/09/23 107 kg (236 lb)     Hx of gastric bypass in 2005 lost 135lbs, kept it off long time   Weight before surgery 332lbs   Initial weight:in weight management 244lbs   236lbs Wegovy at 0.25mg   Current weight:214lbs Wegovy at 2.4 mg no side effects  has intestinal gas but tolerable no constipation   REE: 1,728 kcal     Food logging: yes 1100kcal-1300kcal  Increased appetite/cravings:no  Sees dietician did metabolic testing doing well   Introducing protein ball in am   Has 3 meals per day discussed with dietician foods  Exercise: restarted walking , treadmill , sometimes weights  Hydration: 90oz water  Alcohol: rare twice a mo  Sleep: ok        The following portions of the patient's history were reviewed and updated as appropriate: allergies, current medications, past family history, past medical history, past social history, past surgical history, and problem list.      Review of Systems   Constitutional: Negative for activity change. Fatigue  HENT: Negative for trouble swallowing. Respiratory: Negative for shortness of breath. Cardiovascular: Negative for chest pain, edema  Gastrointestinal: Negative for abdominal pain, nausea and vomiting, acid reflux, constipation/diarrhea  Psychiatric/Behavioral: Negative for behavioral problems , anxiety or depression    Objective:  /70 (BP Location: Left arm, Patient Position: Sitting, Cuff Size: Adult)   Pulse 85   Resp 18   Ht 5' (1.524 m)   Wt 97.3 kg (214 lb 9.6 oz)   SpO2 97%   BMI 41.91 kg/m²   Constitutional: Well-developed, well-nourished and Obese Body mass index is 41.91 kg/m². Hidla Girt HEENT: No conjunctival pallor or jaundice  Pulmonary: No increased work of breathing or signs of respiratory distress.   CV: Well-perfused, Normal rate    Vascular: no peripheral edema  GI: Abdomen obese, Non-distended  MSK: no sarcopenia noted   Neuro: Oriented to person, place and time. Normal Speech  Psych: Normal affect and mood. Normal thought process, no delusions   Labs and Imaging  Recent labs and imaging have been personally reviewed.   Lab Results   Component Value Date    WBC 6.40 08/16/2023    HGB 14.4 08/16/2023    HCT 44.8 08/16/2023    MCV 96 08/16/2023     08/16/2023     Lab Results   Component Value Date     02/26/2015    SODIUM 140 08/16/2023    K 4.5 08/16/2023     (H) 08/16/2023    CO2 27 08/16/2023    ANIONGAP 6 02/26/2015    AGAP 3 08/16/2023    BUN 15 08/16/2023    CREATININE 0.77 08/16/2023    GLUC 95 11/15/2022    GLUF 95 08/16/2023    CALCIUM 9.4 08/16/2023    AST 13 08/16/2023    ALT 26 08/16/2023    ALKPHOS 38 (L) 08/16/2023    PROT 7.5 02/26/2015    TP 7.1 08/16/2023    BILITOT 0.4 02/26/2015    TBILI 0.65 08/16/2023    EGFR 92 08/16/2023     Lab Results   Component Value Date    HGBA1C 5.1 06/30/2022     Lab Results   Component Value Date    SGM6IJQSHTZY 2.910 01/23/2023     Lab Results   Component Value Date    CHOLESTEROL 161 06/30/2022     Lab Results   Component Value Date    HDL 75 06/30/2022     Lab Results   Component Value Date    TRIG 89 06/30/2022     Lab Results   Component Value Date    LDLCALC 68 06/30/2022

## 2024-01-16 ENCOUNTER — TELEPHONE (OUTPATIENT)
Dept: BARIATRICS | Facility: CLINIC | Age: 49
End: 2024-01-16

## 2024-01-16 DIAGNOSIS — E66.01 OBESITY, CLASS III, BMI 40-49.9 (MORBID OBESITY) (HCC): ICD-10-CM

## 2024-01-16 NOTE — TELEPHONE ENCOUNTER
----- Message from Freda Villasenor sent at 1/16/2024 10:23 AM EST -----  Regarding: RE: Insurance Authorization   Contact: [phone]  Could you please order a refill on Wegovy, thanks.    ----- Message -----  From: Keysha Hand  Sent: 1/16/2024   9:47 AM EST  To: [facility]  Subject: Insurance Authorization                          Hi, happy New Year. It appears my insurance needs authorization for a new year for Wegovy. I have been without the medication for two weeks now, just wanted to ensure 1-it’s ok, and 2-authorization is in progress.     Thank you,  Keysha Hand

## 2024-01-30 ENCOUNTER — TELEPHONE (OUTPATIENT)
Dept: HEMATOLOGY ONCOLOGY | Facility: CLINIC | Age: 49
End: 2024-01-30

## 2024-01-30 NOTE — TELEPHONE ENCOUNTER
Appointment Change  Cancel, Reschedule, Change to Virtual      Who are you speaking with? Patient   If it is not the patient, is the caller listed on the communication consent form? N/A   Which provider is the appointment scheduled with? MORENO Zamora   When was the original appointment scheduled?    Please list date and time 02/28/2024 @ 10AM    At which location is the appointment scheduled to take place? Virtual   Was the appointment rescheduled?     Was the appointment changed from an in person visit to a virtual visit?    If so, please list the details of the change. Yes, 03/07/2024 @ 1PM (virtual)   What is the reason for the appointment change? Provider

## 2024-02-21 ENCOUNTER — APPOINTMENT (OUTPATIENT)
Dept: LAB | Facility: MEDICAL CENTER | Age: 49
End: 2024-02-21
Payer: COMMERCIAL

## 2024-02-21 DIAGNOSIS — K91.2 POSTSURGICAL MALABSORPTION: ICD-10-CM

## 2024-02-21 DIAGNOSIS — Z98.84 HX OF GASTRIC BYPASS: ICD-10-CM

## 2024-02-21 DIAGNOSIS — K95.89 IRON DEFICIENCY ANEMIA FOLLOWING BARIATRIC SURGERY: ICD-10-CM

## 2024-02-21 DIAGNOSIS — D50.8 IRON DEFICIENCY ANEMIA FOLLOWING BARIATRIC SURGERY: ICD-10-CM

## 2024-02-21 LAB
BASOPHILS # BLD AUTO: 0.05 THOUSANDS/ÂΜL (ref 0–0.1)
BASOPHILS NFR BLD AUTO: 1 % (ref 0–1)
EOSINOPHIL # BLD AUTO: 0.18 THOUSAND/ÂΜL (ref 0–0.61)
EOSINOPHIL NFR BLD AUTO: 3 % (ref 0–6)
ERYTHROCYTE [DISTWIDTH] IN BLOOD BY AUTOMATED COUNT: 12.4 % (ref 11.6–15.1)
FERRITIN SERPL-MCNC: 64 NG/ML (ref 11–307)
FOLATE SERPL-MCNC: >22.3 NG/ML
HCT VFR BLD AUTO: 42.1 % (ref 34.8–46.1)
HGB BLD-MCNC: 13.5 G/DL (ref 11.5–15.4)
IMM GRANULOCYTES # BLD AUTO: 0.01 THOUSAND/UL (ref 0–0.2)
IMM GRANULOCYTES NFR BLD AUTO: 0 % (ref 0–2)
IRON SATN MFR SERPL: 31 % (ref 15–50)
IRON SERPL-MCNC: 92 UG/DL (ref 50–212)
LYMPHOCYTES # BLD AUTO: 1.7 THOUSANDS/ÂΜL (ref 0.6–4.47)
LYMPHOCYTES NFR BLD AUTO: 29 % (ref 14–44)
MCH RBC QN AUTO: 30.6 PG (ref 26.8–34.3)
MCHC RBC AUTO-ENTMCNC: 32.1 G/DL (ref 31.4–37.4)
MCV RBC AUTO: 96 FL (ref 82–98)
MONOCYTES # BLD AUTO: 0.47 THOUSAND/ÂΜL (ref 0.17–1.22)
MONOCYTES NFR BLD AUTO: 8 % (ref 4–12)
NEUTROPHILS # BLD AUTO: 3.4 THOUSANDS/ÂΜL (ref 1.85–7.62)
NEUTS SEG NFR BLD AUTO: 59 % (ref 43–75)
NRBC BLD AUTO-RTO: 0 /100 WBCS
PLATELET # BLD AUTO: 236 THOUSANDS/UL (ref 149–390)
PMV BLD AUTO: 13.3 FL (ref 8.9–12.7)
RBC # BLD AUTO: 4.41 MILLION/UL (ref 3.81–5.12)
TIBC SERPL-MCNC: 293 UG/DL (ref 250–450)
UIBC SERPL-MCNC: 201 UG/DL (ref 155–355)
VIT B12 SERPL-MCNC: 3231 PG/ML (ref 180–914)
WBC # BLD AUTO: 5.81 THOUSAND/UL (ref 4.31–10.16)

## 2024-02-21 PROCEDURE — 82728 ASSAY OF FERRITIN: CPT

## 2024-02-21 PROCEDURE — 82746 ASSAY OF FOLIC ACID SERUM: CPT

## 2024-02-21 PROCEDURE — 82607 VITAMIN B-12: CPT

## 2024-02-21 PROCEDURE — 83540 ASSAY OF IRON: CPT

## 2024-02-21 PROCEDURE — 83550 IRON BINDING TEST: CPT

## 2024-02-21 PROCEDURE — 36415 COLL VENOUS BLD VENIPUNCTURE: CPT

## 2024-02-21 PROCEDURE — 85025 COMPLETE CBC W/AUTO DIFF WBC: CPT

## 2024-02-21 PROCEDURE — 83918 ORGANIC ACIDS TOTAL QUANT: CPT

## 2024-02-29 LAB — METHYLMALONATE SERPL-SCNC: 213 NMOL/L (ref 0–378)

## 2024-03-07 ENCOUNTER — TELEMEDICINE (OUTPATIENT)
Dept: HEMATOLOGY ONCOLOGY | Facility: CLINIC | Age: 49
End: 2024-03-07
Payer: COMMERCIAL

## 2024-03-07 ENCOUNTER — ANNUAL EXAM (OUTPATIENT)
Dept: OBGYN CLINIC | Facility: CLINIC | Age: 49
End: 2024-03-07
Payer: COMMERCIAL

## 2024-03-07 VITALS
HEIGHT: 60 IN | SYSTOLIC BLOOD PRESSURE: 120 MMHG | DIASTOLIC BLOOD PRESSURE: 82 MMHG | WEIGHT: 223.8 LBS | BODY MASS INDEX: 43.94 KG/M2

## 2024-03-07 DIAGNOSIS — Z01.419 ENCOUNTER FOR GYNECOLOGICAL EXAMINATION WITHOUT ABNORMAL FINDING: Primary | ICD-10-CM

## 2024-03-07 DIAGNOSIS — Z01.419 PAP SMEAR, AS PART OF ROUTINE GYNECOLOGICAL EXAMINATION: ICD-10-CM

## 2024-03-07 DIAGNOSIS — D50.8 IRON DEFICIENCY ANEMIA FOLLOWING BARIATRIC SURGERY: ICD-10-CM

## 2024-03-07 DIAGNOSIS — K91.2 POSTSURGICAL MALABSORPTION: ICD-10-CM

## 2024-03-07 DIAGNOSIS — Z98.84 HX OF GASTRIC BYPASS: Primary | ICD-10-CM

## 2024-03-07 DIAGNOSIS — K95.89 IRON DEFICIENCY ANEMIA FOLLOWING BARIATRIC SURGERY: ICD-10-CM

## 2024-03-07 DIAGNOSIS — Z12.31 ENCOUNTER FOR SCREENING MAMMOGRAM FOR MALIGNANT NEOPLASM OF BREAST: ICD-10-CM

## 2024-03-07 DIAGNOSIS — R79.0 LOW FERRITIN: ICD-10-CM

## 2024-03-07 PROCEDURE — S0612 ANNUAL GYNECOLOGICAL EXAMINA: HCPCS | Performed by: OBSTETRICS & GYNECOLOGY

## 2024-03-07 PROCEDURE — G0145 SCR C/V CYTO,THINLAYER,RESCR: HCPCS | Performed by: PATHOLOGY

## 2024-03-07 PROCEDURE — 99213 OFFICE O/P EST LOW 20 MIN: CPT

## 2024-03-07 PROCEDURE — G0476 HPV COMBO ASSAY CA SCREEN: HCPCS | Performed by: OBSTETRICS & GYNECOLOGY

## 2024-03-07 NOTE — PATIENT INSTRUCTIONS
Decrease b12 supplement to 1000 mcg weekly or 500 mcg daily   Follow up with PCP annually for labs

## 2024-03-10 NOTE — PROGRESS NOTES
Assessment/Plan:    No problem-specific Assessment & Plan notes found for this encounter.       Diagnoses and all orders for this visit:    Encounter for gynecological examination without abnormal finding  -     Liquid-based pap, screening  -     HPV High Risk    Pap smear, as part of routine gynecological examination    Encounter for screening mammogram for malignant neoplasm of breast  -     Mammo screening bilateral w 3d & cad; Future          Normal gynecological physical examination.  Self-breast examination stressed.  Mammogram ordered.  Discussed regular exercise, healthy diet, importance of vitamin D and calcium supplements.  Discussed importance of sun block use during periods of prolonged sun exposure.  Patient will be seen in 1 year for routine gynecologic and medical examination.  Patient will call office for any problems, concerns, or issues which may arise during the interim.     Subjective:          HPI    Patient ID: Keysha Hand is a 48 y.o. female who presents today for her annual gynecologic and medical examination    Menstrual status: Patient reports that her menstrual cycles are still fairly normal.  She denies any significant abnormalities at this time.    Vasomotor symptoms: Denies any significant vasomotor symptoms    Patient reports normal appetite    Patient reports normal bowel and bladder habits    Patient denies any significant pelvic or abdominal pain    Patient denies any headaches, chest pain, shortness of breath fever shakes or chills    Patient denies any COVID 19 symptoms including cough or loss of taste or smell    COVID vaccine status: Patient aware of COVID vaccination protocols    Medical problems: No significant medical problems    Colonoscopy status: Aware of scheduling screening colonoscopy    Mammogram status: Patient does regular self breast examinations and is up-to-date with screening mammography.  Appropriate arrangements for her annual screening mammogram were  placed into the EMR system at today's visit.    The following portions of the patient's history were reviewed and updated as appropriate: allergies, current medications, past family history, past medical history, past social history, past surgical history and problem list.    Review of Systems   Constitutional: Negative.  Negative for appetite change, diaphoresis, fatigue, fever and unexpected weight change.   HENT: Negative.     Eyes: Negative.    Respiratory: Negative.     Cardiovascular: Negative.    Gastrointestinal: Negative.  Negative for abdominal pain, blood in stool, constipation, diarrhea, nausea and vomiting.   Endocrine: Negative.  Negative for cold intolerance and heat intolerance.   Genitourinary: Negative.  Negative for dysuria, frequency, hematuria, urgency, vaginal bleeding, vaginal discharge and vaginal pain.   Musculoskeletal: Negative.    Skin: Negative.    Allergic/Immunologic: Negative.    Neurological: Negative.    Hematological: Negative.  Negative for adenopathy.   Psychiatric/Behavioral: Negative.           Objective:      /82   Ht 5' (1.524 m)   Wt 102 kg (223 lb 12.8 oz)   LMP 03/04/2024 (Exact Date)   BMI 43.71 kg/m²          Physical Exam  Constitutional:       General: She is not in acute distress.     Appearance: Normal appearance. She is well-developed. She is not diaphoretic.   HENT:      Head: Normocephalic and atraumatic.   Eyes:      Pupils: Pupils are equal, round, and reactive to light.   Cardiovascular:      Rate and Rhythm: Normal rate and regular rhythm.      Heart sounds: Normal heart sounds. No murmur heard.     No friction rub. No gallop.   Pulmonary:      Effort: Pulmonary effort is normal.      Breath sounds: Normal breath sounds.   Chest:   Breasts:     Breasts are symmetrical.      Right: No inverted nipple, mass, nipple discharge, skin change or tenderness.      Left: No inverted nipple, mass, nipple discharge, skin change or tenderness.   Abdominal:       General: Bowel sounds are normal.      Palpations: Abdomen is soft.   Genitourinary:     General: Normal vulva.      Exam position: Supine.      Labia:         Right: No rash or lesion.         Left: No rash or lesion.       Vagina: Normal. No vaginal discharge, erythema, tenderness or bleeding.      Cervix: No discharge or friability.      Uterus: Not enlarged and not tender.       Adnexa:         Right: No mass, tenderness or fullness.          Left: No mass, tenderness or fullness.        Rectum: Normal. Guaiac result negative.   Musculoskeletal:         General: Normal range of motion.      Cervical back: Normal range of motion and neck supple.   Lymphadenopathy:      Cervical: No cervical adenopathy.      Upper Body:      Right upper body: No supraclavicular adenopathy.      Left upper body: No supraclavicular adenopathy.   Skin:     General: Skin is warm and dry.      Findings: No rash.   Neurological:      General: No focal deficit present.      Mental Status: She is alert and oriented to person, place, and time.   Psychiatric:         Mood and Affect: Mood normal.         Speech: Speech normal.         Behavior: Behavior normal.         Thought Content: Thought content normal.         Judgment: Judgment normal.

## 2024-03-11 LAB
HPV HR 12 DNA CVX QL NAA+PROBE: POSITIVE
HPV16 DNA CVX QL NAA+PROBE: NEGATIVE
HPV18 DNA CVX QL NAA+PROBE: NEGATIVE

## 2024-03-13 ENCOUNTER — OFFICE VISIT (OUTPATIENT)
Dept: BARIATRICS | Facility: CLINIC | Age: 49
End: 2024-03-13
Payer: COMMERCIAL

## 2024-03-13 VITALS
OXYGEN SATURATION: 98 % | DIASTOLIC BLOOD PRESSURE: 76 MMHG | RESPIRATION RATE: 18 BRPM | WEIGHT: 219 LBS | HEART RATE: 70 BPM | HEIGHT: 61 IN | BODY MASS INDEX: 41.35 KG/M2 | SYSTOLIC BLOOD PRESSURE: 120 MMHG

## 2024-03-13 DIAGNOSIS — E66.01 OBESITY, CLASS III, BMI 40-49.9 (MORBID OBESITY) (HCC): ICD-10-CM

## 2024-03-13 DIAGNOSIS — Z98.84 HX OF GASTRIC BYPASS: ICD-10-CM

## 2024-03-13 PROCEDURE — 99214 OFFICE O/P EST MOD 30 MIN: CPT | Performed by: FAMILY MEDICINE

## 2024-03-13 NOTE — PROGRESS NOTES
Assessment/Plan:  Keysha was seen today for follow-up.    Diagnoses and all orders for this visit:      Morbid obesity ([facility])  Made progress on Wegovy on 2.4 mg now lost total 5.7%  Was off for 1 month due to Pa process now back on  Will continue at 2.4mg- no side effects  Premenopasual    -blood work reviewed normal thyroid fc  1300kcal daily  Cont dietician advice, reaches 80 g protein per day     Component Ref Range & Units 1/23/23  7:54 AM    TSH 3RD GENERATON 0.450 - 4.500 uIU/mL 2.910      Component Ref Range & Units 1/23/23  7:54 AM 6/30/22  9:03 AM   Vit D, 25-Hydroxy 30.0 - 100.0 ng/mL 31.9  27.6 Low      Component Ref Range & Units 1/23/23  7:54 AM 6/30/22  9:03 AM   Iron Saturation 15 - 50 % 33  13 Low     TIBC 250 - 450 ug/dL 300  947 High     Iron 50 - 170 ug/dL 99  127 CM    Encourage mindful eating, portion control, motivational interview performed to help patient reach goals   Patient denies personal and family history of  pancreatitis, thyroid cancer, MEN-2 tumors.  Denies any hx of glaucoma, seizures, kidney stones, gallstones.  Denies Hx of CAD, PAD, palpitations, arrhythmia.   Denies uncontrolled anxiety or depression, suicidal ideation or behavior, insomnia or sleep disturbance.   Hx of gastric bypass  continue to reach protein goal at least 60 g daily and 64 oz fluids  Irregular periods  Barrier to weight loss, perimenopause  Off BCP    Subjective:   Chief Complaint   Patient presents with    Follow-up     Waist: 40.5  inches     Patient here to discuss weight associated problems and nutrition goals  HPI: Keysha Hand  is a 48 y.o. female with excess weight/obesity here to pursue weight management.  Patient is pursuing [facility].     Wt Readings from Last 10 Encounters:   03/13/24 99.3 kg (219 lb)   03/07/24 102 kg (223 lb 12.8 oz)   11/08/23 97.3 kg (214 lb 9.6 oz)   09/13/23 98.9 kg (218 lb)   08/23/23 102 kg (224 lb)   06/14/23 101 kg (222 lb 12.8 oz)   05/31/23 102 kg (225 lb)  "  05/11/23 99.9 kg (220 lb 3.2 oz)   04/11/23 105 kg (230 lb 6.4 oz)   03/22/23 104 kg (228 lb 8 oz)     Hx of gastric bypass in 2005 lost 135lbs, kept it off long time   Weight before surgery 332lbs   Initial weight:in weight management 244lbs   236lbs Wegovy at 0.25mg    Last weight:214lbs Wegovy at 2.4 mg no side effects  has intestinal gas but tolerable no constipation   REE: 1,728 kcal   Current 219lbs on Wegovy 2.4 mg was off Wegovy for 1 mo  Food logging: yes 1100kcal-1300kcal  Increased appetite/cravings:no  Sees dietician did metabolic testing doing well   Introducing protein ball in am   Has 3 meals per day discussed with dietician foods  Exercise: restarted walking , treadmill , sometimes weights  Hydration: 90oz water  Alcohol: rare twice a mo  Sleep: ok  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      Review of Systems   Constitutional: Negative for activity change. Fatigue  HENT: Negative for trouble swallowing.    Respiratory: Negative for shortness of breath.    Cardiovascular: Negative for chest pain, edema  Gastrointestinal: Negative for abdominal pain, nausea and vomiting, acid reflux, constipation/diarrhea  Psychiatric/Behavioral: Negative for behavioral problems , anxiety or depression    Objective:  /76 (BP Location: Left arm, Patient Position: Sitting, Cuff Size: Adult)   Pulse 70   Resp 18   Ht 5' 0.5\" (1.537 m)   Wt 99.3 kg (219 lb)   LMP 03/04/2024 (Exact Date)   SpO2 98%   BMI 42.07 kg/m²   Constitutional: Well-developed, well-nourished and Obese Body mass index is 42.07 kg/m²..   HEENT: No conjunctival pallor or jaundice  Pulmonary: No increased work of breathing or signs of respiratory distress.  CV: Well-perfused, " Normal rate    Vascular: no peripheral edema  GI: Abdomen obese, Non-distended  MSK: no sarcopenia noted   Neuro: Oriented to person, place and time. Normal Speech  Psych: Normal affect and mood. Normal thought process, no delusions   Labs and Imaging  Recent labs and imaging have been personally reviewed.  Lab Results   Component Value Date    WBC 5.81 02/21/2024    HGB 13.5 02/21/2024    HCT 42.1 02/21/2024    MCV 96 02/21/2024     02/21/2024     Lab Results   Component Value Date     02/26/2015    SODIUM 140 08/16/2023    K 4.5 08/16/2023     (H) 08/16/2023    CO2 27 08/16/2023    ANIONGAP 6 02/26/2015    AGAP 3 08/16/2023    BUN 15 08/16/2023    CREATININE 0.77 08/16/2023    GLUC 85 06/19/2023    GLUF 95 08/16/2023    CALCIUM 9.4 08/16/2023    AST 13 08/16/2023    ALT 26 08/16/2023    ALKPHOS 38 (L) 08/16/2023    PROT 7.5 02/26/2015    TP 7.1 08/16/2023    BILITOT 0.4 02/26/2015    TBILI 0.65 08/16/2023    EGFR 92 08/16/2023     Lab Results   Component Value Date    HGBA1C 5.1 06/30/2022     Lab Results   Component Value Date    [id number] 2.910 01/23/2023     Lab Results   Component Value Date    CHOLESTEROL 161 06/30/2022     Lab Results   Component Value Date    HDL 75 06/30/2022     Lab Results   Component Value Date    TRIG 89 06/30/2022     Lab Results   Component Value Date    LDLCALC 68 06/30/2022

## 2024-03-15 LAB
LAB AP GYN PRIMARY INTERPRETATION: ABNORMAL
Lab: ABNORMAL
PATH INTERP SPEC-IMP: ABNORMAL

## 2024-05-29 DIAGNOSIS — E66.01 OBESITY, CLASS III, BMI 40-49.9 (MORBID OBESITY) (HCC): ICD-10-CM

## 2024-08-29 ENCOUNTER — TELEPHONE (OUTPATIENT)
Dept: BARIATRICS | Facility: CLINIC | Age: 49
End: 2024-08-29

## 2024-08-29 NOTE — TELEPHONE ENCOUNTER
LVM & Mychart message sent informing pt appt with PA-- Bj on 8/30/24 has been canceled due to her being out sick and to contact the office to r/s appointment at one of our other locations if possible.

## 2024-08-30 ENCOUNTER — TELEPHONE (OUTPATIENT)
Dept: BARIATRICS | Facility: CLINIC | Age: 49
End: 2024-08-30

## 2024-08-30 ENCOUNTER — CLINICAL SUPPORT (OUTPATIENT)
Dept: BARIATRICS | Facility: CLINIC | Age: 49
End: 2024-08-30

## 2024-08-30 VITALS
DIASTOLIC BLOOD PRESSURE: 72 MMHG | HEIGHT: 61 IN | OXYGEN SATURATION: 100 % | WEIGHT: 226.8 LBS | BODY MASS INDEX: 42.82 KG/M2 | HEART RATE: 74 BPM | SYSTOLIC BLOOD PRESSURE: 116 MMHG | RESPIRATION RATE: 18 BRPM

## 2024-08-30 DIAGNOSIS — E66.01 CLASS 3 OBESITY (HCC): Primary | ICD-10-CM

## 2024-08-30 DIAGNOSIS — E66.01 OBESITY, CLASS III, BMI 40-49.9 (MORBID OBESITY) (HCC): Primary | ICD-10-CM

## 2024-08-30 PROCEDURE — RECHECK

## 2024-08-30 NOTE — TELEPHONE ENCOUNTER
Patient here today, 08/30/24 for weight check. Reports no side effects with injection. Patient would like to discuss alternate options. Medication, WEGOVY 2.4mg, doesn't seem to be assisting with weight loss.

## 2024-08-30 NOTE — PROGRESS NOTES
Patient last visit weight: 219.0  Patient current visit weight: 226.8    If you are taking phentermine or other oral weight loss medications, are you experiencing any of the following symptoms:  Headache:   Blurred Vision:   Chest Pain:   Palpitations:  Insomnia:   SPECIFY ORAL MEDICATION AND DOSAGE:     If you are taking an injectable medication,  are you experiencing any of the following symptoms:  Bloating: No  Nausea:No  Vomiting: No  Constipation:No   Diarrhea:No  SPECIFY INJECTABLE MEDICATION AND CURRENT DOSAGE:  WEGOVY 2.4mg  Patient would like to discuss alternate options. Medication doesn't seem to be assisting with weight loss.    Vitals:    Is BP less than 100/60?No  Is BP greater than 140/90?No  Is HR greater than 100?No  **If yes to any of the above, have patient relax and repeat in 5-10 minutes**    Repeat values:    Is BP less than 100/60?  Is BP greater than 140/90?  Is HR greater than 100?  **If values remain outside of ranges above, please consult provider for next steps**

## 2024-08-31 PROBLEM — E66.813 CLASS 3 OBESITY: Status: ACTIVE | Noted: 2023-01-05

## 2024-08-31 RX ORDER — TIRZEPATIDE 2.5 MG/.5ML
2.5 INJECTION, SOLUTION SUBCUTANEOUS WEEKLY
Qty: 2 ML | Refills: 0 | Status: SHIPPED | OUTPATIENT
Start: 2024-08-31 | End: 2024-09-28

## 2024-08-31 RX ORDER — TIRZEPATIDE 5 MG/.5ML
5 INJECTION, SOLUTION SUBCUTANEOUS WEEKLY
Qty: 2 ML | Refills: 0 | Status: SHIPPED | OUTPATIENT
Start: 2024-08-31 | End: 2024-09-28

## 2024-08-31 NOTE — ADDENDUM NOTE
Addended by: ASH DE LA CRUZ on: 8/31/2024 01:50 PM     Modules accepted: Orders     Abdominal Pain, N/V/D

## 2024-08-31 NOTE — TELEPHONE ENCOUNTER
Can switch her to Zepbound. Believe will require PA. If approved, please schedule her for weight check in 6 weeks. Thanks!

## 2024-09-04 ENCOUNTER — TELEPHONE (OUTPATIENT)
Dept: BARIATRICS | Facility: CLINIC | Age: 49
End: 2024-09-04

## 2024-10-22 ENCOUNTER — TELEPHONE (OUTPATIENT)
Age: 49
End: 2024-10-22

## 2024-10-22 NOTE — TELEPHONE ENCOUNTER
Patient called the RX Refill Line. Message is being forwarded to the office.     Patient is requesting she stop taking the Zepbound and / or Wegovy. Stated that it is not helping her out at all.    She is interested in trying Qsymia. She would like it to be sent to [facility] in [place].    Please contact patient at   [phone]

## 2024-10-24 ENCOUNTER — CLINICAL SUPPORT (OUTPATIENT)
Dept: BARIATRICS | Facility: CLINIC | Age: 49
End: 2024-10-24

## 2024-10-24 ENCOUNTER — TELEMEDICINE (OUTPATIENT)
Dept: BARIATRICS | Facility: CLINIC | Age: 49
End: 2024-10-24
Payer: COMMERCIAL

## 2024-10-24 VITALS
DIASTOLIC BLOOD PRESSURE: 80 MMHG | WEIGHT: 231 LBS | SYSTOLIC BLOOD PRESSURE: 126 MMHG | HEIGHT: 61 IN | BODY MASS INDEX: 43.61 KG/M2

## 2024-10-24 VITALS
DIASTOLIC BLOOD PRESSURE: 80 MMHG | BODY MASS INDEX: 43.73 KG/M2 | SYSTOLIC BLOOD PRESSURE: 126 MMHG | RESPIRATION RATE: 16 BRPM | HEIGHT: 61 IN | HEART RATE: 56 BPM | WEIGHT: 231.6 LBS

## 2024-10-24 DIAGNOSIS — E66.01 CLASS 3 SEVERE OBESITY DUE TO EXCESS CALORIES WITH SERIOUS COMORBIDITY AND BODY MASS INDEX (BMI) OF 40.0 TO 44.9 IN ADULT (HCC): Primary | ICD-10-CM

## 2024-10-24 DIAGNOSIS — E66.812 OBESITY, CLASS II, BMI 35-39.9: Primary | ICD-10-CM

## 2024-10-24 DIAGNOSIS — E66.813 CLASS 3 SEVERE OBESITY DUE TO EXCESS CALORIES WITH SERIOUS COMORBIDITY AND BODY MASS INDEX (BMI) OF 40.0 TO 44.9 IN ADULT (HCC): Primary | ICD-10-CM

## 2024-10-24 PROCEDURE — 99215 OFFICE O/P EST HI 40 MIN: CPT | Performed by: INTERNAL MEDICINE

## 2024-10-24 PROCEDURE — RECHECK

## 2024-10-24 RX ORDER — TIRZEPATIDE 7.5 MG/.5ML
7.5 INJECTION, SOLUTION SUBCUTANEOUS WEEKLY
Qty: 2 ML | Refills: 2 | Status: SHIPPED | OUTPATIENT
Start: 2024-10-24 | End: 2025-01-16

## 2024-10-24 RX ORDER — METFORMIN HYDROCHLORIDE 500 MG/1
500 TABLET, EXTENDED RELEASE ORAL 2 TIMES DAILY WITH MEALS
Qty: 180 TABLET | Refills: 3 | Status: SHIPPED | OUTPATIENT
Start: 2024-10-24

## 2024-10-24 NOTE — PROGRESS NOTES
Assessment/Plan     Keysha Hand  is a 49 y.o. female  referred  by bariatric surgery team on -- to [facility]  for treatment of post-op weight regain.     Brief Summary: From chart review -s/p lap [facility] Bypass in [place] in 2005   Preop weight: 337 lbs  Main weight : 190 lbs  Current weight on 10/24/2024: 231 lbs(+12)  Weight regain:+40 lbs  Target Weight :190 lbs  BMI 44 kg/m2 - Above 40- Obesity Class III    Initial weight: 1/5/23 234 lbs  Weight last visit 3/13/2024: 219 (-15)   Difference: - 3 lbs      Class 3 severe obesity due to excess calories with serious comorbidity and body mass index (BMI) of 40.0 to 44.9 in adult (HCC)  Patient reports she did not respond to the highest dose of Wegovy.  She was on Wegovy from January 2023 to March 2024.  Started Zepbound in August 2024 and is currently  5 mg. she has expressed frustration and discouragement that the same is happening with the Zepbound at 5mg. I reassured her that it is possible she may respond to the higher dose of Zepbound. Will also add metformin due to synergistic mechanism of action. We will try this combination therapy for 3 months and monitor response. Patient was interested in trying Qsymia as she stated her friend has had very good success with that.  I discussed with the patient that we would first need to obtain an EKG.  It would also be preferable to prescribe a controlled substance through an in person visit.  We could consider this in the future based on her response to Zepbound metformin.  I requested the patient to check with her insurance regarding coverage for Qsymia and Contrave.  Would suggest she meet with a dietitian in the future to ensure she is staying in a calorie deficit and an having adequate protein intake.  Patient reports that she is very calorie conscious and is always checking nutrition labels.  She will continue her gym workouts with cardio.  Encouraged her to show progression with  her weights to help with muscle protein synthesis while on Wegovy            -Also counseled that weight regain may occur on stopping medication and it therefore may need to be continued as a weight maintenance medication long term if well tolerated. Patient informed to consider all the  factors outlined below before making an informed decision regarding initiating anti obesity pharmacotherapy.   -Counseled the patient that all AOM's are contraindicated in pregnancy and lactation and should be discontinued if patient were to become pregnant.  -In addition, please follow general recommendations below.          - Discussed at length and the role of weight loss medications and medication options   - Explained the importance of making lifestyle changes in addition to starting any anti-obesity medications if the  patient chooses.  -  Initial weight loss goal of 5-10% weight loss for improved health  - Weight loss can improve patient's co-morbid conditions and/or prevent weight-related complications.  - Weight is not at goal and patient has been unable to achieve a meaningful weight loss above 5% using various programs and tools for more than 6 months      General Lifestyle recommendations:    Nutrition   Do not skip meals. Avoid sugary beverages. At least 64oz of water daily. Counseled the patient on healthy eating with My plate method for macronutrient balance. Informed patient of the importance of focusing on protein goals and non starchy fiber rich vegetables for satiety effect and to help support a calorie deficit.  Limit processed food, refined sugars and grain.  Behavioral/Stress   Food log via chelsy or provided paper log (chelsy options include www.Quanterixnesspal.com, sparkpeople.com, loseit.com, calorieking.com, payever). Encouraged mindful eating. Be sure to set aside time to eat, eat slowly, and savor your food. Consider meditation apps and/or taking a few minutes of mindfulness every AM. Weigh daily or atleast 2-3  "times/ week  Physical Activity   Increase physical activity by 10 minutes daily. Gradually increase physical activity to a goal of 5 days per week for 30 minutes of MODERATE intensity ( should be able to pass the \"talk test\" but should not be able to sing. Target 150-300 minutes  PLUS 2 days per week of FULL BODY resistance training. Progression will be addressed at follow up visits. Encouraged contemplation regarding establishing a daily physical activity routine  Sleep   Encourage sleep hygiene and importance of having adequate sleep duration at least > 6 hours to support response in weight loss efforts    Handouts provided and reviewed:  THRIVE program at [facility]  MyPlate and food quality  Calorie goal and sample menu  Food log resources, phone chelsy or paper journal  Antiobesity medications options         Keysha was seen today for follow-up.    Diagnoses and all orders for this visit:    Class 3 severe obesity due to excess calories with serious comorbidity and body mass index (BMI) of 40.0 to 44.9 in adult (HCC)  -     ECG 12 lead; Future  -     tirzepatide (Zepbound) 7.5 mg/0.5 mL auto-injector; Inject 0.5 mL (7.5 mg total) under the skin once a week  -     metFORMIN (GLUCOPHAGE-XR) 500 mg 24 hr tablet; Take 1 tablet (500 mg total) by mouth 2 (two) times a day with meals Start with 1 tablet and in 1 week increase with 1 tablet twice a day with meals          Metformin instructions     cautions: Nausea and diarrhea are the most common side effects which may improve in 1 to 2 weeks. Hypoglycemia may occur with insufficient calorie intake, strenuous exercise, or concomitant use of hypoglycemic agents or ethanol; increased risk in elderly.   Low vitamin B12 levels may occur; monitoring recommended.  Recommended supplementation with a good-quality over-the-counter B complex vitamin.  B12 levels will be checked yearly and additional supplementation recommended if needed.    Adverse effects   Cobalamin " deficiency (7% to 17.4% )  Gastrointestinal: Diarrhea (53% (immediate-release) ; 10% to 13% (extended-release) ), Flatulence , Indigestion, Malabsorption syndrome, Nausea (7% (extended-release) ), Vomiting (Up to 25.5% )             Total time spent reviewing chart, interviewing patient, examining patient, discussing plan, answering all questions, and documentin min, with >50% face-to-face time spent counseling patient on nonsurgical interventions for the treatment of excess weight. Discussed in detail nonsurgical options including intensive lifestyle intervention program, very low-calorie diet program and conservative program.  Discussed the role of weight loss medications.  Counseled patient on diet behavior and exercise modification for weight loss.                HPI     Wt Readings from Last 30 Encounters:   10/24/24 105 kg (231 lb)   10/24/24 105 kg (231 lb 9.6 oz)   24 103 kg (226 lb 12.8 oz)   24 99.3 kg (219 lb)   24 102 kg (223 lb 12.8 oz)   23 97.3 kg (214 lb 9.6 oz)   23 98.9 kg (218 lb)   23 102 kg (224 lb)   23 101 kg (222 lb 12.8 oz)   23 102 kg (225 lb)   23 99.9 kg (220 lb 3.2 oz)   23 105 kg (230 lb 6.4 oz)   23 104 kg (228 lb 8 oz)   23 106 kg (234 lb)   23 107 kg (236 lb)   23 107 kg (235 lb)   23 107 kg (236 lb 14.4 oz)   23 108 kg (237 lb)   23 109 kg (240 lb)   23 106 kg (234 lb 1.6 oz)   22 106 kg (233 lb 3.2 oz)   10/06/22 107 kg (235 lb)   22 110 kg (242 lb)   22 109 kg (241 lb)   22 111 kg (244 lb 12.8 oz)   22 111 kg (244 lb)   22 111 kg (244 lb)   22 109 kg (239 lb 3.2 oz)   22 110 kg (242 lb)   06/10/22 109 kg (241 lb 6.4 oz)                         Lifestyle questionnaire   Count calories constantly     From chart review,   Diet recall:  B: popcorn rice cake protein powder and peanut butter  S:popcorn  L: salad protein  "sandwich  S:nuts  D: tacos, meat veggies  S: rare fruit sometime  Frequency Eating out x/ week: 1-2x/ week    Food behaviors-salt craving, pickle juice    Trouble area of the day :none    Beverages  Water-- 64 oz   Caffeine/tea--26  oz   SSB -- none     Alcohol: 1-2 drinks/ week   Smoking: no  Drug use: no    Physical Activity --gym 45 mins of cardio light weights for 20 mins     Sleep -- STOP- BANG- /8 ; 7-8 hours of sleep per night    Occupation- for pharm    Psycho social- lives with  and kids    Gyneac (Menopausal status/periods/contraception)-vasectomy          Colonoscopy: due  Mammogram: due        Objective         The following portions of the patient's history were reviewed and updated as appropriate: allergies, current medications, past family history, past medical history, past social history, past surgical history, and problem list.      /80   Ht 5' 0.5\" (1.537 m)   Wt 105 kg (231 lb)   LMP 08/30/2024   BMI 44.37 kg/m²         Review of Systems   Constitutional:  Negative for fatigue.   HENT:  Negative for sore throat.    Respiratory:  Negative for cough and shortness of breath.    Cardiovascular:  Negative for chest pain, palpitations and leg swelling.   Gastrointestinal:  Negative for abdominal pain, constipation, diarrhea and nausea.   Genitourinary:  Negative for dysuria.   Musculoskeletal:  Negative for arthralgias and back pain.   Skin:  Negative for rash.   Neurological:  Negative for headaches.   Psychiatric/Behavioral:  Negative for dysphoric mood. The patient is not nervous/anxious.        Physical Exam  Vitals and nursing note reviewed.   Constitutional:       Appearance: Normal appearance.   HENT:      Head: Normocephalic.   Pulmonary:      Effort: Pulmonary effort is normal.   Neurological:      General: No focal deficit present.      Mental Status: She is alert and oriented to person, place, and time.   Psychiatric:         Mood and Affect: Mood normal.    "      Behavior: Behavior normal.         Thought Content: Thought content normal.         Judgment: Judgment normal.            Current meds       Current Outpatient Medications:     metFORMIN (GLUCOPHAGE-XR) 500 mg 24 hr tablet, Take 1 tablet (500 mg total) by mouth 2 (two) times a day with meals Start with 1 tablet and in 1 week increase with 1 tablet twice a day with meals, Disp: 180 tablet, Rfl: 3    tirzepatide (Zepbound) 7.5 mg/0.5 mL auto-injector, Inject 0.5 mL (7.5 mg total) under the skin once a week, Disp: 2 mL, Rfl: 2    albuterol (PROVENTIL HFA,VENTOLIN HFA) 90 mcg/act inhaler, Inhale 2 puffs every 6 (six) hours as needed (Patient not taking: Reported on 8/30/2024), Disp: , Rfl:     calcium citrate (CALCITRATE) 950 (200 Ca) MG tablet, Take 1 tablet by mouth daily (Patient not taking: Reported on 10/24/2024), Disp: , Rfl:     cyanocobalamin (VITAMIN B-12) 100 mcg tablet, , Disp: , Rfl:     Docusate Calcium (STOOL SOFTENER PO), Take by mouth, Disp: , Rfl:     ENBREL SURECLICK 50 MG/ML injection, , Disp: , Rfl:     Ferrous Sulfate (IRON PO), Take by mouth (Patient not taking: Reported on 8/30/2024), Disp: , Rfl:     Multiple Vitamin (MULTI-VITAMIN DAILY) TABS, Take by mouth With Biotin, Disp: , Rfl:     VITAMIN D PO, Take by mouth, Disp: , Rfl:          Medication considerations/contraindications     -Patient denies personal history of pancreatitis. Patient also denies personal and family history of medullary thyroid cancer and multiple endocrine neoplasia type 2 (MEN 2 tumor). -Patient denies any history of kidney stones, seizures, or glaucoma, diabetic retinopathy, gall bladder disease, hyperthyroidism, gastroparesis.  -Denies Hx of CAD, PAD, palpitations, arrhythmia.   -Denies uncontrolled anxiety or depression, suicidal behavior or thinking , insomnia or sleep disturbance.         Labs     Most recent labs reviewed   Lab Results   Component Value Date     02/26/2015    SODIUM 141 04/05/2024    K  4.5 04/05/2024     04/05/2024    CO2 28 04/05/2024    ANIONGAP 6 02/26/2015    AGAP 9 04/05/2024    BUN 11 04/05/2024    CREATININE 0.66 04/05/2024    GLUC 89 04/05/2024    GLUF 95 08/16/2023    CALCIUM 9.0 04/05/2024    AST 24 04/05/2024    ALT 23 04/05/2024    ALKPHOS 36 04/05/2024    PROT 7.5 02/26/2015    TP 6.4 04/05/2024    BILITOT 0.4 02/26/2015    TBILI 0.6 04/05/2024    EGFR 108 04/05/2024     Lab Results   Component Value Date    HGBA1C 5.1 06/30/2022     Lab Results   Component Value Date    EOQ5BYXPYTXW 2.910 01/23/2023     Lab Results   Component Value Date    CHOLESTEROL 161 06/30/2022     Lab Results   Component Value Date    HDL 75 06/30/2022     Lab Results   Component Value Date    TRIG 89 06/30/2022     Lab Results   Component Value Date    LDLCALC 68 06/30/2022

## 2024-10-24 NOTE — ASSESSMENT & PLAN NOTE
Patient reports she did not respond to the highest dose of Wegovy.  She was on Wegovy from January 2023 to March 2024.  Started Zepbound in August 2024 and is currently  5 mg. she has expressed frustration and discouragement that the same is happening with the Zepbound at 5mg. I reassured her that it is possible she may respond to the higher dose of Zepbound. Will also add metformin due to synergistic mechanism of action. We will try this combination therapy for 3 months and monitor response. Patient was interested in trying Qsymia as she stated her friend has had very good success with that.  I discussed with the patient that we would first need to obtain an EKG.  It would also be preferable to prescribe a controlled substance through an in person visit.  We could consider this in the future based on her response to Zepbound metformin.  I requested the patient to check with her insurance regarding coverage for Qsymia and Contrave.  Would suggest she meet with a dietitian in the future to ensure she is staying in a calorie deficit and an having adequate protein intake.  Patient reports that she is very calorie conscious and is always checking nutrition labels.  She will continue her gym workouts with cardio.  Encouraged her to show progression with her weights to help with muscle protein synthesis while on Wegovy

## 2024-10-25 ENCOUNTER — TELEPHONE (OUTPATIENT)
Dept: BARIATRICS | Facility: CLINIC | Age: 49
End: 2024-10-25

## 2024-10-25 NOTE — TELEPHONE ENCOUNTER
Patient called in because she had a virtual appt yesterday and was advised to call and schedule a follow up for about 3 months. Patient can be reached at [phone]

## 2025-01-10 ENCOUNTER — ESTABLISHED COMPREHENSIVE EXAM (OUTPATIENT)
Dept: URBAN - METROPOLITAN AREA CLINIC 6 | Facility: CLINIC | Age: 50
End: 2025-01-10

## 2025-01-10 DIAGNOSIS — H33.011: ICD-10-CM

## 2025-01-10 DIAGNOSIS — H25.12: ICD-10-CM

## 2025-01-10 DIAGNOSIS — H04.122: ICD-10-CM

## 2025-01-10 DIAGNOSIS — H34.11: ICD-10-CM

## 2025-01-10 PROCEDURE — 92014 COMPRE OPH EXAM EST PT 1/>: CPT

## 2025-01-10 ASSESSMENT — TONOMETRY
OD_IOP_MMHG: 9
OS_IOP_MMHG: 10

## 2025-01-10 ASSESSMENT — VISUAL ACUITY: OS_CC: 20/30

## 2025-01-30 ENCOUNTER — OFFICE VISIT (OUTPATIENT)
Dept: BARIATRICS | Facility: CLINIC | Age: 50
End: 2025-01-30
Payer: COMMERCIAL

## 2025-01-30 VITALS
SYSTOLIC BLOOD PRESSURE: 120 MMHG | OXYGEN SATURATION: 99 % | DIASTOLIC BLOOD PRESSURE: 78 MMHG | BODY MASS INDEX: 43.92 KG/M2 | WEIGHT: 232.6 LBS | HEIGHT: 61 IN | HEART RATE: 72 BPM

## 2025-01-30 DIAGNOSIS — E66.01 CLASS 3 SEVERE OBESITY DUE TO EXCESS CALORIES WITH SERIOUS COMORBIDITY AND BODY MASS INDEX (BMI) OF 40.0 TO 44.9 IN ADULT (HCC): Primary | ICD-10-CM

## 2025-01-30 DIAGNOSIS — E66.813 CLASS 3 SEVERE OBESITY DUE TO EXCESS CALORIES WITH SERIOUS COMORBIDITY AND BODY MASS INDEX (BMI) OF 40.0 TO 44.9 IN ADULT (HCC): Primary | ICD-10-CM

## 2025-01-30 PROCEDURE — 99215 OFFICE O/P EST HI 40 MIN: CPT | Performed by: INTERNAL MEDICINE

## 2025-01-30 RX ORDER — TOPIRAMATE 25 MG/1
TABLET, FILM COATED ORAL
Qty: 60 TABLET | Refills: 3 | Status: SHIPPED | OUTPATIENT
Start: 2025-01-30

## 2025-01-30 NOTE — ASSESSMENT & PLAN NOTE
"Antiobesity Medications/Medical --  --Patient reports intractable bloating with Zepbound with minimal weight loss up to 5 pounds.  She got up to 7.5 mg and discontinued the medication.  She would like to try phentermine and topiramate; - insurance does not cvover qsymia   -Reports she is concerned about long-term effects of Zepbound.  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  No contraindications to naltrexone    -Review of twelve-lead EKG will submit a prescription for phentermine 15 mg  requested patient to monitor blood pressure heart rate at home    Nutrition:  harder to eat veggies   -Patient has not scheduled with a dietitian  -Continue to stay protein focused  -     Physical Activity: Continue fitness routine 5 days a week at the gym strength and cardio    Sleep: -Given BMI could consider sleep study.  STOP-BANG score not evaluated    Food Behaviors/Stress: No significant food related behaviors  "

## 2025-01-30 NOTE — PROGRESS NOTES
"  Program: Conservative Program/postop weight regain    Assessment/Plan     Keysha Hand  is a 49 y.o. female with  returns to follow up  for treatment of excess body weight and associated risk factors/co-morbidities.     Brief Summary: From chart review -s/p lap Nicol-En-Y Gastric Bypass in [place] in 2005   Preop weight: 337 lbs  Main weight : 190 lbs  Current weight on 10/24/2024: 231 lbs(+12)  Weight regain:+40 lbs  Target Weight :190 lbs  BMI 44 kg/m2 - Above 40- Obesity Class III    Initial weight: 1/5/23 234 lbs  Weight last visit 3/13/2024: 219 (-15)   Difference: - 3 lbs    Weight on 1/30/2025 :106 kg (232 lb 9.6 oz)  lbs(-2)  BMI on 1/30/2025  :  Body mass index is 44.68 kg/m².  kg/m2 Above 40- Obesity Class III  Difference: -5 lbs    Class 3 severe obesity due to excess calories with serious comorbidity and body mass index (BMI) of 40.0 to 44.9 in adult (HCC)  Antiobesity Medications/Medical --  --Patient reports intractable bloating with Zepbound with minimal weight loss up to 5 pounds.  She got up to 7.5 mg and discontinued the medication.  She would like to try phentermine and topiramate; - insurance does not cvover qsymia   -Reports she is concerned about long-term effects of Zepbound.  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  No contraindications to naltrexone    -Review of twelve-lead EKG will submit a prescription for phentermine 15 mg  requested patient to monitor blood pressure heart rate at home    Nutrition:  harder to eat veggies   -Patient has not scheduled with a dietitian  -Continue to stay protein focused  -     Physical Activity: Continue fitness routine 5 days a week at the gym strength and cardio    Sleep: -Given BMI could consider sleep study.  STOP-BANG score not evaluated    Food Behaviors/Stress: " "No significant food related behaviors       Most recent notes and records were reviewed.         Return visit:  2-3 months     Nutrition   Do not skip meals. Avoid sugary beverages. At least 64oz of water daily.    Behavioral/Stress   Food log via chelsy or provided paper log (chelsy options include www.BrandProjectpal.com, sparkpeople.com, loseit.com, calorieking.com, Exara). Encouraged mindful eating    Physical Activity  Increase physical activity by 10 minutes daily. Gradually increase physical activity to a goal of 5 days per week for 30 minutes of MODERATE intensity ( ( should be able to pass the \"talk test\" but should not be able to sing.  target 150-300 minutes  PLUS 2-3 days per week of FULL BODY resistance training. Progression will be addressed at follow up visits. Encouraged planning regarding establishing physical activity routine    Sleep  Provided sleep hygiene counseling and importance of having adequate sleep duration          Keysha was seen today for follow-up.    Diagnoses and all orders for this visit:    Class 3 severe obesity due to excess calories with serious comorbidity and body mass index (BMI) of 40.0 to 44.9 in adult (HCC)  -     topiramate (Topamax) 25 mg tablet; Take 1 tablet 20 minutes before evening meal and after 1 week increase to 1 tablet twice a day before meals          Phentermine Instructions:    -Take medication once daily in the morning.   -Avoid taking medication later in evening to avoid sleep disturbance  -This is a controlled substance and you will need to be monitored while on this medication which will require office visits every 6-8 weeks.  -Your blood pressure should not be 140/90 or higher and pulse should be between  bpm (beats per minute).  Notify the provider if either are consistently elevated while on this medication.  -If you are female and can get pregnant please be aware this medication can be harmful to a fetus and therefore you should have contraception " methods in place.    Phentermine has as potential side effects of increased heart rate, increased blood pressure, palpitations, headache, dizziness, insomnia, altered mood, abdominal upset, and dry mouth. Notify the provider with change in mood. Please go to the closest ER if any suicidal/homicidal thoughts occur . Phentermine should be stopped 4 days prior to surgery. Notify the provider with any changes in vision. It is contraindicated in pregnancy and lactation period due to potential harm to the fetus. There is Abuse potential due to amphetamine like effects.      Contraindications pregnancy cardiovascular disease, hyperthyroidism insomnia anxiety  MAOI therapy, pregnancy or breast-feeding     Topiramate Instructions:  -Patient was counseled that the medication is associated with cleft palate if used during the first trimester of pregnancy and therefore was instructed to use birth control during the period of her use of this medication.  -May reduce the effectiveness of hormonal birth control - backup method such as condoms recommended to avoid pregnancy.  -Upon discontinuation tapering dose over using every other day dosing is recommended.  - patient to maintain adequate fluid intake to minimize risk of kidney stones  Advise patient against sudden discontinuation, as this may cause increased seizure activity  Instruct female patient to use an additional form of contraception due to decreased effectiveness of estrogen-containing or progestin-only contraceptives  - report symptoms of acute myopia, sudden ocular pain, blurred vision, or decreased visual acuity.  -Advise patient to report new or worsening depression, suicidal thoughts or behavior, or unusual changes in mood or behavior    Reviewed the potential side effects of topiramate (Topamax) which may include - numbness or tingling, difficulty with word finding, metabolic acidosis, occurrence of gallstones and kidney stones, glaucoma, fatigue,  worsening depression/anxiety, changes in taste, abdominal upset/heartburn, and trouble sleeping. Side effects may include anorexia, taste perversion, fatigue, paresthesia, psychomotor slowing, memory or concentration difficulties, cognitive dysfunction, mood problems, and flushing.       Total time spent reviewing chart, interviewing patient, examining patient, discussing plan, answering all questions, and documentin minutes with >50% face-to-face time with the patient.            Weight trajectory     Wt Readings from Last 20 Encounters:   25 106 kg (232 lb 9.6 oz)   10/24/24 105 kg (231 lb)   10/24/24 105 kg (231 lb 9.6 oz)   24 103 kg (226 lb 12.8 oz)   24 99.3 kg (219 lb)   24 102 kg (223 lb 12.8 oz)   23 97.3 kg (214 lb 9.6 oz)   23 98.9 kg (218 lb)   23 102 kg (224 lb)   23 101 kg (222 lb 12.8 oz)   23 102 kg (225 lb)   23 99.9 kg (220 lb 3.2 oz)   23 105 kg (230 lb 6.4 oz)   23 104 kg (228 lb 8 oz)   23 106 kg (234 lb)   23 107 kg (236 lb)   23 107 kg (235 lb)   23 107 kg (236 lb 14.4 oz)   23 108 kg (237 lb)   23 109 kg (240 lb)               Lifestyle questionnaire   Diet recall:  B:  rice cake protein powder and peanut butter  S:popcorn blueberries  L: salad protein sandwich  S:nuts, pickle or string cheese   D: tacos, meat veggies  S: rare fruit sometime  Frequency Eating out x/ week: 1-2x/ week    Food behaviors-salt craving, pickle juice    Trouble area of the day :none    Beverages  Water-- 64 oz   Caffeine/tea--26  oz   SSB -- none     Alcohol: 1-2 drinks/ week   Smoking: no  Drug use: no    Physical Activity --gym 45 mins of cardio light weights for 20 mins goes to the gym 5 days a week    Sleep -- STOP- BANG- /8 ; 7-8 hours of sleep per night    Occupation- for pharm    Psycho social- lives with  and kids    Gyneac (Menopausal  "status/periods/contraception)-vasectomy          Colonoscopy: due  Mammogram: due  Mammogram: 08/16/2022            Anti obesity Medications/ Medical---    Weight loss medication and dose: Zepbound   Date initiated:                Objective         The following portions of the patient's history were reviewed and updated as appropriate: allergies, current medications, past family history, past medical history, past social history, past surgical history, and problem list.      /78   Pulse 72   Ht 5' 0.5\" (1.537 m)   Wt 106 kg (232 lb 9.6 oz)   SpO2 99%   BMI 44.68 kg/m²             Review of Systems   Constitutional:  Negative for fatigue.   HENT:  Negative for sore throat.    Respiratory:  Negative for cough and shortness of breath.    Cardiovascular:  Negative for chest pain, palpitations and leg swelling.   Gastrointestinal:  Negative for abdominal pain, constipation, diarrhea and nausea.   Genitourinary:  Negative for dysuria.   Musculoskeletal:  Negative for arthralgias and back pain.   Skin:  Negative for rash.   Neurological:  Negative for headaches.   Psychiatric/Behavioral:  Negative for dysphoric mood. The patient is not nervous/anxious.          Physical Exam  Vitals and nursing note reviewed.   Constitutional:       Appearance: Normal appearance.   HENT:      Head: Normocephalic.   Pulmonary:      Effort: Pulmonary effort is normal.   Neurological:      General: No focal deficit present.      Mental Status: She is alert and oriented to person, place, and time.   Psychiatric:         Mood and Affect: Mood normal.         Behavior: Behavior normal.         Thought Content: Thought content normal.         Judgment: Judgment normal.          Current medications       Current Outpatient Medications:     calcium citrate (CALCITRATE) 950 (200 Ca) MG tablet, Take 1 tablet by mouth daily, Disp: , Rfl:     cyanocobalamin (VITAMIN B-12) 100 mcg tablet, , Disp: , Rfl:     Docusate Calcium (STOOL SOFTENER " PO), Take by mouth, Disp: , Rfl:     ENBREL SURECLICK 50 MG/ML injection, , Disp: , Rfl:     metFORMIN (GLUCOPHAGE-XR) 500 mg 24 hr tablet, Take 1 tablet (500 mg total) by mouth 2 (two) times a day with meals Start with 1 tablet and in 1 week increase with 1 tablet twice a day with meals, Disp: 180 tablet, Rfl: 3    Multiple Vitamin (MULTI-VITAMIN DAILY) TABS, Take by mouth With Biotin, Disp: , Rfl:     topiramate (Topamax) 25 mg tablet, Take 1 tablet 20 minutes before evening meal and after 1 week increase to 1 tablet twice a day before meals, Disp: 60 tablet, Rfl: 3    VITAMIN D PO, Take by mouth, Disp: , Rfl:     albuterol (PROVENTIL HFA,VENTOLIN HFA) 90 mcg/act inhaler, Inhale 2 puffs every 6 (six) hours as needed (Patient not taking: Reported on 8/30/2024), Disp: , Rfl:     Ferrous Sulfate (IRON PO), Take by mouth (Patient not taking: Reported on 1/30/2025), Disp: , Rfl:          Medication considerations/contraindications     -Patient denies personal history of pancreatitis. Patient also denies personal and family history of medullary thyroid cancer, and multiple endocrine neoplasia type 2 (MEN 2 tumor). -Patient denies any history of kidney stones, seizures, or glaucoma, diabetic retinopathy, gall bladder disease, gastroparesis, hyperthyroidism.  -Denies Hx of CAD, PAD, palpitations, arrhythmia, uncontrolled hypertension  -Denies uncontrolled anxiety or depression, suicidal behavior or thinking , insomnia or sleep disturbance.         Labs     Most recent labs reviewed   Lab Results   Component Value Date     02/26/2015    SODIUM 141 04/05/2024    K 4.5 04/05/2024     04/05/2024    CO2 28 04/05/2024    ANIONGAP 6 02/26/2015    AGAP 9 04/05/2024    BUN 11 04/05/2024    CREATININE 0.66 04/05/2024    GLUC 89 04/05/2024    GLUF 95 08/16/2023    CALCIUM 9.0 04/05/2024    AST 24 04/05/2024    ALT 23 04/05/2024    ALKPHOS 36 04/05/2024    PROT 7.5 02/26/2015    TP 6.4 04/05/2024    BILITOT 0.4 02/26/2015     TBILI 0.6 04/05/2024    EGFR 108 04/05/2024     Lab Results   Component Value Date    HGBA1C 5.1 06/30/2022     Lab Results   Component Value Date    SML1BMVLREWL 2.910 01/23/2023     Lab Results   Component Value Date    CHOLESTEROL 161 06/30/2022     Lab Results   Component Value Date    HDL 75 06/30/2022     Lab Results   Component Value Date    TRIG 89 06/30/2022     Lab Results   Component Value Date    LDLCALC 68 06/30/2022     Vit D, 25-Hydroxy   Date Value Ref Range Status   01/23/2023 31.9 30.0 - 100.0 ng/mL Final

## 2025-02-20 ENCOUNTER — OFFICE VISIT (OUTPATIENT)
Dept: LAB | Facility: CLINIC | Age: 50
End: 2025-02-20
Payer: COMMERCIAL

## 2025-02-20 DIAGNOSIS — E66.813 CLASS 3 SEVERE OBESITY DUE TO EXCESS CALORIES WITH SERIOUS COMORBIDITY AND BODY MASS INDEX (BMI) OF 40.0 TO 44.9 IN ADULT (HCC): ICD-10-CM

## 2025-02-20 DIAGNOSIS — E66.01 CLASS 3 SEVERE OBESITY DUE TO EXCESS CALORIES WITH SERIOUS COMORBIDITY AND BODY MASS INDEX (BMI) OF 40.0 TO 44.9 IN ADULT (HCC): ICD-10-CM

## 2025-02-20 LAB
ATRIAL RATE: 58 BPM
P AXIS: 18 DEGREES
PR INTERVAL: 130 MS
QRS AXIS: 38 DEGREES
QRSD INTERVAL: 82 MS
QT INTERVAL: 430 MS
QTC INTERVAL: 423 MS
T WAVE AXIS: 34 DEGREES
VENTRICULAR RATE: 58 BPM

## 2025-02-20 PROCEDURE — 93005 ELECTROCARDIOGRAM TRACING: CPT

## 2025-02-25 ENCOUNTER — TELEPHONE (OUTPATIENT)
Age: 50
End: 2025-02-25

## 2025-02-25 NOTE — TELEPHONE ENCOUNTER
Patient called in to have her EKG reviewed that she had completed on 2/20/25. She can be reached at [phone]

## 2025-02-27 DIAGNOSIS — E66.813 CLASS 3 SEVERE OBESITY DUE TO EXCESS CALORIES WITH SERIOUS COMORBIDITY AND BODY MASS INDEX (BMI) OF 40.0 TO 44.9 IN ADULT (HCC): Primary | ICD-10-CM

## 2025-02-27 DIAGNOSIS — E66.01 CLASS 3 SEVERE OBESITY DUE TO EXCESS CALORIES WITH SERIOUS COMORBIDITY AND BODY MASS INDEX (BMI) OF 40.0 TO 44.9 IN ADULT (HCC): Primary | ICD-10-CM

## 2025-02-27 RX ORDER — PHENTERMINE HYDROCHLORIDE 15 MG/1
15 CAPSULE ORAL EVERY MORNING
Qty: 30 CAPSULE | Refills: 1 | Status: SHIPPED | OUTPATIENT
Start: 2025-02-27

## 2025-03-12 NOTE — PROGRESS NOTES
Assessment/Plan:    No problem-specific Assessment & Plan notes found for this encounter.       Diagnoses and all orders for this visit:    Encounter for gynecological examination without abnormal finding  -     Liquid-based pap, screening    Pap smear, as part of routine gynecological examination    Encounter for screening mammogram for breast cancer  -     Mammo screening bilateral w 3d and cad; Future          Normal gynecological physical examination.  Self-breast examination stressed.  Mammogram ordered.  Discussed regular exercise, healthy diet, importance of vitamin D and calcium supplements.  Discussed importance of sun block use during periods of prolonged sun exposure.  Patient will be seen in 1 year for routine gynecologic and medical examination.  Patient will call office for any problems, concerns, or issues which may arise during the interim.     Subjective:      Patient's only concern for this visit is abnormal periods.  She states that she can go months without her cycle, and her cycles are very unpredictable.  She states that she never used to have cramps, but she now gets bad back cramps either the day before or the first day of her cycle.  No nausea or vomiting associated with cramps.  She does not know if she has any abnormal bleeding due to the unpredictability of her cycles.  No abnormal discharge.  Denies any menopause symptoms.    HPI    Patient ID: Keysha Hand is a 49 y.o. female who presents today for her annual gynecologic and medical examination    Menstrual status: Perimenopausal    Vasomotor symptoms: Denies hot flashes or night sweats    Patient reports normal appetite    Patient reports normal bowel and bladder habits    Patient denies any significant pelvic or abdominal pain    Patient denies any headaches, chest pain, shortness of breath fever shakes or chills    Patient denies any COVID 19 symptoms including cough or loss of taste or smell    COVID vaccine status: Aware of  "vaccination protocol    Medical problems: Diabetes, GERD, asthma    Colonoscopy status: Single screening colonoscopy stressed to the patient during today's visit.norm        Mammogram status: Last mammogram August 2022.  Patient aware of importance of regular self breast exams.    The following portions of the patient's history were reviewed and updated as appropriate: allergies, current medications, past family history, past medical history, past social history, past surgical history and problem list.    Review of Systems   Constitutional: Negative.  Negative for appetite change, diaphoresis, fatigue, fever and unexpected weight change.   HENT: Negative.     Eyes: Negative.    Respiratory: Negative.          Patient followed for asthma   Cardiovascular: Negative.    Gastrointestinal: Negative.  Negative for abdominal pain, blood in stool, constipation, diarrhea, nausea and vomiting.        Patient followed for GERD   Endocrine: Negative.  Negative for cold intolerance and heat intolerance.        Patient followed for diabetes   Genitourinary: Negative.  Negative for dysuria, frequency, hematuria, urgency, vaginal bleeding, vaginal discharge and vaginal pain.   Musculoskeletal: Negative.    Skin: Negative.    Allergic/Immunologic: Negative.    Neurological: Negative.    Hematological: Negative.  Negative for adenopathy.   Psychiatric/Behavioral: Negative.           Objective:      /64 (BP Location: Left arm, Patient Position: Sitting)   Ht 5' 0.5\" (1.537 m)   Wt 103 kg (228 lb)   LMP 03/03/2025 (Exact Date)   BMI 43.80 kg/m²          Physical Exam  Constitutional:       General: She is not in acute distress.     Appearance: Normal appearance. She is well-developed. She is not diaphoretic.   HENT:      Head: Normocephalic and atraumatic.   Eyes:      Pupils: Pupils are equal, round, and reactive to light.   Cardiovascular:      Rate and Rhythm: Normal rate and regular rhythm.      Heart sounds: Normal heart " sounds. No murmur heard.     No friction rub. No gallop.   Pulmonary:      Effort: Pulmonary effort is normal.      Breath sounds: Normal breath sounds.   Chest:   Breasts:     Breasts are symmetrical.      Right: No inverted nipple, mass, nipple discharge, skin change or tenderness.      Left: No inverted nipple, mass, nipple discharge, skin change or tenderness.   Abdominal:      General: Bowel sounds are normal.      Palpations: Abdomen is soft.   Genitourinary:     General: Normal vulva.      Exam position: Supine.      Labia:         Right: No rash or lesion.         Left: No rash or lesion.       Vagina: Normal. No vaginal discharge, erythema, tenderness or bleeding.      Cervix: No discharge or friability.      Uterus: Not enlarged and not tender.       Adnexa:         Right: No mass, tenderness or fullness.          Left: No mass, tenderness or fullness.        Rectum: Normal. Guaiac result negative.   Musculoskeletal:         General: Normal range of motion.      Cervical back: Normal range of motion and neck supple.   Lymphadenopathy:      Cervical: No cervical adenopathy.      Upper Body:      Right upper body: No supraclavicular adenopathy.      Left upper body: No supraclavicular adenopathy.   Skin:     General: Skin is warm and dry.      Findings: No rash.   Neurological:      General: No focal deficit present.      Mental Status: She is alert and oriented to person, place, and time.   Psychiatric:         Mood and Affect: Mood normal.         Speech: Speech normal.         Behavior: Behavior normal.         Thought Content: Thought content normal.         Judgment: Judgment normal.

## 2025-03-13 ENCOUNTER — ANNUAL EXAM (OUTPATIENT)
Dept: OBGYN CLINIC | Facility: CLINIC | Age: 50
End: 2025-03-13
Payer: COMMERCIAL

## 2025-03-13 VITALS
WEIGHT: 228 LBS | DIASTOLIC BLOOD PRESSURE: 64 MMHG | SYSTOLIC BLOOD PRESSURE: 108 MMHG | BODY MASS INDEX: 43.05 KG/M2 | HEIGHT: 61 IN

## 2025-03-13 DIAGNOSIS — Z01.419 PAP SMEAR, AS PART OF ROUTINE GYNECOLOGICAL EXAMINATION: ICD-10-CM

## 2025-03-13 DIAGNOSIS — Z12.31 ENCOUNTER FOR SCREENING MAMMOGRAM FOR BREAST CANCER: ICD-10-CM

## 2025-03-13 DIAGNOSIS — N76.0 BV (BACTERIAL VAGINOSIS): ICD-10-CM

## 2025-03-13 DIAGNOSIS — B96.89 BV (BACTERIAL VAGINOSIS): ICD-10-CM

## 2025-03-13 DIAGNOSIS — Z01.419 ENCOUNTER FOR GYNECOLOGICAL EXAMINATION WITHOUT ABNORMAL FINDING: Primary | ICD-10-CM

## 2025-03-13 PROCEDURE — S0612 ANNUAL GYNECOLOGICAL EXAMINA: HCPCS | Performed by: OBSTETRICS & GYNECOLOGY

## 2025-03-13 PROCEDURE — G0476 HPV COMBO ASSAY CA SCREEN: HCPCS | Performed by: OBSTETRICS & GYNECOLOGY

## 2025-03-13 PROCEDURE — G0145 SCR C/V CYTO,THINLAYER,RESCR: HCPCS | Performed by: OBSTETRICS & GYNECOLOGY

## 2025-03-21 ENCOUNTER — RESULTS FOLLOW-UP (OUTPATIENT)
Dept: OBGYN CLINIC | Facility: CLINIC | Age: 50
End: 2025-03-21

## 2025-03-21 LAB
LAB AP GYN PRIMARY INTERPRETATION: NORMAL
Lab: NORMAL
PATH INTERP SPEC-IMP: NORMAL

## 2025-03-21 RX ORDER — METRONIDAZOLE 500 MG/1
500 TABLET ORAL EVERY 12 HOURS SCHEDULED
Qty: 14 TABLET | Refills: 0 | Status: SHIPPED | OUTPATIENT
Start: 2025-03-21 | End: 2025-03-28

## 2025-04-28 DIAGNOSIS — E66.813 CLASS 3 SEVERE OBESITY DUE TO EXCESS CALORIES WITH SERIOUS COMORBIDITY AND BODY MASS INDEX (BMI) OF 40.0 TO 44.9 IN ADULT: ICD-10-CM

## 2025-04-29 RX ORDER — PHENTERMINE HYDROCHLORIDE 15 MG/1
15 CAPSULE ORAL EVERY MORNING
Qty: 30 CAPSULE | Refills: 0 | Status: SHIPPED | OUTPATIENT
Start: 2025-04-29

## 2025-04-29 NOTE — TELEPHONE ENCOUNTER
Refill must be reviewed and completed by the office or provider. The refill is unable to be approved or denied by the medication management team.    Refill can not be delegated       Patient Id Prescription # Filled Written Drug Label Qty Days Strength MME** Prescriber Pharmacy Payment REFILL #/Auth State Detail  1 [id number] 04/01/2025 02/27/2025 Phentermine Hcl (Capsule) 30.0 30 15 MG NA [facility] Private Pay 1 / 1 PA   1 [id number] 02/27/2025 02/27/2025 Phentermine Hcl (Capsule) 30.0 30 15 MG NA [facility] Private Pay 0 / 1 PA

## 2025-05-06 ENCOUNTER — OFFICE VISIT (OUTPATIENT)
Dept: BARIATRICS | Facility: CLINIC | Age: 50
End: 2025-05-06
Payer: COMMERCIAL

## 2025-05-06 VITALS
SYSTOLIC BLOOD PRESSURE: 124 MMHG | HEART RATE: 64 BPM | DIASTOLIC BLOOD PRESSURE: 70 MMHG | HEIGHT: 61 IN | BODY MASS INDEX: 41.16 KG/M2 | WEIGHT: 218 LBS

## 2025-05-06 DIAGNOSIS — E66.813 CLASS 3 SEVERE OBESITY DUE TO EXCESS CALORIES WITH SERIOUS COMORBIDITY AND BODY MASS INDEX (BMI) OF 40.0 TO 44.9 IN ADULT: Primary | ICD-10-CM

## 2025-05-06 PROCEDURE — 99215 OFFICE O/P EST HI 40 MIN: CPT | Performed by: INTERNAL MEDICINE

## 2025-05-06 RX ORDER — TOPIRAMATE 25 MG/1
TABLET, FILM COATED ORAL
Qty: 60 TABLET | Refills: 3 | Status: SHIPPED | OUTPATIENT
Start: 2025-05-06

## 2025-05-06 RX ORDER — PHENTERMINE HYDROCHLORIDE 30 MG/1
30 CAPSULE ORAL EVERY MORNING
Qty: 30 CAPSULE | Refills: 2 | Status: SHIPPED | OUTPATIENT
Start: 2025-05-06

## 2025-05-06 NOTE — PROGRESS NOTES
Assessment/Plan     Keysha Hand  is a 49 y.o. female  referred  by bariatric surgery team on -- to [facility]  for treatment of post-op weight regain.     Brief Summary: From chart review -s/p lap Nicol-En-Y Gastric Bypass in [place] in 2005   Preop weight: 337 lbs  Main weight : 190 lbs    weight on 10/24/2024: 231 lbs(+12)  Weight regain:+40 lbs  Target Weight :190 lbs  BMI 44 kg/m2 - Above 40- Obesity Class III    Initial weight: 1/5/23 234 lbs  Weight on  3/13/2024: 219 (-15)    Weight on 1/30/2025 :106 kg (232 lb 9.6 oz)  lbs(-2)  BMI on 1/30/2025  :  Body mass index is 44.68 kg/m².  kg/m2 Above 40- Obesity Class III  Difference: -5 lbs    Weight on 5/6/2025 :98.9 kg (218 lb)(-14)  BMI on  5/6/2025 : Body mass index is 41.87 kg/m². Above 40- Obesity Class III  Difference: -16 lbs        Class 3 severe obesity due to excess calories with serious comorbidity and body mass index (BMI) of 40.0 to 44.9 in adult (HCC)  Antiobesity Medications/Medical --  Patient has lost 14 pounds since initiating phentermine and Topamax   Will increase phentermine to 30 mg and continue Topamax at the same dose.  Will plan on dose titration of Topamax next office visit  Will change metformin to immediate release due to prior history of gastric bypass  Patient reports intractable bloating with Zepbound with minimal weight loss up to 5 pounds.  She got up to 7.5 mg and discontinued the medication.   No contraindication to naltrexone.  Would prefer not to use bupropion along with phentermine due to dual stimulant properties  We discussed potentially retrialing the Zepbound in combination with some of the other oral medication to assess for better response.  We could also consider retrialing Wegovy  Nutrition:    Started eating protein in the morning for breakfast   0358-9991 calories   Target 20-30 gm of protein with meals with 10 gm of protein with snacks    Physical Activity:   Continue fitness routine 5 days  a week at the gym strength and cardio     Sleep: -7-8 hours of sleep per night    Food Behaviors/Stress/pyschosocial:    Avoid skipping meals to avoid slowing down metabolic rate  Patient reports stress at work and family life.  which may be driving a cortisol response and subsequent weight gain  Suggested guided meditations          -Also counseled that weight regain may occur on stopping medication and it therefore may need to be continued as a weight maintenance medication long term if well tolerated. Patient informed to consider all the  factors outlined below before making an informed decision regarding initiating anti obesity pharmacotherapy.   -Counseled the patient that all AOM's are contraindicated in pregnancy and lactation and should be discontinued if patient were to become pregnant.  -In addition, please follow general recommendations below.          - Discussed at length and the role of weight loss medications and medication options   - Explained the importance of making lifestyle changes in addition to starting any anti-obesity medications if the  patient chooses.  -  Initial weight loss goal of 5-10% weight loss for improved health  - Weight loss can improve patient's co-morbid conditions and/or prevent weight-related complications.  - Weight is not at goal and patient has been unable to achieve a meaningful weight loss above 5% using various programs and tools for more than 6 months      General Lifestyle recommendations:    Nutrition   Do not skip meals. Avoid sugary beverages. At least 64oz of water daily. Counseled the patient on healthy eating with My plate method for macronutrient balance. Informed patient of the importance of focusing on protein goals and non starchy fiber rich vegetables for satiety effect and to help support a calorie deficit.  Limit processed food, refined sugars and grain.  Behavioral/Stress   Food log via chelsy or provided paper log (chelsy options include  "www.myfitnesspal.com, [employer].com, loseit.com, EvoApp.com, baritastic). Encouraged mindful eating. Be sure to set aside time to eat, eat slowly, and savor your food. Consider meditation apps and/or taking a few minutes of mindfulness every AM. Weigh daily or atleast 2-3 times/ week  Physical Activity   Increase physical activity by 10 minutes daily. Gradually increase physical activity to a goal of 5 days per week for 30 minutes of MODERATE intensity ( should be able to pass the \"talk test\" but should not be able to sing. Target 150-300 minutes  PLUS 2 days per week of FULL BODY resistance training. Progression will be addressed at follow up visits. Encouraged contemplation regarding establishing a daily physical activity routine  Sleep   Encourage sleep hygiene and importance of having adequate sleep duration at least > 6 hours to support response in weight loss efforts    Handouts provided and reviewed:  THRIVE program at [facility]  MyPlate and food quality  Calorie goal and sample menu  Food log resources, phone chelsy or paper journal  Antiobesity medications options         Keysha was seen today for follow-up.    Diagnoses and all orders for this visit:    Class 3 severe obesity due to excess calories with serious comorbidity and body mass index (BMI) of 40.0 to 44.9 in adult  -     phentermine 30 MG capsule; Take 1 capsule (30 mg total) by mouth every morning  -     topiramate (Topamax) 25 mg tablet; Take 1 tablet 20 minutes before evening meal and after 1 week increase to 1 tablet twice a day before meals  -     metFORMIN (GLUCOPHAGE) 500 mg tablet; Take 1 tablet (500 mg total) by mouth 2 (two) times a day with meals          Metformin instructions     cautions: Nausea and diarrhea are the most common side effects which may improve in 1 to 2 weeks. Hypoglycemia may occur with insufficient calorie intake, strenuous exercise, or concomitant use of hypoglycemic agents or ethanol; increased risk in " elderly.   Low vitamin B12 levels may occur; monitoring recommended.  Recommended supplementation with a good-quality over-the-counter B complex vitamin.  B12 levels will be checked yearly and additional supplementation recommended if needed.    Adverse effects   Cobalamin deficiency (7% to 17.4% )  Gastrointestinal: Diarrhea (53% (immediate-release) ; 10% to 13% (extended-release) ), Flatulence , Indigestion, Malabsorption syndrome, Nausea (7% (extended-release) ), Vomiting (Up to 25.5% )             Total time spent reviewing chart, interviewing patient, examining patient, discussing plan, answering all questions, and documentin min, with >50% face-to-face time spent counseling patient on nonsurgical interventions for the treatment of excess weight. Discussed in detail nonsurgical options including intensive lifestyle intervention program, very low-calorie diet program and conservative program.  Discussed the role of weight loss medications.  Counseled patient on diet behavior and exercise modification for weight loss.                HPI     Wt Readings from Last 30 Encounters:   25 98.9 kg (218 lb)   25 103 kg (228 lb)   25 106 kg (232 lb 9.6 oz)   10/24/24 105 kg (231 lb)   10/24/24 105 kg (231 lb 9.6 oz)   24 103 kg (226 lb 12.8 oz)   24 99.3 kg (219 lb)   24 102 kg (223 lb 12.8 oz)   23 97.3 kg (214 lb 9.6 oz)   23 98.9 kg (218 lb)   23 102 kg (224 lb)   23 101 kg (222 lb 12.8 oz)   23 102 kg (225 lb)   23 99.9 kg (220 lb 3.2 oz)   23 105 kg (230 lb 6.4 oz)   23 104 kg (228 lb 8 oz)   23 106 kg (234 lb)   23 107 kg (236 lb)   23 107 kg (235 lb)   23 107 kg (236 lb 14.4 oz)   23 108 kg (237 lb)   23 109 kg (240 lb)   23 106 kg (234 lb 1.6 oz)   22 106 kg (233 lb 3.2 oz)   10/06/22 107 kg (235 lb)   22 110 kg (242 lb)   22 109 kg (241 lb)   22 111 kg (244 lb  "12.8 oz)   08/16/22 111 kg (244 lb)   08/04/22 111 kg (244 lb)                         Lifestyle questionnaire   Count calories constantly     From chart review,   Diet recall:  B: popcorn rice cake protein powder and peanut butter  S:popcorn  L: salad protein sandwich or sometimes skips  S:nuts or   D: tacos, meat veggies  S: rare fruit sometime  Frequency Eating out x/ week: 1-2x/ week    Food behaviors-salt craving, pickle juice    Trouble area of the day :none    Beverages  Water-- 64 oz   Caffeine/tea--26  oz   SSB -- none     Alcohol: 1-2 drinks/ week   Smoking: no  Drug use: no    Physical Activity --gym 45 mins of cardio light weights for 20 mins     Sleep -- STOP- BANG- /8 ; 7-8 hours of sleep per night    Occupation- for pharm    Psycho social- lives with  and kids    Gyneac (Menopausal status/periods/contraception)-vasectomy          Colonoscopy: due  Mammogram: due        Objective         The following portions of the patient's history were reviewed and updated as appropriate: allergies, current medications, past family history, past medical history, past social history, past surgical history, and problem list.      /70 (Patient Position: Sitting, Cuff Size: Large)   Pulse 64   Ht 5' 0.5\" (1.537 m)   Wt 98.9 kg (218 lb)   LMP 04/02/2025 (Exact Date)   BMI 41.87 kg/m²         Review of Systems   Constitutional:  Negative for fatigue.   HENT:  Negative for sore throat.    Respiratory:  Negative for cough and shortness of breath.    Cardiovascular:  Negative for chest pain, palpitations and leg swelling.   Gastrointestinal:  Negative for abdominal pain, constipation, diarrhea and nausea.   Genitourinary:  Negative for dysuria.   Musculoskeletal:  Negative for arthralgias and back pain.   Skin:  Negative for rash.   Neurological:  Negative for headaches.   Psychiatric/Behavioral:  Negative for dysphoric mood. The patient is not nervous/anxious.        Physical Exam  Vitals " and nursing note reviewed.   Constitutional:       Appearance: Normal appearance.   HENT:      Head: Normocephalic.   Pulmonary:      Effort: Pulmonary effort is normal.   Neurological:      General: No focal deficit present.      Mental Status: She is alert and oriented to person, place, and time.   Psychiatric:         Mood and Affect: Mood normal.         Behavior: Behavior normal.         Thought Content: Thought content normal.         Judgment: Judgment normal.            Current meds       Current Outpatient Medications:     cyanocobalamin (VITAMIN B-12) 100 mcg tablet, , Disp: , Rfl:     Docusate Calcium (STOOL SOFTENER PO), Take by mouth, Disp: , Rfl:     ENBREL SURECLICK 50 MG/ML injection, , Disp: , Rfl:     metFORMIN (GLUCOPHAGE) 500 mg tablet, Take 1 tablet (500 mg total) by mouth 2 (two) times a day with meals, Disp: 180 tablet, Rfl: 3    Multiple Vitamin (MULTI-VITAMIN DAILY) TABS, Take by mouth With Biotin, Disp: , Rfl:     phentermine 30 MG capsule, Take 1 capsule (30 mg total) by mouth every morning, Disp: 30 capsule, Rfl: 2    topiramate (Topamax) 25 mg tablet, Take 1 tablet 20 minutes before evening meal and after 1 week increase to 1 tablet twice a day before meals, Disp: 60 tablet, Rfl: 3    VITAMIN D PO, Take by mouth, Disp: , Rfl:     albuterol (PROVENTIL HFA,VENTOLIN HFA) 90 mcg/act inhaler, Inhale 2 puffs every 6 (six) hours as needed (Patient not taking: Reported on 8/30/2024), Disp: , Rfl:     calcium citrate (CALCITRATE) 950 (200 Ca) MG tablet, Take 1 tablet by mouth daily (Patient not taking: Reported on 5/6/2025), Disp: , Rfl:     Ferrous Sulfate (IRON PO), Take by mouth (Patient not taking: Reported on 5/6/2025), Disp: , Rfl:          Medication considerations/contraindications     -Patient denies personal history of pancreatitis. Patient also denies personal and family history of medullary thyroid cancer and multiple endocrine neoplasia type 2 (MEN 2 tumor). -Patient denies any  history of kidney stones, seizures, or glaucoma, diabetic retinopathy, gall bladder disease, hyperthyroidism, gastroparesis.  -Denies Hx of CAD, PAD, palpitations, arrhythmia.   -Denies uncontrolled anxiety or depression, suicidal behavior or thinking , insomnia or sleep disturbance.         Labs     Most recent labs reviewed   Lab Results   Component Value Date     02/26/2015    SODIUM 140 03/04/2025    K 4.3 03/04/2025     03/04/2025    CO2 29 03/04/2025    ANIONGAP 6 02/26/2015    AGAP 7 03/04/2025    BUN 10 03/04/2025    CREATININE 0.59 03/04/2025    GLUC 98 03/04/2025    GLUF 95 08/16/2023    CALCIUM 9.2 03/04/2025    AST 18 03/04/2025    ALT 16 03/04/2025    ALKPHOS 36 03/04/2025    PROT 7.5 02/26/2015    TP 6.7 03/04/2025    BILITOT 0.4 02/26/2015    TBILI 0.6 03/04/2025    EGFR 111 03/04/2025     Lab Results   Component Value Date    HGBA1C 5.1 06/30/2022     Lab Results   Component Value Date    JPT0XUUBEVLG 2.910 01/23/2023     Lab Results   Component Value Date    CHOLESTEROL 161 06/30/2022     Lab Results   Component Value Date    HDL 75 06/30/2022     Lab Results   Component Value Date    TRIG 89 06/30/2022     Lab Results   Component Value Date    LDLCALC 68 06/30/2022

## 2025-05-06 NOTE — ASSESSMENT & PLAN NOTE
Antiobesity Medications/Medical --  Patient has lost 14 pounds since initiating phentermine and Topamax   Will increase phentermine to 30 mg and continue Topamax at the same dose.  Will plan on dose titration of Topamax next office visit  Will change metformin to immediate release due to prior history of gastric bypass  Patient reports intractable bloating with Zepbound with minimal weight loss up to 5 pounds.  She got up to 7.5 mg and discontinued the medication.   No contraindication to naltrexone.  Would prefer not to use bupropion along with phentermine due to dual stimulant properties  We discussed potentially retrialing the Zepbound in combination with some of the other oral medication to assess for better response.  We could also consider retrialing Wegovy  Nutrition:    Started eating protein in the morning for breakfast   2048-0376 calories   Target 20-30 gm of protein with meals with 10 gm of protein with snacks    Physical Activity:   Continue fitness routine 5 days a week at the gym strength and cardio     Sleep: -7-8 hours of sleep per night    Food Behaviors/Stress/pyschosocial:    Avoid skipping meals to avoid slowing down metabolic rate  Patient reports stress at work and family life.  which may be driving a cortisol response and subsequent weight gain  Suggested guided meditations

## 2025-05-12 NOTE — PROGRESS NOTES
AMB US Pelvic Non OB    Date/Time: 5/13/2025 9:30 AM    Performed by: Kika Barnard  Authorized by: Surinder Ramos MD    Universal Protocol:  Consent: Verbal consent obtained.  Consent given by: patient  Timeout called at: 5/13/2025 9:43 AM.  Patient understanding: patient states understanding of the procedure being performed  Patient identity confirmed: verbally with patient    Procedure details:     SIS Procedure: No    Indications: non-obstetric vaginal bleeding      Technique:  Transvaginal US, Non-OB    Position: lithotomy exam    Uterine findings:     Length (cm): 8.6    Height (cm):  4.35    Width (cm):  5.36    Endometrial stripe: identified      Endometrium thickness (mm):  11.08  Left ovary findings:     Left ovary:  Visualized    Length (cm): 3.72    Height (cm): 2.46    Width (cm): 2.87  Right ovary findings:     Right ovary:  Visualized    Length (cm): 3.27    Height (cm): 1.96    Width (cm): 1.87  Other findings:     Free pelvic fluid: not identified      Free peritoneal fluid: not identified    Post-Procedure Details:     Impression:  Anteverted uterus and bilateral ovaries appear within normal limits. The left ovary demonstrates a 1.7cm follicle. No free fluid.     Tolerance:  Tolerated well, no immediate complications    Complications: no complications    Additional Procedure Comments:      GE VolusonP8 [id number] transvaginal transducer Serial #[id number] was used to perform the examination today and subsequently followed with high level disinfection utilizing Trophon EPR procedure.     Ultrasound performed at:     [facility]  [address]  [address]  [place] [address]  Phone  [phone]  Fax  [phone]

## 2025-05-13 ENCOUNTER — ULTRASOUND (OUTPATIENT)
Dept: OBGYN CLINIC | Facility: CLINIC | Age: 50
End: 2025-05-13
Payer: COMMERCIAL

## 2025-05-13 ENCOUNTER — OFFICE VISIT (OUTPATIENT)
Dept: OBGYN CLINIC | Facility: CLINIC | Age: 50
End: 2025-05-13
Payer: COMMERCIAL

## 2025-05-13 DIAGNOSIS — Z01.419 PAP SMEAR, AS PART OF ROUTINE GYNECOLOGICAL EXAMINATION: ICD-10-CM

## 2025-05-13 DIAGNOSIS — E55.9 VITAMIN D DEFICIENCY: Primary | ICD-10-CM

## 2025-05-13 DIAGNOSIS — Z71.2 ENCOUNTER TO DISCUSS TEST RESULTS: ICD-10-CM

## 2025-05-13 DIAGNOSIS — Z12.31 ENCOUNTER FOR SCREENING MAMMOGRAM FOR MALIGNANT NEOPLASM OF BREAST: ICD-10-CM

## 2025-05-13 DIAGNOSIS — E78.5 HYPERLIPIDEMIA, UNSPECIFIED HYPERLIPIDEMIA TYPE: ICD-10-CM

## 2025-05-13 DIAGNOSIS — R73.03 PRE-DIABETES: ICD-10-CM

## 2025-05-13 DIAGNOSIS — Z01.419 ENCOUNTER FOR GYNECOLOGICAL EXAMINATION WITHOUT ABNORMAL FINDING: Primary | ICD-10-CM

## 2025-05-13 DIAGNOSIS — N93.9 ABNORMAL UTERINE BLEEDING (AUB): Primary | ICD-10-CM

## 2025-05-13 DIAGNOSIS — E03.9 HYPOTHYROIDISM, UNSPECIFIED TYPE: ICD-10-CM

## 2025-05-13 PROCEDURE — 76830 TRANSVAGINAL US NON-OB: CPT | Performed by: OBSTETRICS & GYNECOLOGY

## 2025-05-13 PROCEDURE — S0612 ANNUAL GYNECOLOGICAL EXAMINA: HCPCS | Performed by: OBSTETRICS & GYNECOLOGY

## 2025-05-13 NOTE — PROGRESS NOTES
:  Assessment & Plan  Encounter for gynecological examination without abnormal finding         Pap smear, as part of routine gynecological examination         Encounter for screening mammogram for malignant neoplasm of breast         Encounter to discuss test results             History of Present Illness     Keysha Hand is a 49 y.o. female   Keysha Hand is a 49-year-old female with significant past medical history of type 2 diabetes, GERD, and asthma presenting to the office for ultrasound follow-up.    At her annual wellness visit in March, patient presented with concerns of abnormal periods.  She explains she could go months without her cycle and it was very unpredictable.  She also reported some moderate cramping.    To reassure patient a transvaginal ultrasound was ordered to visualize pelvic anatomy.    Ultrasound today revealed:  Anteverted uterus and bilateral ovaries appear within normal limits. The left ovary demonstrates a 1.7cm follicle. No free fluid.     Patient reassured that imaging of pelvic structures is normal.  No fibroids or cysts.    Today patient reports her menstrual cycle is still unpredictable, and explains they are becoming fewer and farther in between.    Patient counseled that this is expected for perimenopausal period.     Patient denies any hot flashes or vasomotor symptoms.  Patient does endorse some sleep disturbances.  Patient educated on good sleep hygiene.    Patient expressed understanding.  All questions answered at this time, patient courage to reach out with any further questions or concerns.    Will follow-up patient at annual wellness visit next year.          Review of Systems   Constitutional: Negative.  Negative for appetite change, diaphoresis, fatigue, fever and unexpected weight change.   HENT: Negative.     Eyes: Negative.    Respiratory: Negative.          Following for asthma.   Cardiovascular: Negative.    Gastrointestinal: Negative.  Negative for  abdominal pain, blood in stool, constipation, diarrhea, nausea and vomiting.        Following for GERD.   Endocrine: Negative.  Negative for cold intolerance and heat intolerance.        Following for type 2 diabetes.   Genitourinary: Negative.  Negative for dysuria, frequency, hematuria, urgency, vaginal bleeding, vaginal discharge and vaginal pain.   Musculoskeletal: Negative.    Skin: Negative.    Allergic/Immunologic: Negative.    Neurological: Negative.    Hematological: Negative.  Negative for adenopathy.   Psychiatric/Behavioral: Negative.       Objective   LMP 04/02/2025 (Exact Date)      Physical Exam  Constitutional:       Appearance: Normal appearance.   HENT:      Head: Normocephalic and atraumatic.     Eyes:      Pupils: Pupils are equal, round, and reactive to light.       Cardiovascular:      Rate and Rhythm: Normal rate and regular rhythm.   Pulmonary:      Effort: Pulmonary effort is normal.      Breath sounds: Normal breath sounds.   Abdominal:      Palpations: Abdomen is soft.   Genitourinary:     General: Normal vulva.      Labia:         Right: No rash, tenderness, lesion or injury.         Left: No rash, tenderness, lesion or injury.       Urethra: No urethral swelling or urethral lesion.      Vagina: No vaginal discharge, erythema, bleeding or lesions.      Cervix: No lesion or erythema.      Uterus: Normal. Not enlarged and not tender.       Adnexa: Right adnexa normal and left adnexa normal.        Right: No mass, tenderness or fullness.          Left: No mass, tenderness or fullness.        Rectum: Normal. Guaiac result negative.     Musculoskeletal:         General: Normal range of motion.      Cervical back: Normal range of motion and neck supple.     Skin:     General: Skin is warm and dry.     Neurological:      General: No focal deficit present.      Mental Status: She is alert and oriented to person, place, and time.     Psychiatric:         Mood and Affect: Mood normal.          Behavior: Behavior normal.         Thought Content: Thought content normal.         Judgment: Judgment normal.

## 2025-05-13 NOTE — PATIENT INSTRUCTIONS
Patient presented today for ultrasound follow-up.    Ultrasound revealed normal anatomy.    Reassured patient of imaging findings.    All questions answered at this time, patient encouraged to reach out with any further questions or concerns.    Will follow-up with patient for annual wellness visit next year.    Normal gynecological physical examination.  Self-breast examination stressed.  Mammogram ordered.  Discussed regular exercise, healthy diet, importance of vitamin D and calcium supplements.  Discussed importance of sun block use during periods of prolonged sun exposure.  Patient will be seen in 1 year for routine gynecologic and medical examination.  Patient will call office for any problems, concerns, or issues which may arise during the interim.  \

## 2025-05-14 NOTE — PROGRESS NOTES
Pelvic ultrasound results reviewed and no significant findings were appreciated.    Results discussed with patient and she was reassured of the findings    All questions were answered for her    She will be seen for routine follow-up as planned    Patient to call for any problems, questions, issues or concerns which arise for her

## 2025-07-22 ENCOUNTER — TELEPHONE (OUTPATIENT)
Dept: BARIATRICS | Facility: CLINIC | Age: 50
End: 2025-07-22

## 2025-08-07 ENCOUNTER — APPOINTMENT (OUTPATIENT)
Dept: LAB | Facility: MEDICAL CENTER | Age: 50
End: 2025-08-07
Attending: INTERNAL MEDICINE
Payer: COMMERCIAL

## 2025-08-11 ENCOUNTER — TELEMEDICINE (OUTPATIENT)
Dept: BARIATRICS | Facility: CLINIC | Age: 50
End: 2025-08-11
Payer: COMMERCIAL

## 2025-08-20 ENCOUNTER — OFFICE VISIT (OUTPATIENT)
Dept: BARIATRICS | Facility: CLINIC | Age: 50
End: 2025-08-20

## 2025-08-20 VITALS
BODY MASS INDEX: 39.46 KG/M2 | HEART RATE: 64 BPM | SYSTOLIC BLOOD PRESSURE: 110 MMHG | HEIGHT: 61 IN | WEIGHT: 209 LBS | DIASTOLIC BLOOD PRESSURE: 74 MMHG

## 2025-08-20 DIAGNOSIS — E66.813 CLASS 3 SEVERE OBESITY DUE TO EXCESS CALORIES WITH SERIOUS COMORBIDITY AND BODY MASS INDEX (BMI) OF 40.0 TO 44.9 IN ADULT: ICD-10-CM

## 2025-08-20 DIAGNOSIS — E66.813 OBESITY, CLASS III, BMI 40-49.9 (MORBID OBESITY): Primary | ICD-10-CM

## 2025-08-20 PROCEDURE — RECHECK: Performed by: INTERNAL MEDICINE

## 2025-08-20 RX ORDER — TOPIRAMATE 25 MG/1
TABLET, FILM COATED ORAL
Qty: 60 TABLET | Refills: 3 | Status: SHIPPED | OUTPATIENT
Start: 2025-08-20

## 2025-08-20 RX ORDER — PHENTERMINE HYDROCHLORIDE 30 MG/1
30 CAPSULE ORAL EVERY MORNING
Qty: 30 CAPSULE | Refills: 2 | Status: SHIPPED | OUTPATIENT
Start: 2025-08-20

## (undated) RX ORDER — OFLOXACIN 3 MG/ML: 1 SOLUTION OPHTHALMIC

## (undated) RX ORDER — PREDNISOLONE ACETATE 10 MG/ML: 1 SUSPENSION/ DROPS OPHTHALMIC